# Patient Record
Sex: FEMALE | Race: BLACK OR AFRICAN AMERICAN | Employment: FULL TIME | URBAN - METROPOLITAN AREA
[De-identification: names, ages, dates, MRNs, and addresses within clinical notes are randomized per-mention and may not be internally consistent; named-entity substitution may affect disease eponyms.]

---

## 2017-01-06 ENCOUNTER — CLINICAL SUPPORT (OUTPATIENT)
Dept: FAMILY MEDICINE CLINIC | Age: 47
End: 2017-01-06

## 2017-01-06 VITALS
HEART RATE: 65 BPM | BODY MASS INDEX: 34.46 KG/M2 | HEIGHT: 66 IN | RESPIRATION RATE: 18 BRPM | SYSTOLIC BLOOD PRESSURE: 142 MMHG | DIASTOLIC BLOOD PRESSURE: 105 MMHG | WEIGHT: 214.4 LBS

## 2017-01-06 DIAGNOSIS — E66.9 OBESITY (BMI 30-39.9): Primary | ICD-10-CM

## 2017-01-06 NOTE — PROGRESS NOTES
Reviewed Joe herman. Will plan to have her complete labs at our lab or at [facility] or [facility].

## 2017-01-06 NOTE — PROGRESS NOTES
Progress Note: Weekly Education Class in the [facility] Weight Loss Program   Is there anything that you or the patient needs to let the [facility] Physician know about? Yes, due to the elevated blood pressure today during Triage the patient had informed me that she had a bad experience with the Phlebotomist during her most recent blood draw at the Weight Loss Lab [facility]. The patient showed me pictures of her Hematoma's on both of her arms and explained that the  stuck her arms 3 times in order to draw the 4 vials of blood. During the Butterfly Needle used to draw the blood, the Phlebotomist repeatedly stuck her and once the needle was inside the arm she proceeded to dig around to find a vein. The patient had to tell the Phlebotomist to stop on two of the sticks, due to it hurting too much to wait for the vein to be found. The patient would like to have someone else draw her blood in the future and is seeking out help in this matter. This is the same  that has been taking Blood Draws from this patient since she has been on our program and the episodes of Blood Draw are becoming too painful for her to go back, she states that she does not look forward month to month when it is time to get her blood drawn. The patient then showed me today her arms and both arms still show Hematoma's where the Blood Draw was taken and the sites are still painful for her today to touch.    Over the past week, have you experienced any side-effects? no    Mandi Alvarado is a 52 y.o. female who is enrolled in [facility]    Visit Vitals    BP (!) 142/105 (BP 1 Location: Left arm, BP Patient Position: Sitting)  Comment: very upset/stressed at the moment of triage    Pulse 65    Resp 18    Ht 5' 6\" (1.676 m)    Wt 214 lb 6.4 oz (97.3 kg)    LMP 12/01/2016    BMI 34.61 kg/m2     Weight Metrics 1/6/2017 12/30/2016 12/20/2016 12/16/2016 12/9/2016 12/9/2016 11/18/2016   Weight 214 lb 6.4 oz 214 lb 210 lb 8 oz 231 lb 6.4 oz - 213 lb 206 lb 6.4 oz   Waist Measure Inches 33 34 - 33.8 34 - 32   BMI 34.61 kg/m2 34.54 kg/m2 33.98 kg/m2 37.35 kg/m2 - 34.38 kg/m2 33.31 kg/m2         Have you received any other medical care this week? no  If yes, where and for what? Have you had any change in your medications since your last visit? no  If yes what? Did you have any problems adhering to the program last week? no  If yes, please explain:       Eating Habits Over Last Week:  Did you take in 64 oz of non-caloric fluids? yes     Did you consume your 4 meal replacements each day?  yes       Physical Activity Over the Past Week:    Aerobic exercise: 0 min  Resistance exercise: 0 workouts / week

## 2017-01-11 NOTE — PROGRESS NOTES
Nurse note from patient's weekly VLCD / LCD / Maintenance class was reviewed. Pertinent medical concerns were:  None noted. Vitals 1/6/2017 12/30/2016 12/20/2016 12/16/2016 12/9/2016   Weight 214 lb 6.4 oz 214 lb 210 lb 8 oz 231 lb 6.4 oz 213 lb     Vitals 11/18/2016 11/7/2016 10/28/2016   Weight 206 lb 6.4 oz 202 lb 201 lb 6.4 oz     Pt complaints noted and will discuss at next office meeting  Continue current regimen.

## 2017-01-13 ENCOUNTER — CLINICAL SUPPORT (OUTPATIENT)
Dept: FAMILY MEDICINE CLINIC | Age: 47
End: 2017-01-13

## 2017-01-13 VITALS
DIASTOLIC BLOOD PRESSURE: 88 MMHG | SYSTOLIC BLOOD PRESSURE: 135 MMHG | HEART RATE: 63 BPM | WEIGHT: 215.2 LBS | BODY MASS INDEX: 34.73 KG/M2

## 2017-01-13 DIAGNOSIS — E66.9 OBESITY (BMI 30-39.9): Primary | ICD-10-CM

## 2017-01-18 ENCOUNTER — OFFICE VISIT (OUTPATIENT)
Dept: INTERNAL MEDICINE CLINIC | Age: 47
End: 2017-01-18

## 2017-01-18 VITALS
HEIGHT: 66 IN | TEMPERATURE: 98.7 F | HEART RATE: 61 BPM | OXYGEN SATURATION: 97 % | WEIGHT: 214 LBS | SYSTOLIC BLOOD PRESSURE: 135 MMHG | BODY MASS INDEX: 34.39 KG/M2 | RESPIRATION RATE: 14 BRPM | DIASTOLIC BLOOD PRESSURE: 85 MMHG

## 2017-01-18 DIAGNOSIS — E66.9 OBESITY (BMI 30-39.9): Primary | ICD-10-CM

## 2017-01-18 DIAGNOSIS — I10 ESSENTIAL HYPERTENSION WITH GOAL BLOOD PRESSURE LESS THAN 140/90: ICD-10-CM

## 2017-01-18 DIAGNOSIS — E55.9 VITAMIN D DEFICIENCY: ICD-10-CM

## 2017-01-18 RX ORDER — LOSARTAN POTASSIUM 25 MG/1
25 TABLET ORAL DAILY
COMMUNITY
End: 2018-07-03 | Stop reason: SDUPTHER

## 2017-01-18 RX ORDER — ERGOCALCIFEROL 1.25 MG/1
50000 CAPSULE ORAL
COMMUNITY
End: 2017-03-22 | Stop reason: DRUGHIGH

## 2017-01-18 RX ORDER — METRONIDAZOLE 500 MG/1
TABLET ORAL
Refills: 2 | COMMUNITY
Start: 2016-12-14 | End: 2017-01-18 | Stop reason: ALTCHOICE

## 2017-01-18 NOTE — PROGRESS NOTES
[facility] Progress Note:   F/up Physician Visit    CC Obesity      Radha Lynn is a 52 y.o. female who is here for her  f/up physician visit for the VLCD Program. Abby Jason has completed 20 weeks of the program to date. She has gained 4 lbs in the last month. She admits that she has not really started the VLCD yet. She was eating off the program for the last few weeks due to her birthday, and the holidays, and her best friends birthday. She states she did start back again with 4 meal replacements a day 2 days ago. Hypertension: BP has been elevated recently. She has met with her PCP Dr Maria Luisa Liriano about 2 weeks ago. Lisinopril was changed to losartan due to cough. BP improved today. Will be having labs completed at our office from now on.      Starting weight: 206 lbs  Current weight: 214 lbs  Lowest weight: 191 lbs  Goal weight: 150 lbs     Most recent EK2016 at 206 lbs    Weight Metrics 2016   Weight - 214 lb 215 lb 3.2 oz 214 lb 6.4 oz 214 lb 210 lb 8 oz 231 lb 6.4 oz   Waist Measure Inches 34 - - 33 34 - 33.8   Exercise Mins/week 30 - - - - - -   Body Fat % 39.3 - - - - - -   BMI - 34.54 kg/m2 34.73 kg/m2 34.61 kg/m2 34.54 kg/m2 33.98 kg/m2 37.35 kg/m2         Current Outpatient Prescriptions   Medication Sig Dispense Refill    losartan (COZAAR) 25 mg tablet Take 25 mg by mouth daily.  doxycycline hyclate 50 mg TbEC Take 50 mg by mouth two (2) times a day.  hydrochlorothiazide (HYDRODIURIL) 25 mg tablet Take 25 mg by mouth daily. Indications: HYPERTENSION  2    iron-vitamin C 65 mg iron- 125 mg TbEC Take  mg by mouth daily.  biotin 2,500 mcg Tab Take 2,500 mcg by mouth daily.  MULTIVITAMIN WITH MINERALS (ONE DAILY COMPLETE PO) Take  by mouth daily.  dapsone 7.5 % glwp 7.5 % by Apply Externally route daily.  Apply to the Face           Participation   Did you attend clinic and class last week? yes    Review of Systems  Since your last visit, have you experienced any complications? no  If yes, please list:     No CP, SOB, palpitations, lightheadedness, dizziness, constipation. Positives highlight in BOLD. Are you taking an appetite suppressant? no  If so, is there any Chest Pain, Palpitations or Dizziness? BP Readings from Last 10 Encounters:   01/18/17 135/85   01/13/17 135/88   01/06/17 (!) 142/105   12/30/16 (!) 137/91   12/20/16 (!) 140/98   12/16/16 (!) 144/93   12/09/16 (!) 141/101   11/18/16 (!) 148/95   11/07/16 134/90   10/28/16 (!) 132/92         Have you received any other medical care this week? no  If yes, where and for what? Have you discontinued or changed any medicine or dose of your medicine since your last visit with Dr Sinai Sotomayor? yes  If yes, where and for what? PCP Dr Lian Juárez changed the patients medication to Losartan Potassium 25 mg once a day to replace Lisinopril medication, as well as Vitamin D 37586CD once a week for the next 8 weeks        Diet  How many ounces of calorie-free liquids did you consume each day? 64 oz    How many meal replacements did you take each day? 4    Did you have any problems adhering to the program?  yes If yes, please explain:       Exercise  Aerobic exercise: 30 min  Resistance exercise: 0 workouts / week  Any discomfort?  no     If yes, where? Review of Systems  Complete ROS negative except where noted above    Objective  Visit Vitals    /85 (BP 1 Location: Left arm, BP Patient Position: Sitting)    Pulse 61    Temp 98.7 °F (37.1 °C) (Oral)    Resp 14    Ht 5' 6\" (1.676 m)    Wt 214 lb (97.1 kg)    LMP 01/01/2016    SpO2 97%    BMI 34.54 kg/m2     Patient's last menstrual period was 01/01/2016.     Waist Circumference: I personally reviewed patient's Weight Management Doc Flowsheet  Neck Circumference: I personally reviewed patient's Weight Management Doc Flowsheet  Percent Body Fat: I personally reviewed patient's Weight Management Doc Flowsheet    Physical Exam  Appearance: Well appearing, obese, A&O, NAD  HEENT:  NC/AT, PERRL, No scleral icterus  Heart:  RRR without M/R/G  Lungs:  CTAB, no rhonchi, rales, or wheezes with good air exchange   Ext:  No LE Edema    Assessment / Plan    Encounter Diagnoses   Name Primary?  Obesity (BMI 30-39. 9) Yes    Essential hypertension with goal blood pressure less than 140/90     Vitamin D deficiency        1. Weight management poorly controlled, needs improvement, patient poorly compliant   Progress was reviewed with patient    2. Labs    Latest results reviewed with patient   Lab slip given to pt for f/up HDL labs    3. HTN: improved today, continues to follow with PCP for this. 4. Vit D: now on weekly ergocalciferol for this. Patient Instructions       Follow up with your PCP for elevated BP. Goals      Weight loss goal for 4 weeks: 10 Lbs. You can do it!!! Body Mass Index: Care Instructions  Your Care Instructions    Body mass index (BMI) can help you see if your weight is raising your risk for health problems. It uses a formula to compare how much you weigh with how tall you are. A BMI between 18.5 and 24.9 is considered healthy. A BMI between 25 and 29.9 is considered overweight. A BMI of 30 or higher is considered obese. If your BMI is in the normal range, it means that you have a lower risk for weight-related health problems. If your BMI is in the overweight or obese range, you may be at increased risk for weight-related health problems, such as high blood pressure, heart disease, stroke, arthritis or joint pain, and diabetes. BMI is just one measure of your risk for weight-related health problems. You may be at higher risk for health problems if you are not active, you eat an unhealthy diet, or you drink too much alcohol or use tobacco products. Follow-up care is a key part of your treatment and safety.  Be sure to make and go to all appointments, and call your doctor if you are having problems. It's also a good idea to know your test results and keep a list of the medicines you take. How can you care for yourself at home? · Practice healthy eating habits. This includes eating plenty of fruits, vegetables, whole grains, lean protein, and low-fat dairy. · Get at least 30 minutes of exercise 5 days a week or more. Brisk walking is a good choice. You also may want to do other activities, such as running, swimming, cycling, or playing tennis or team sports. · Do not smoke. Smoking can increase your risk for health problems. If you need help quitting, talk to your doctor about stop-smoking programs and medicines. These can increase your chances of quitting for good. · Limit alcohol to 2 drinks a day for men and 1 drink a day for women. Too much alcohol can cause health problems. If you have a BMI higher than 25  · Your doctor may do other tests to check your risk for weight-related health problems. This may include measuring the distance around your waist. A waist measurement of more than 40 inches in men or 35 inches in women can increase the risk of weight-related health problems. · Talk with your doctor about steps you can take to stay healthy or improve your health. You may need to make lifestyle changes to lose weight and stay healthy, such as changing your diet and getting regular exercise. Where can you learn more? Go to http://kathy-shamar.info/. Enter S176 in the search box to learn more about \"Body Mass Index: Care Instructions. \"  Current as of: February 16, 2016  Content Version: 11.1  © 2221-8750 Healthwise, Incorporated. Care instructions adapted under license by [employer] (which disclaims liability or warranty for this information).  If you have questions about a medical condition or this instruction, always ask your healthcare professional. [address] any warranty or liability for your use of this information. Follow-up Disposition:  Return in about 4 weeks (around 2/15/2017), or if symptoms worsen or fail to improve. 10 minutes of the 15 minutes face to face time with Antonette Niño consisted of counseling & coordinating and/or discussing treatment plans in reference to her obesity. The primary encounter diagnosis was Obesity (BMI 30-39.9). Diagnoses of Essential hypertension with goal blood pressure less than 140/90 and Vitamin D deficiency were also pertinent to this visit. The patient is to follow up as scheduled and will report to the ED or the office if symptoms change or increase. The patient has voiced understanding and will comply.

## 2017-01-18 NOTE — PATIENT INSTRUCTIONS
Start ergocalciferol once weekly for low vit d level. Follow up with your PCP for elevated BP. Goals      Weight loss goal for 4 weeks:     Lbs. You can do it!!! Body Mass Index: Care Instructions  Your Care Instructions    Body mass index (BMI) can help you see if your weight is raising your risk for health problems. It uses a formula to compare how much you weigh with how tall you are. A BMI between 18.5 and 24.9 is considered healthy. A BMI between 25 and 29.9 is considered overweight. A BMI of 30 or higher is considered obese. If your BMI is in the normal range, it means that you have a lower risk for weight-related health problems. If your BMI is in the overweight or obese range, you may be at increased risk for weight-related health problems, such as high blood pressure, heart disease, stroke, arthritis or joint pain, and diabetes. BMI is just one measure of your risk for weight-related health problems. You may be at higher risk for health problems if you are not active, you eat an unhealthy diet, or you drink too much alcohol or use tobacco products. Follow-up care is a key part of your treatment and safety. Be sure to make and go to all appointments, and call your doctor if you are having problems. It's also a good idea to know your test results and keep a list of the medicines you take. How can you care for yourself at home? · Practice healthy eating habits. This includes eating plenty of fruits, vegetables, whole grains, lean protein, and low-fat dairy. · Get at least 30 minutes of exercise 5 days a week or more. Brisk walking is a good choice. You also may want to do other activities, such as running, swimming, cycling, or playing tennis or team sports. · Do not smoke. Smoking can increase your risk for health problems. If you need help quitting, talk to your doctor about stop-smoking programs and medicines. These can increase your chances of quitting for good.   · Limit alcohol to 2 drinks a day for men and 1 drink a day for women. Too much alcohol can cause health problems. If you have a BMI higher than 25  · Your doctor may do other tests to check your risk for weight-related health problems. This may include measuring the distance around your waist. A waist measurement of more than 40 inches in men or 35 inches in women can increase the risk of weight-related health problems. · Talk with your doctor about steps you can take to stay healthy or improve your health. You may need to make lifestyle changes to lose weight and stay healthy, such as changing your diet and getting regular exercise. Where can you learn more? Go to http://kathy-shamar.info/. Enter S176 in the search box to learn more about \"Body Mass Index: Care Instructions. \"  Current as of: February 16, 2016  Content Version: 11.1  © 7405-9889 [employer]. Care instructions adapted under license by [employer] (which disclaims liability or warranty for this information). If you have questions about a medical condition or this instruction, always ask your healthcare professional. [address] any warranty or liability for your use of this information.

## 2017-01-18 NOTE — MR AVS SNAPSHOT
Visit Information Date & Time Provider Department Dept. Phone Encounter #  
 1/18/2017 10:00 AM Jah Peters NP Internist of [address] [place] [address] Place [id number] Follow-up Instructions Return in about 4 weeks (around 2/15/2017), or if symptoms worsen or fail to improve. Your Appointments 1/20/2017  5:40 AM  
METABOLIC PROGRAM 5 with HRMP WEEKLY CLASS TRICITIES  
HR Metabolic Program ([address] [place] [address]) Appt Note: [facility] Weekly Weigh-in & BP check [place] [facility] [address] [facility]  
  
    
 1/27/2017  7:53 AM  
METABOLIC PROGRAM 5 with HRMP WEEKLY CLASS TRICITIES  
HR Metabolic Program ([address] [place] [address]) Appt Note: [facility] Weekly Weigh-in & BP check [place] [address] [facility]  
[phone]  
  
    
 2/3/2017  6:12 AM  
METABOLIC PROGRAM 5 with HRMP WEEKLY CLASS TRICITIES  
HR Metabolic Program ([address] [place] [address]) Appt Note: weekly class [place] [address] [facility]  
[phone]  
  
    
 2/10/2017  6:49 AM  
METABOLIC PROGRAM 5 with HRMP WEEKLY CLASS TRICITIES  
HR Metabolic Program ([facility]) Appt Note: weekly class [place] [address] [facility]  
[phone] 2/17/2017  2:25 AM  
METABOLIC PROGRAM 5 with HRMP WEEKLY CLASS TRICITIES  
HR Metabolic Program ([address] [place] [address]) Appt Note: weekly class [place] [address] [facility] [address]  
[phone]  
  
    
 2/22/2017 28:01 AM  
METABOLIC PROGRAM 15 with Jah Peters NP Internist of [facility] ([address] [place] [address]) Appt Note: 1 mth f/u  
 [address] [facility], [address] [place]  
  
   
 [address] [facility], [address]  
  
    
 2/24/2017  0:81 AM  
METABOLIC PROGRAM 5 with HRMP WEEKLY CLASS TRICITIES  
HR Metabolic Program ([address] [place] [address]) Appt Note: weekly class [place] [address] [facility]  
[phone] Upcoming Health Maintenance Date Due DTaP/Tdap/Td series (1 - Tdap) 1/5/1991 INFLUENZA AGE 9 TO ADULT 8/1/2016 PAP AKA CERVICAL CYTOLOGY 12/6/2019 Allergies as of 1/18/2017  Review Complete On: 1/18/2017 By: Bia Hernandez NP Severity Noted Reaction Type Reactions Percocet [Oxycodone-acetaminophen]  04/07/2015    Itching Current Immunizations  Never Reviewed No immunizations on file. Not reviewed this visit You Were Diagnosed With   
  
 Codes Comments Obesity (BMI 30-39.9)    -  Primary ICD-10-CM: Q02.7 ICD-9-CM: 278.00 Essential hypertension with goal blood pressure less than 140/90     ICD-10-CM: I10 
ICD-9-CM: 401.9 Vitamin D deficiency     ICD-10-CM: E55.9 ICD-9-CM: 268.9 Vitals BP Pulse Temp Resp Height(growth percentile) Weight(growth percentile) 135/85 (BP 1 Location: Left arm, BP Patient Position: Sitting) 61 98.7 °F (37.1 °C) (Oral) 14 5' 6\" (1.676 m) 214 lb (97.1 kg) LMP SpO2 BMI OB Status Smoking Status 01/01/2016 97% 34.54 kg/m2 Having regular periods Former Smoker Vitals History BMI and BSA Data Body Mass Index Body Surface Area 34.54 kg/m 2 2.13 m 2 Your Updated Medication List  
  
   
This list is accurate as of: 1/18/17 10:45 AM.  Always use your most recent med list.  
  
  
  
  
 biotin 2,500 mcg Tab Take 2,500 mcg by mouth daily. dapsone 7.5 % Glwp  
7.5 % by Apply Externally route daily. Apply to the Face  
  
 doxycycline hyclate 50 mg Tbec Take 50 mg by mouth two (2) times a day. hydroCHLOROthiazide 25 mg tablet Commonly known as:  HYDRODIURIL Take 25 mg by mouth daily. Indications: HYPERTENSION  
  
 iron-vitamin C 65 mg iron- 125 mg Tbec Take  mg by mouth daily. losartan 25 mg tablet Commonly known as:  COZAAR Take 25 mg by mouth daily.   
  
 ONE DAILY COMPLETE PO  
 Take  by mouth daily. VITAMIN D2 50,000 unit capsule Generic drug:  ergocalciferol Take 50,000 Units by mouth every seven (7) days. Follow-up Instructions Return in about 4 weeks (around 2/15/2017), or if symptoms worsen or fail to improve. To-Do List   
 02/18/2017 Lab:  HEMOGLOBIN A1C WITH EAG   
  
 02/18/2017 Lab:  INSULIN   
  
 02/18/2017 Lab:  METABOLIC PANEL, COMPREHENSIVE   
  
 02/18/2017 Lab:  TSH 3RD GENERATION   
  
 02/18/2017 Lab:  URIC ACID   
  
 02/18/2017 Lab:  URINALYSIS W/ RFLX MICROSCOPIC   
  
 02/18/2017 Lab:  VITAMIN D, 25 HYDROXY Patient Instructions Start ergocalciferol once weekly for low vit d level. Follow up with your PCP for elevated BP. Goals Weight loss goal for 4 weeks:     Lbs. You can do it!!! Body Mass Index: Care Instructions Your Care Instructions Body mass index (BMI) can help you see if your weight is raising your risk for health problems. It uses a formula to compare how much you weigh with how tall you are. A BMI between 18.5 and 24.9 is considered healthy. A BMI between 25 and 29.9 is considered overweight. A BMI of 30 or higher is considered obese. If your BMI is in the normal range, it means that you have a lower risk for weight-related health problems. If your BMI is in the overweight or obese range, you may be at increased risk for weight-related health problems, such as high blood pressure, heart disease, stroke, arthritis or joint pain, and diabetes. BMI is just one measure of your risk for weight-related health problems. You may be at higher risk for health problems if you are not active, you eat an unhealthy diet, or you drink too much alcohol or use tobacco products. Follow-up care is a key part of your treatment and safety.  Be sure to make and go to all appointments, and call your doctor if you are having problems. It's also a good idea to know your test results and keep a list of the medicines you take. How can you care for yourself at home? · Practice healthy eating habits. This includes eating plenty of fruits, vegetables, whole grains, lean protein, and low-fat dairy. · Get at least 30 minutes of exercise 5 days a week or more. Brisk walking is a good choice. You also may want to do other activities, such as running, swimming, cycling, or playing tennis or team sports. · Do not smoke. Smoking can increase your risk for health problems. If you need help quitting, talk to your doctor about stop-smoking programs and medicines. These can increase your chances of quitting for good. · Limit alcohol to 2 drinks a day for men and 1 drink a day for women. Too much alcohol can cause health problems. If you have a BMI higher than 25 · Your doctor may do other tests to check your risk for weight-related health problems. This may include measuring the distance around your waist. A waist measurement of more than 40 inches in men or 35 inches in women can increase the risk of weight-related health problems. · Talk with your doctor about steps you can take to stay healthy or improve your health. You may need to make lifestyle changes to lose weight and stay healthy, such as changing your diet and getting regular exercise. Where can you learn more? Go to http://kathy-shamar.info/. Enter S176 in the search box to learn more about \"Body Mass Index: Care Instructions. \" Current as of: February 16, 2016 Content Version: 11.1 © 4472-7336 [employer], Incorporated. Care instructions adapted under license by [employer] (which disclaims liability or warranty for this information). If you have questions about a medical condition or this instruction, always ask your healthcare professional. [address] any warranty or liability for your use of this information. Please provide this summary of care documentation to your next provider. Your primary care clinician is listed as Jessy Payne. If you have any questions after today's visit, please call [phone].

## 2017-01-18 NOTE — PROGRESS NOTES
Chief Complaint   Patient presents with    Weight Management     1. Have you been to the ER, urgent care clinic since your last visit? Hospitalized since your last visit? No    2. Have you seen or consulted any other health care providers outside of the [facility] since your last visit? Include any pap smears or colon screening.  No

## 2017-01-27 ENCOUNTER — CLINICAL SUPPORT (OUTPATIENT)
Dept: FAMILY MEDICINE CLINIC | Age: 47
End: 2017-01-27

## 2017-01-27 VITALS
WEIGHT: 204.2 LBS | DIASTOLIC BLOOD PRESSURE: 85 MMHG | SYSTOLIC BLOOD PRESSURE: 127 MMHG | HEIGHT: 66 IN | BODY MASS INDEX: 32.82 KG/M2 | HEART RATE: 76 BPM

## 2017-01-27 DIAGNOSIS — E66.9 OBESITY (BMI 30-39.9): Primary | ICD-10-CM

## 2017-01-27 NOTE — PROGRESS NOTES
Progress Note: Weekly Education Class in the [facility] Weight Loss Program   Is there anything that you or the patient needs to let the [address] [facility] Physician know about? no  Over the past week, have you experienced any side-effects? no    Stormy Koroma is a 52 y.o. female who is enrolled in [facility] Loss Program    Visit Vitals    /85 (BP 1 Location: Left arm, BP Patient Position: Sitting)    Pulse 76    Ht 5' 6\" (1.676 m)    Wt 204 lb 3.2 oz (92.6 kg)    LMP 01/01/2016    BMI 32.96 kg/m2     Weight Metrics 1/27/2017 1/18/2017 1/18/2017 1/13/2017 1/6/2017 12/30/2016 12/20/2016   Weight 204 lb 3.2 oz - 214 lb 215 lb 3.2 oz 214 lb 6.4 oz 214 lb 210 lb 8 oz   Waist Measure Inches 33 34 - - 33 34 -   Exercise Mins/week - 30 - - - - -   Body Fat % - 39.3 - - - - -   BMI 32.96 kg/m2 - 34.54 kg/m2 34.73 kg/m2 34.61 kg/m2 34.54 kg/m2 33.98 kg/m2         Have you received any other medical care this week? no  If yes, where and for what? Have you had any change in your medications since your last visit? no  If yes what? Did you have any problems adhering to the program last week? no  If yes, please explain:       Eating Habits Over Last Week:  Did you take in 64 oz of non-caloric fluids?  yes     Did you consume your 4 meal replacements each day? no on two days only got in 3 meal replacements      Physical Activity Over the Past Week:    Aerobic exercise: 120 min  Resistance exercise: 7 hours and 30 minute workouts / week

## 2017-01-30 NOTE — PROGRESS NOTES
Nurse note from patient's weekly VLCD / LCD / Maintenance class was reviewed. Pertinent medical concerns were:  None noted. Vitals 1/27/2017 1/18/2017 1/18/2017 1/13/2017 1/6/2017   Weight 204 lb 3.2 oz  214 lb 215 lb 3.2 oz 214 lb 6.4 oz     Vitals 12/30/2016 12/20/2016 12/16/2016   Weight 214 lb 210 lb 8 oz 231 lb 6.4 oz     Continue current regimen.

## 2017-02-03 ENCOUNTER — CLINICAL SUPPORT (OUTPATIENT)
Dept: FAMILY MEDICINE CLINIC | Age: 47
End: 2017-02-03

## 2017-02-03 VITALS
SYSTOLIC BLOOD PRESSURE: 119 MMHG | BODY MASS INDEX: 32.3 KG/M2 | HEIGHT: 66 IN | DIASTOLIC BLOOD PRESSURE: 82 MMHG | HEART RATE: 72 BPM | WEIGHT: 201 LBS

## 2017-02-03 DIAGNOSIS — E66.9 OBESITY (BMI 30-39.9): Primary | ICD-10-CM

## 2017-02-03 NOTE — PROGRESS NOTES
Progress Note: Weekly Education Class in the [facility] Loss Program   Is there anything that you or the patient needs to let the [address] [facility] Physician know about? no  Over the past week, have you experienced any side-effects? no    Chrissy Perez is a 52 y.o. female who is enrolled in [facility] Loss Program    Visit Vitals    /82 (BP 1 Location: Left arm, BP Patient Position: Sitting)    Pulse 72    Ht 5' 6\" (1.676 m)    Wt 201 lb (91.2 kg)    LMP 01/01/2016    BMI 32.44 kg/m2     Weight Metrics 2/3/2017 1/27/2017 1/18/2017 1/18/2017 1/13/2017 1/6/2017 12/30/2016   Weight 201 lb 204 lb 3.2 oz - 214 lb 215 lb 3.2 oz 214 lb 6.4 oz 214 lb   Waist Measure Inches 33 33 34 - - 33 34   Exercise Mins/week - - 30 - - - -   Body Fat % - - 39.3 - - - -   BMI 32.44 kg/m2 32.96 kg/m2 - 34.54 kg/m2 34.73 kg/m2 34.61 kg/m2 34.54 kg/m2         Have you received any other medical care this week? no  If yes, where and for what? Have you had any change in your medications since your last visit? no  If yes what? Did you have any problems adhering to the program last week? no  If yes, please explain:       Eating Habits Over Last Week:  Did you take in 64 oz of non-caloric fluids? yes     Did you consume your 4 meal replacements each day?  yes       Physical Activity Over the Past Week:    Aerobic exercise: 120 min  Resistance exercise: 150 min workouts / week

## 2017-02-05 NOTE — PROGRESS NOTES
Nurse note from patient's weekly VLCD / LCD / Maintenance class was reviewed. Pertinent medical concerns were:  None noted. Vitals 2/3/2017 1/27/2017 1/18/2017 1/18/2017 1/13/2017   Weight 201 lb 204 lb 3.2 oz  214 lb 215 lb 3.2 oz     Vitals 1/6/2017 12/30/2016 12/20/2016 12/16/2016   Weight 214 lb 6.4 oz 214 lb 210 lb 8 oz 231 lb 6.4 oz     Continue current regimen.

## 2017-02-10 ENCOUNTER — CLINICAL SUPPORT (OUTPATIENT)
Dept: FAMILY MEDICINE CLINIC | Age: 47
End: 2017-02-10

## 2017-02-10 VITALS
DIASTOLIC BLOOD PRESSURE: 85 MMHG | SYSTOLIC BLOOD PRESSURE: 139 MMHG | BODY MASS INDEX: 32.08 KG/M2 | HEIGHT: 66 IN | WEIGHT: 199.6 LBS | HEART RATE: 67 BPM

## 2017-02-10 DIAGNOSIS — E66.9 OBESITY (BMI 30-39.9): Primary | ICD-10-CM

## 2017-02-10 NOTE — PROGRESS NOTES
Progress Note: Weekly Education Class in the [facility] Weight Loss Program   Is there anything that you or the patient needs to let the [address] [facility] Physician know about? no  Over the past week, have you experienced any side-effects? no    Tess Woodson is a 52 y.o. female who is enrolled in [facility] Loss Program    Visit Vitals    /85 (BP 1 Location: Right arm, BP Patient Position: Sitting)    Pulse 67    Ht 5' 6\" (1.676 m)    Wt 199 lb 9.6 oz (90.5 kg)    LMP 01/01/2016    BMI 32.22 kg/m2     Weight Metrics 2/10/2017 2/3/2017 1/27/2017 1/18/2017 1/18/2017 1/13/2017 1/6/2017   Weight 199 lb 9.6 oz 201 lb 204 lb 3.2 oz - 214 lb 215 lb 3.2 oz 214 lb 6.4 oz   Waist Measure Inches 32 33 33 34 - - 33   Exercise Mins/week - - - 30 - - -   Body Fat % - - - 39.3 - - -   BMI 32.22 kg/m2 32.44 kg/m2 32.96 kg/m2 - 34.54 kg/m2 34.73 kg/m2 34.61 kg/m2         Have you received any other medical care this week? no  If yes, where and for what? Have you had any change in your medications since your last visit? no  If yes what? Did you have any problems adhering to the program last week? no  If yes, please explain:       Eating Habits Over Last Week:  Did you take in 64 oz of non-caloric fluids? No patient is getting between 32-81 oz per day    Did you consume your 4 meal replacements each day?  No just missed 1 meal replacement this week       Physical Activity Over the Past Week:    Aerobic exercise: 90 min  Resistance exercise: 4 hours and 30 min workouts / week

## 2017-02-14 NOTE — PROGRESS NOTES
Nurse note from patient's weekly VLCD / LCD / Maintenance class was reviewed. Pertinent medical concerns were:  None noted. Vitals 2/10/2017 2/3/2017 1/27/2017 1/18/2017 1/18/2017   Weight 199 lb 9.6 oz 201 lb 204 lb 3.2 oz  214 lb     Vitals 1/13/2017 1/6/2017 12/30/2016 12/20/2016   Weight 215 lb 3.2 oz 214 lb 6.4 oz 214 lb 210 lb 8 oz     Continue current regimen.

## 2017-02-16 ENCOUNTER — HOSPITAL ENCOUNTER (OUTPATIENT)
Dept: LAB | Age: 47
Discharge: HOME | End: 2017-02-16
Payer: COMMERCIAL

## 2017-02-16 LAB
ALBUMIN SERPL BCP-MCNC: 4.1 G/DL (ref 3.4–5)
ALBUMIN/GLOB SERPL: 1 {RATIO} (ref 0.8–1.7)
ALP SERPL-CCNC: 67 U/L (ref 45–117)
ALT SERPL-CCNC: 43 U/L (ref 13–56)
ANION GAP BLD CALC-SCNC: 10 MMOL/L (ref 3–18)
APPEARANCE UR: CLEAR
AST SERPL W P-5'-P-CCNC: 25 U/L (ref 15–37)
BACTERIA URNS QL MICRO: NEGATIVE /HPF
BILIRUB SERPL-MCNC: 0.5 MG/DL (ref 0.2–1)
BILIRUB UR QL: NEGATIVE
BUN SERPL-MCNC: 18 MG/DL (ref 7–18)
BUN/CREAT SERPL: 17 (ref 12–20)
CALCIUM SERPL-MCNC: 9.5 MG/DL (ref 8.5–10.1)
CHLORIDE SERPL-SCNC: 99 MMOL/L (ref 100–108)
CO2 SERPL-SCNC: 31 MMOL/L (ref 21–32)
COLOR UR: YELLOW
CREAT SERPL-MCNC: 1.09 MG/DL (ref 0.6–1.3)
EPITH CASTS URNS QL MICRO: NORMAL /LPF (ref 0–5)
EST. AVERAGE GLUCOSE BLD GHB EST-MCNC: 103 MG/DL
GLOBULIN SER CALC-MCNC: 4 G/DL (ref 2–4)
GLUCOSE SERPL-MCNC: 77 MG/DL (ref 74–99)
GLUCOSE UR STRIP.AUTO-MCNC: NEGATIVE MG/DL
HBA1C MFR BLD: 5.2 % (ref 4.2–5.6)
HGB UR QL STRIP: NEGATIVE
KETONES UR QL STRIP.AUTO: ABNORMAL MG/DL
LEUKOCYTE ESTERASE UR QL STRIP.AUTO: ABNORMAL
NITRITE UR QL STRIP.AUTO: NEGATIVE
PH UR STRIP: 8 [PH] (ref 5–8)
POTASSIUM SERPL-SCNC: 3.7 MMOL/L (ref 3.5–5.5)
PROT SERPL-MCNC: 8.1 G/DL (ref 6.4–8.2)
PROT UR STRIP-MCNC: NEGATIVE MG/DL
RBC #/AREA URNS HPF: NORMAL /HPF (ref 0–5)
SODIUM SERPL-SCNC: 140 MMOL/L (ref 136–145)
SP GR UR REFRACTOMETRY: 1.02 (ref 1–1.03)
TSH SERPL DL<=0.05 MIU/L-ACNC: 0.74 UIU/ML (ref 0.36–3.74)
URATE SERPL-MCNC: 2.8 MG/DL (ref 2.6–7.2)
UROBILINOGEN UR QL STRIP.AUTO: 0.2 EU/DL (ref 0.2–1)
WBC URNS QL MICRO: NORMAL /HPF (ref 0–4)

## 2017-02-16 PROCEDURE — 80053 COMPREHEN METABOLIC PANEL: CPT | Performed by: NURSE PRACTITIONER

## 2017-02-16 PROCEDURE — 84443 ASSAY THYROID STIM HORMONE: CPT | Performed by: NURSE PRACTITIONER

## 2017-02-16 PROCEDURE — 83036 HEMOGLOBIN GLYCOSYLATED A1C: CPT | Performed by: NURSE PRACTITIONER

## 2017-02-16 PROCEDURE — 84550 ASSAY OF BLOOD/URIC ACID: CPT | Performed by: NURSE PRACTITIONER

## 2017-02-16 PROCEDURE — 36415 COLL VENOUS BLD VENIPUNCTURE: CPT | Performed by: NURSE PRACTITIONER

## 2017-02-16 PROCEDURE — 83525 ASSAY OF INSULIN: CPT | Performed by: NURSE PRACTITIONER

## 2017-02-16 PROCEDURE — 82306 VITAMIN D 25 HYDROXY: CPT | Performed by: NURSE PRACTITIONER

## 2017-02-16 PROCEDURE — 81001 URINALYSIS AUTO W/SCOPE: CPT | Performed by: NURSE PRACTITIONER

## 2017-02-17 ENCOUNTER — CLINICAL SUPPORT (OUTPATIENT)
Dept: FAMILY MEDICINE CLINIC | Age: 47
End: 2017-02-17

## 2017-02-17 VITALS
SYSTOLIC BLOOD PRESSURE: 119 MMHG | HEART RATE: 73 BPM | WEIGHT: 194.2 LBS | BODY MASS INDEX: 31.21 KG/M2 | HEIGHT: 66 IN | DIASTOLIC BLOOD PRESSURE: 83 MMHG

## 2017-02-17 DIAGNOSIS — E66.9 OBESITY (BMI 30-39.9): Primary | ICD-10-CM

## 2017-02-17 LAB
25(OH)D3 SERPL-MCNC: 59.9 NG/ML (ref 30–100)
INSULIN SERPL-ACNC: 6.3 UIU/ML (ref 2.6–24.9)

## 2017-02-17 NOTE — PROGRESS NOTES
Progress Note: Weekly Education Class in the [facility] Loss Program   Is there anything that you or the patient needs to let the [address] [facility] Phys[facility] know about? no  Over the past week, have you experienced any side-effects? no    Erum Riddle is a 52 y.o. female who is enrolled in [facility] Loss Program    Visit Vitals    /83 (BP 1 Location: Right arm, BP Patient Position: Sitting)    Pulse 73    Ht 5' 6\" (1.676 m)    Wt 194 lb 3.2 oz (88.1 kg)    BMI 31.34 kg/m2     Weight Metrics 2/17/2017 2/10/2017 2/3/2017 1/27/2017 1/18/2017 1/18/2017 1/13/2017   Weight 194 lb 3.2 oz 199 lb 9.6 oz 201 lb 204 lb 3.2 oz - 214 lb 215 lb 3.2 oz   Waist Measure Inches 30 32 33 33 34 - -   Exercise Mins/week - - - - 30 - -   Body Fat % - - - - 39.3 - -   BMI 31.34 kg/m2 32.22 kg/m2 32.44 kg/m2 32.96 kg/m2 - 34.54 kg/m2 34.73 kg/m2         Have you received any other medical care this week? no  If yes, where and for what? Have you had any change in your medications since your last visit? no  If yes what? Did you have any problems adhering to the program last week? no  If yes, please explain:       Eating Habits Over Last Week:  Did you take in 64 oz of non-caloric fluids?  yes     Did you consume your 4 meal replacements each day? no missed 2 meal replacements on Saturday, and missed 1 meal replacement on Thursday       Physical Activity Over the Past Week:    Aerobic exercise: 0 min  Resistance exercise: 0 workouts / week

## 2017-02-18 DIAGNOSIS — E66.9 OBESITY (BMI 30-39.9): ICD-10-CM

## 2017-02-18 DIAGNOSIS — E55.9 VITAMIN D DEFICIENCY: ICD-10-CM

## 2017-02-18 DIAGNOSIS — I10 ESSENTIAL HYPERTENSION WITH GOAL BLOOD PRESSURE LESS THAN 140/90: ICD-10-CM

## 2017-02-21 NOTE — PROGRESS NOTES
Nurse note from patient's weekly VLCD / LCD / Maintenance class was reviewed. Pertinent medical concerns were:  None noted. Vitals 2/17/2017 2/10/2017 2/3/2017 1/27/2017 1/18/2017   Weight 194 lb 3.2 oz 199 lb 9.6 oz 201 lb 204 lb 3.2 oz      Vitals 1/18/2017 1/13/2017   Weight 214 lb 215 lb 3.2 oz     Continue current regimen.

## 2017-02-22 ENCOUNTER — OFFICE VISIT (OUTPATIENT)
Dept: INTERNAL MEDICINE CLINIC | Age: 47
End: 2017-02-22

## 2017-02-22 VITALS
HEART RATE: 79 BPM | OXYGEN SATURATION: 96 % | DIASTOLIC BLOOD PRESSURE: 84 MMHG | BODY MASS INDEX: 30.81 KG/M2 | SYSTOLIC BLOOD PRESSURE: 109 MMHG | HEIGHT: 66 IN | TEMPERATURE: 98.3 F | RESPIRATION RATE: 16 BRPM | WEIGHT: 191.7 LBS

## 2017-02-22 DIAGNOSIS — I10 ESSENTIAL HYPERTENSION WITH GOAL BLOOD PRESSURE LESS THAN 140/90: ICD-10-CM

## 2017-02-22 DIAGNOSIS — E66.9 OBESITY (BMI 30-39.9): Primary | ICD-10-CM

## 2017-02-22 NOTE — MR AVS SNAPSHOT
Visit Information Date & Time Provider Department Dept. Phone Encounter #  
 2/22/2017 10:15 AM Dorothy Rizzo NP Internist of [address] [place] [address] [phone] Follow-up Instructions Return in about 4 weeks (around 3/22/2017). Your Appointments 3/3/2017  8:27 AM  
METABOLIC PROGRAM 5 with HRMP WEEKLY CLASS TRICITIES  
HR Metabolic Program ([address] [place] [address]) Appt Note: weekly class HR Metabolic Program ([address] [place] [address]) [phone]  
  
    
 3/10/2017  1:80 AM  
METABOLIC PROGRAM 5 with HRMP WEEKLY CLASS TRICITIES  
HR Metabolic Program ([address] [place] [address]) Appt Note: weekly class HR Metabolic Program ([address] [place] [address]) [phone] 3/17/2017  5:73 AM  
METABOLIC PROGRAM 5 with HRMP WEEKLY CLASS TRICITIES  
HR Metabolic Program ([address] [place] [address]) Appt Note: weekly class HR Metabolic Program ([address] [place] [address]) [phone] 3/17/2017  9:50 AM  
LAB with [facility] NURSE VISIT Internist of [facility] ([address] [place] [address]) Appt Note: labs [employer] [address], [address] [facility] [address] [place]  
  
   
 [address], [address]  
  
    
 3/22/2017 83:12 AM  
METABOLIC PROGRAM 15 with Dorothy Rizzo NP Internist of [facility] ([address] [place] [address]) Appt Note: metabolic program [employer] [address], [address] [place] [address] [place]  
  
   
 [address] [facility], [address]  
  
    
 3/24/2017  1:52 AM  
METABOLIC PROGRAM 5 with HRMP WEEKLY CLASS TRICITIES  
HR Metabolic Program ([address] [place] [address]) Appt Note: weekly class HR Metabolic Program ([address] [place] [address]) [phone] Upcoming Health Maintenance Date Due DTaP/Tdap/Td series (1 - Tdap) 1/5/1991 INFLUENZA AGE 9 TO ADULT 8/1/2016 PAP AKA CERVICAL CYTOLOGY 12/6/2019 Allergies as of 2/22/2017  Review Complete On: 2/22/2017 By: Dorothy Rizzo NP  
  
 Severity Noted Reaction Type Reactions Percocet [Oxycodone-acetaminophen]  04/07/2015    Itching Current Immunizations  Never Reviewed No immunizations on file. Not reviewed this visit You Were Diagnosed With   
  
 Codes Comments Obesity (BMI 30-39.9)    -  Primary ICD-10-CM: I91.5 ICD-9-CM: 278.00 Essential hypertension with goal blood pressure less than 140/90     ICD-10-CM: I10 
ICD-9-CM: 401.9 Vitals BP  
  
  
  
  
  
 109/84 (BP 1 Location: Right arm, BP Patient Position: Sitting) BMI and BSA Data Body Mass Index Body Surface Area 30.94 kg/m 2 2.01 m 2 Your Updated Medication List  
  
   
This list is accurate as of: 2/22/17 10:54 AM.  Always use your most recent med list.  
  
  
  
  
 biotin 2,500 mcg Tab Take 2,500 mcg by mouth daily. dapsone 7.5 % Glwp  
7.5 % by Apply Externally route daily. Apply to the Face  
  
 doxycycline hyclate 50 mg Tbec Take 50 mg by mouth two (2) times a day. hydroCHLOROthiazide 25 mg tablet Commonly known as:  HYDRODIURIL Take 25 mg by mouth daily. Indications: HYPERTENSION  
  
 iron,carbonyl-vitamin C 65 mg iron- 125 mg Tbec Take  mg by mouth daily. losartan 25 mg tablet Commonly known as:  COZAAR Take 25 mg by mouth daily. ONE DAILY COMPLETE PO Take  by mouth daily. VITAMIN D2 50,000 unit capsule Generic drug:  ergocalciferol Take 50,000 Units by mouth every seven (7) days. Follow-up Instructions Return in about 4 weeks (around 3/22/2017). To-Do List   
 02/22/2017 Lab:  CBC W/O DIFF   
  
 02/22/2017 Lab:  MAGNESIUM   
  
 02/22/2017 Lab:  METABOLIC PANEL, COMPREHENSIVE   
  
 02/22/2017 Lab:  TSH 3RD GENERATION   
  
 02/22/2017 Lab:  URINALYSIS W/ RFLX MICROSCOPIC   
  
 02/22/2017 Lab:  VITAMIN D, 25 HYDROXY Patient Instructions Health Maintenance Due Topic Date Due  
  DTaP/Tdap/Td series (1 - Tdap) 01/05/1991  
 INFLUENZA AGE 9 TO ADULT  08/01/2016 Goals - DECREASE HYDROCHLOROTHIAZIDE 25MG TO 12MG DAILY. WILL LIKELY STOP THIS IN THE NEAR FUTURE. CONTINUE LOSARTAN 25 MG DAILY. - limit work outs to 30-45 minutes Weight loss goal for 4 weeks:  10   Lbs. You can do it!!! Body Mass Index: Care Instructions Your Care Instructions Body mass index (BMI) can help you see if your weight is raising your risk for health problems. It uses a formula to compare how much you weigh with how tall you are. A BMI between 18.5 and 24.9 is considered healthy. A BMI between 25 and 29.9 is considered overweight. A BMI of 30 or higher is considered obese. If your BMI is in the normal range, it means that you have a lower risk for weight-related health problems. If your BMI is in the overweight or obese range, you may be at increased risk for weight-related health problems, such as high blood pressure, heart disease, stroke, arthritis or joint pain, and diabetes. BMI is just one measure of your risk for weight-related health problems. You may be at higher risk for health problems if you are not active, you eat an unhealthy diet, or you drink too much alcohol or use tobacco products. Follow-up care is a key part of your treatment and safety. Be sure to make and go to all appointments, and call your doctor if you are having problems. It's also a good idea to know your test results and keep a list of the medicines you take. How can you care for yourself at home? · Practice healthy eating habits. This includes eating plenty of fruits, vegetables, whole grains, lean protein, and low-fat dairy. · Get at least 30 minutes of exercise 5 days a week or more. Brisk walking is a good choice. You also may want to do other activities, such as running, swimming, cycling, or playing tennis or team sports. · Do not smoke. Smoking can increase your risk for health problems.  If you need help quitting, talk to your doctor about stop-smoking programs and medicines. These can increase your chances of quitting for good. · Limit alcohol to 2 drinks a day for men and 1 drink a day for women. Too much alcohol can cause health problems. If you have a BMI higher than 25 · Your doctor may do other tests to check your risk for weight-related health problems. This may include measuring the distance around your waist. A waist measurement of more than 40 inches in men or 35 inches in women can increase the risk of weight-related health problems. · Talk with your doctor about steps you can take to stay healthy or improve your health. You may need to make lifestyle changes to lose weight and stay healthy, such as changing your diet and getting regular exercise. Where can you learn more? Go to http://kathy-shamar.info/. Enter S176 in the search box to learn more about \"Body Mass Index: Care Instructions. \" Current as of: February 16, 2016 Content Version: 11.1 © 4582-1013 [employer]. Care instructions adapted under license by [employer] (which disclaims liability or warranty for this information). If you have questions about a medical condition or this instruction, always ask your healthcare professional. [address] any warranty or liability for your use of this information. Please provide this summary of care documentation to your next provider. Your primary care clinician is listed as Gerhardt Johann. If you have any questions after today's visit, please call [phone].

## 2017-02-22 NOTE — PROGRESS NOTES
[facility] Progress Note:   F/up Physician Visit    CC: Obesity      Amrit Dill is a 52 y.o. female who is here for her  f/up physician visit for the VLCD Program. Lula Jo has completed 37 weeks of the program to date. She has lost 14 lbs since our last visit! She has gotten back on track, now following VLCD. She is exercising 3 times a week, treadmill walking/jog for 30 minutes. HTN: improved. continues on losartan and HCTZ for BP. She did have an episode of lightheaded dizziness yesterday. BP on the lower end of normal today 109/84. Starting weight: 206 lbs  Current weight: 191 lbs  Lowest weight: 191 lbs  Goal weight: 150 lbs    Most recent EK2016 at 206 lbs    Weight Metrics 2017 2017 2017 2/10/2017 2/3/2017 2017 2017   Weight - 191 lb 11.2 oz 194 lb 3.2 oz 199 lb 9.6 oz 201 lb 204 lb 3.2 oz -   Waist Measure Inches 31 - 30 32 33 33 34   Exercise Mins/week 90 - - - - - 30   Body Fat % 37.7 - - - - - 39.3   BMI - 30.94 kg/m2 31.34 kg/m2 32.22 kg/m2 32.44 kg/m2 32.96 kg/m2 -         Current Outpatient Prescriptions   Medication Sig Dispense Refill    losartan (COZAAR) 25 mg tablet Take 25 mg by mouth daily.  ergocalciferol (VITAMIN D2) 50,000 unit capsule Take 50,000 Units by mouth every seven (7) days.  doxycycline hyclate 50 mg TbEC Take 50 mg by mouth two (2) times a day.  dapsone 7.5 % glwp 7.5 % by Apply Externally route daily. Apply to the Face      hydrochlorothiazide (HYDRODIURIL) 25 mg tablet Take 25 mg by mouth daily. Indications: HYPERTENSION  2    iron-vitamin C 65 mg iron- 125 mg TbEC Take  mg by mouth daily.  biotin 2,500 mcg Tab Take 2,500 mcg by mouth daily.  MULTIVITAMIN WITH MINERALS (ONE DAILY COMPLETE PO) Take  by mouth daily. Participation   Did you attend clinic and class last week?  yes    Review of Systems  Since your last visit, have you experienced any complications? no  If yes, please list:     No CP, SOB, palpitations, lightheadedness, dizziness- with standing. One episode when driving yesterday, constipation. Positives highlight in BOLD. Are you taking an appetite suppressant? no  If so, is there any Chest Pain, Palpitations or Dizziness? BP Readings from Last 10 Encounters:   02/22/17 109/84   02/17/17 119/83   02/10/17 139/85   02/03/17 119/82   01/27/17 127/85   01/18/17 135/85   01/13/17 135/88   01/06/17 (!) 142/105   12/30/16 (!) 137/91   12/20/16 (!) 140/98         Have you received any other medical care this week? no  If yes, where and for what? Have you discontinued or changed any medicine or dose of your medicine since your last visit with Dr Sharyle Cheng? no  If yes, where and for what? Diet  How many ounces of calorie-free liquids did you consume each day? 64 oz plus    How many meal replacements did you take each day? 4    Did you have any problems adhering to the program?  no If yes, please explain:       Exercise  Aerobic exercise: 90 min  Resistance exercise: 90 workouts / week  Any discomfort?  no     If yes, where? Review of Systems  Complete ROS negative except where noted above    Objective  Visit Vitals    /84 (BP 1 Location: Right arm, BP Patient Position: Sitting)    Pulse 79    Temp 98.3 °F (36.8 °C) (Oral)    Resp 16    Ht 5' 6\" (1.676 m)    Wt 191 lb 11.2 oz (87 kg)    LMP 01/31/2017    SpO2 96%    BMI 30.94 kg/m2     Patient's last menstrual period was 01/31/2017.     Waist Circumference: I personally reviewed patient's Weight Management Doc Flowsheet  Neck Circumference: I personally reviewed patient's Weight Management Doc Flowsheet  Percent Body Fat: I personally reviewed patient's Weight Management Doc Flowsheet    Physical Exam  Appearance: well appearing, A&O, NAD  HEENT:  NC/AT, PERRL, No scleral icterus  Heart:  RRR without M/R/G  Lungs:  CTAB, no rhonchi, rales, or wheezes with good air exchange   Ext:  No LE Edema    Assessment / Plan    Encounter Diagnoses   Name Primary?  Obesity (BMI 30-39. 9) Yes    Essential hypertension with goal blood pressure less than 140/90        1. Weight management improved   Progress was reviewed with patient    2. Labs    Latest results reviewed with patient   Lab slip given to pt for f/up HDL labs    3. HTN: due to some ortho static dizziness, lower end of normal BP today, will cut HCTZ in half 12.5mg daily. Continue losartan 25mg daily. Will continue to monitor BP at weekly check in. Patient Instructions     Health Maintenance Due   Topic Date Due    DTaP/Tdap/Td series (1 - Tdap) 01/05/1991    INFLUENZA AGE 9 TO ADULT  08/01/2016     Goals  - Decrease HYDROCHLOROTHIAZIDE to 12. 5MG DAILY. CONTINUE LOSARTAN 25 MG DAILY. Weight loss goal for 4 weeks:  10   Lbs. You can do it!!! Body Mass Index: Care Instructions  Your Care Instructions    Body mass index (BMI) can help you see if your weight is raising your risk for health problems. It uses a formula to compare how much you weigh with how tall you are. A BMI between 18.5 and 24.9 is considered healthy. A BMI between 25 and 29.9 is considered overweight. A BMI of 30 or higher is considered obese. If your BMI is in the normal range, it means that you have a lower risk for weight-related health problems. If your BMI is in the overweight or obese range, you may be at increased risk for weight-related health problems, such as high blood pressure, heart disease, stroke, arthritis or joint pain, and diabetes. BMI is just one measure of your risk for weight-related health problems. You may be at higher risk for health problems if you are not active, you eat an unhealthy diet, or you drink too much alcohol or use tobacco products. Follow-up care is a key part of your treatment and safety. Be sure to make and go to all appointments, and call your doctor if you are having problems.  It's also a good idea to know your test results and keep a list of the medicines you take. How can you care for yourself at home? · Practice healthy eating habits. This includes eating plenty of fruits, vegetables, whole grains, lean protein, and low-fat dairy. · Get at least 30 minutes of exercise 5 days a week or more. Brisk walking is a good choice. You also may want to do other activities, such as running, swimming, cycling, or playing tennis or team sports. · Do not smoke. Smoking can increase your risk for health problems. If you need help quitting, talk to your doctor about stop-smoking programs and medicines. These can increase your chances of quitting for good. · Limit alcohol to 2 drinks a day for men and 1 drink a day for women. Too much alcohol can cause health problems. If you have a BMI higher than 25  · Your doctor may do other tests to check your risk for weight-related health problems. This may include measuring the distance around your waist. A waist measurement of more than 40 inches in men or 35 inches in women can increase the risk of weight-related health problems. · Talk with your doctor about steps you can take to stay healthy or improve your health. You may need to make lifestyle changes to lose weight and stay healthy, such as changing your diet and getting regular exercise. Where can you learn more? Go to http://kathy-shamar.info/. Enter S176 in the search box to learn more about \"Body Mass Index: Care Instructions. \"  Current as of: February 16, 2016  Content Version: 11.1  © 2120-7453 [employer]. Care instructions adapted under license by [employer] (which disclaims liability or warranty for this information). If you have questions about a medical condition or this instruction, always ask your healthcare professional. [place] [address] any warranty or liability for your use of this information.           Follow-up Disposition:  Return in about 4 weeks (around 3/22/2017). 10 minutes of the 15 minutes face to face time with Cristhian Ledezma consisted of counseling & coordinating and/or discussing treatment plans in reference to her obesity. The primary encounter diagnosis was Obesity (BMI 30-39.9). A diagnosis of Essential hypertension with goal blood pressure less than 140/90 was also pertinent to this visit. The patient is to follow up as scheduled and will report to the ED or the office if symptoms change or increase. The patient has voiced understanding and will comply.

## 2017-02-22 NOTE — PROGRESS NOTES
Chief Complaint   Patient presents with    Weight Management     1. Have you been to the ER, urgent care clinic since your last visit? Hospitalized since your last visit? No    2. Have you seen or consulted any other health care providers outside of the [address] [place] [address] since your last visit? Include any pap smears or colon screening.  No

## 2017-02-22 NOTE — PATIENT INSTRUCTIONS
Health Maintenance Due   Topic Date Due    DTaP/Tdap/Td series (1 - Tdap) 01/05/1991    INFLUENZA AGE 9 TO ADULT  08/01/2016     Goals  - DECREASE HYDROCHLOROTHIAZIDE 25MG TO 12MG DAILY. WILL LIKELY STOP THIS IN THE NEAR FUTURE. CONTINUE LOSARTAN 25 MG DAILY. - limit work outs to 30-45 minutes     Weight loss goal for 4 weeks:  10   Lbs. You can do it!!! Body Mass Index: Care Instructions  Your Care Instructions    Body mass index (BMI) can help you see if your weight is raising your risk for health problems. It uses a formula to compare how much you weigh with how tall you are. A BMI between 18.5 and 24.9 is considered healthy. A BMI between 25 and 29.9 is considered overweight. A BMI of 30 or higher is considered obese. If your BMI is in the normal range, it means that you have a lower risk for weight-related health problems. If your BMI is in the overweight or obese range, you may be at increased risk for weight-related health problems, such as high blood pressure, heart disease, stroke, arthritis or joint pain, and diabetes. BMI is just one measure of your risk for weight-related health problems. You may be at higher risk for health problems if you are not active, you eat an unhealthy diet, or you drink too much alcohol or use tobacco products. Follow-up care is a key part of your treatment and safety. Be sure to make and go to all appointments, and call your doctor if you are having problems. It's also a good idea to know your test results and keep a list of the medicines you take. How can you care for yourself at home? · Practice healthy eating habits. This includes eating plenty of fruits, vegetables, whole grains, lean protein, and low-fat dairy. · Get at least 30 minutes of exercise 5 days a week or more. Brisk walking is a good choice. You also may want to do other activities, such as running, swimming, cycling, or playing tennis or team sports. · Do not smoke.  Smoking can increase your risk for health problems. If you need help quitting, talk to your doctor about stop-smoking programs and medicines. These can increase your chances of quitting for good. · Limit alcohol to 2 drinks a day for men and 1 drink a day for women. Too much alcohol can cause health problems. If you have a BMI higher than 25  · Your doctor may do other tests to check your risk for weight-related health problems. This may include measuring the distance around your waist. A waist measurement of more than 40 inches in men or 35 inches in women can increase the risk of weight-related health problems. · Talk with your doctor about steps you can take to stay healthy or improve your health. You may need to make lifestyle changes to lose weight and stay healthy, such as changing your diet and getting regular exercise. Where can you learn more? Go to http://kathy-shamar.info/. Enter S176 in the search box to learn more about \"Body Mass Index: Care Instructions. \"  Current as of: February 16, 2016  Content Version: 11.1  © 5946-9489 [employer]. Care instructions adapted under license by [employer] (which disclaims liability or warranty for this information). If you have questions about a medical condition or this instruction, always ask your healthcare professional. [address] any warranty or liability for your use of this information.

## 2017-03-03 ENCOUNTER — CLINICAL SUPPORT (OUTPATIENT)
Dept: FAMILY MEDICINE CLINIC | Age: 47
End: 2017-03-03

## 2017-03-03 VITALS
SYSTOLIC BLOOD PRESSURE: 127 MMHG | WEIGHT: 188.8 LBS | DIASTOLIC BLOOD PRESSURE: 88 MMHG | HEIGHT: 66 IN | BODY MASS INDEX: 30.34 KG/M2 | HEART RATE: 82 BPM

## 2017-03-03 DIAGNOSIS — E66.9 OBESITY (BMI 30-39.9): Primary | ICD-10-CM

## 2017-03-03 NOTE — PROGRESS NOTES
Progress Note: Weekly Education Class in the [facility]   Is there anything that you or the patient needs to let the [address] [facility] Physician know about? no  Over the past week, have you experienced any side-effects? no    Leslye Quintana is a 52 y.o. female who is enrolled in [facility]    Visit Vitals    /88 (BP 1 Location: Left arm, BP Patient Position: Sitting)    Pulse 82    Ht 5' 6\" (1.676 m)    Wt 188 lb 12.8 oz (85.6 kg)    LMP 01/31/2017    BMI 30.47 kg/m2     Weight Metrics 3/3/2017 2/22/2017 2/22/2017 2/17/2017 2/10/2017 2/3/2017 1/27/2017   Weight 188 lb 12.8 oz - 191 lb 11.2 oz 194 lb 3.2 oz 199 lb 9.6 oz 201 lb 204 lb 3.2 oz   Waist Measure Inches 30 31 - 30 32 33 33   Exercise Mins/week - 90 - - - - -   Body Fat % - 37.7 - - - - -   BMI 30.47 kg/m2 - 30.94 kg/m2 31.34 kg/m2 32.22 kg/m2 32.44 kg/m2 32.96 kg/m2         Have you received any other medical care this week? no  If yes, where and for what? Have you had any change in your medications since your last visit? no  If yes what? Did you have any problems adhering to the program last week? no  If yes, please explain:       Eating Habits Over Last Week:  Did you take in 64 oz of non-caloric fluids? yes     Did you consume your 4 meal replacements each day?  yes       Physical Activity Over the Past Week:    Aerobic exercise: 60 min  Resistance exercise: 60 workouts / week

## 2017-03-10 ENCOUNTER — CLINICAL SUPPORT (OUTPATIENT)
Dept: FAMILY MEDICINE CLINIC | Age: 47
End: 2017-03-10

## 2017-03-10 VITALS
SYSTOLIC BLOOD PRESSURE: 121 MMHG | HEIGHT: 66 IN | HEART RATE: 67 BPM | WEIGHT: 188.2 LBS | DIASTOLIC BLOOD PRESSURE: 82 MMHG | BODY MASS INDEX: 30.25 KG/M2

## 2017-03-10 DIAGNOSIS — E66.9 OBESITY (BMI 30-39.9): Primary | ICD-10-CM

## 2017-03-10 NOTE — PROGRESS NOTES
Progress Note: Weekly Education Class in the [facility]   Is there anything that you or the patient needs to let the [address] [facility] Physician know about? no  Over the past week, have you experienced any side-effects? no    Elsa Jovel is a 52 y.o. female who is enrolled in [facility]    Visit Vitals    /82 (BP 1 Location: Right arm, BP Patient Position: Sitting)    Pulse 67    Ht 5' 6\" (1.676 m)    Wt 188 lb 3.2 oz (85.4 kg)    LMP 01/31/2017    BMI 30.38 kg/m2     Weight Metrics 3/10/2017 3/3/2017 2/22/2017 2/22/2017 2/17/2017 2/10/2017 2/3/2017   Weight 188 lb 3.2 oz 188 lb 12.8 oz - 191 lb 11.2 oz 194 lb 3.2 oz 199 lb 9.6 oz 201 lb   Waist Measure Inches 30 30 31 - 30 32 33   Exercise Mins/week - - 90 - - - -   Body Fat % - - 37.7 - - - -   BMI 30.38 kg/m2 30.47 kg/m2 - 30.94 kg/m2 31.34 kg/m2 32.22 kg/m2 32.44 kg/m2         Have you received any other medical care this week? no  If yes, where and for what? Have you had any change in your medications since your last visit? yes  If yes what? No longer taking Vitamin D 09028 internation units and now taking over the counter medication Vitamin D3 2000 Units once a day    Did you have any problems adhering to the program last week? no  If yes, please explain:       Eating Habits Over Last Week:  Did you take in 64 oz of non-caloric fluids? yes     Did you consume your 4 meal replacements each day?  yes       Physical Activity Over the Past Week:    Aerobic exercise: 190 min  Resistance exercise: 420 min workouts / week

## 2017-03-17 ENCOUNTER — CLINICAL SUPPORT (OUTPATIENT)
Dept: FAMILY MEDICINE CLINIC | Age: 47
End: 2017-03-17

## 2017-03-17 ENCOUNTER — HOSPITAL ENCOUNTER (OUTPATIENT)
Dept: LAB | Age: 47
Discharge: HOME | End: 2017-03-17
Payer: COMMERCIAL

## 2017-03-17 VITALS
HEART RATE: 82 BPM | DIASTOLIC BLOOD PRESSURE: 84 MMHG | HEIGHT: 66 IN | BODY MASS INDEX: 29.92 KG/M2 | WEIGHT: 186.2 LBS | SYSTOLIC BLOOD PRESSURE: 118 MMHG

## 2017-03-17 DIAGNOSIS — E66.9 OBESITY (BMI 30-39.9): Primary | ICD-10-CM

## 2017-03-17 DIAGNOSIS — I10 ESSENTIAL HYPERTENSION WITH GOAL BLOOD PRESSURE LESS THAN 140/90: ICD-10-CM

## 2017-03-17 DIAGNOSIS — E66.9 OBESITY (BMI 30-39.9): ICD-10-CM

## 2017-03-17 LAB
25(OH)D3 SERPL-MCNC: 55.8 NG/ML (ref 30–100)
ALBUMIN SERPL BCP-MCNC: 3.9 G/DL (ref 3.4–5)
ALBUMIN/GLOB SERPL: 1 {RATIO} (ref 0.8–1.7)
ALP SERPL-CCNC: 64 U/L (ref 45–117)
ALT SERPL-CCNC: 36 U/L (ref 13–56)
ANION GAP BLD CALC-SCNC: 8 MMOL/L (ref 3–18)
APPEARANCE UR: CLEAR
AST SERPL W P-5'-P-CCNC: 25 U/L (ref 15–37)
BILIRUB SERPL-MCNC: 0.5 MG/DL (ref 0.2–1)
BILIRUB UR QL: NEGATIVE
BUN SERPL-MCNC: 14 MG/DL (ref 7–18)
BUN/CREAT SERPL: 15 (ref 12–20)
CALCIUM SERPL-MCNC: 9.3 MG/DL (ref 8.5–10.1)
CHLORIDE SERPL-SCNC: 102 MMOL/L (ref 100–108)
CO2 SERPL-SCNC: 29 MMOL/L (ref 21–32)
COLOR UR: YELLOW
CREAT SERPL-MCNC: 0.92 MG/DL (ref 0.6–1.3)
ERYTHROCYTE [DISTWIDTH] IN BLOOD BY AUTOMATED COUNT: 15.1 % (ref 11.6–14.5)
GLOBULIN SER CALC-MCNC: 4 G/DL (ref 2–4)
GLUCOSE SERPL-MCNC: 73 MG/DL (ref 74–99)
GLUCOSE UR STRIP.AUTO-MCNC: NEGATIVE MG/DL
HCT VFR BLD AUTO: 40.2 % (ref 35–45)
HGB BLD-MCNC: 12.9 G/DL (ref 12–16)
HGB UR QL STRIP: NEGATIVE
KETONES UR QL STRIP.AUTO: NEGATIVE MG/DL
LEUKOCYTE ESTERASE UR QL STRIP.AUTO: NEGATIVE
MAGNESIUM SERPL-MCNC: 2.2 MG/DL (ref 1.8–2.4)
MCH RBC QN AUTO: 25.9 PG (ref 24–34)
MCHC RBC AUTO-ENTMCNC: 32.1 G/DL (ref 31–37)
MCV RBC AUTO: 80.6 FL (ref 74–97)
NITRITE UR QL STRIP.AUTO: NEGATIVE
PH UR STRIP: 6.5 [PH] (ref 5–8)
PLATELET # BLD AUTO: 317 K/UL (ref 135–420)
PMV BLD AUTO: 10.4 FL (ref 9.2–11.8)
POTASSIUM SERPL-SCNC: 3.8 MMOL/L (ref 3.5–5.5)
PROT SERPL-MCNC: 7.9 G/DL (ref 6.4–8.2)
PROT UR STRIP-MCNC: NEGATIVE MG/DL
RBC # BLD AUTO: 4.99 M/UL (ref 4.2–5.3)
SODIUM SERPL-SCNC: 139 MMOL/L (ref 136–145)
SP GR UR REFRACTOMETRY: 1.02 (ref 1–1.03)
TSH SERPL DL<=0.05 MIU/L-ACNC: 0.5 UIU/ML (ref 0.36–3.74)
UROBILINOGEN UR QL STRIP.AUTO: 0.2 EU/DL (ref 0.2–1)
WBC # BLD AUTO: 4.2 K/UL (ref 4.6–13.2)

## 2017-03-17 PROCEDURE — 36415 COLL VENOUS BLD VENIPUNCTURE: CPT | Performed by: NURSE PRACTITIONER

## 2017-03-17 PROCEDURE — 84443 ASSAY THYROID STIM HORMONE: CPT | Performed by: NURSE PRACTITIONER

## 2017-03-17 PROCEDURE — 81003 URINALYSIS AUTO W/O SCOPE: CPT | Performed by: NURSE PRACTITIONER

## 2017-03-17 PROCEDURE — 85027 COMPLETE CBC AUTOMATED: CPT | Performed by: NURSE PRACTITIONER

## 2017-03-17 PROCEDURE — 80053 COMPREHEN METABOLIC PANEL: CPT | Performed by: NURSE PRACTITIONER

## 2017-03-17 PROCEDURE — 83735 ASSAY OF MAGNESIUM: CPT | Performed by: NURSE PRACTITIONER

## 2017-03-17 PROCEDURE — 82306 VITAMIN D 25 HYDROXY: CPT | Performed by: NURSE PRACTITIONER

## 2017-03-17 RX ORDER — GARLIC 1000 MG
1000 CAPSULE ORAL DAILY
COMMUNITY
End: 2018-07-03 | Stop reason: ALTCHOICE

## 2017-03-17 NOTE — PROGRESS NOTES
Progress Note: Weekly Education Class in the [facility]   Is there anything that you or the patient needs to let the [facility] Physician know about? no  Over the past week, have you experienced any side-effects? no    Natalee Weinberg is a 52 y.o. female who is enrolled in [facility]    Visit Vitals    /84 (BP 1 Location: Right arm, BP Patient Position: Sitting)    Pulse 82    Ht 5' 6\" (1.676 m)    Wt 186 lb 3.2 oz (84.5 kg)    LMP 01/31/2017    BMI 30.05 kg/m2     Weight Metrics 3/17/2017 3/17/2017 3/10/2017 3/3/2017 2/22/2017 2/22/2017 2/17/2017   Weight - 186 lb 3.2 oz 188 lb 3.2 oz 188 lb 12.8 oz - 191 lb 11.2 oz 194 lb 3.2 oz   Waist Measure Inches 29 - 30 30 31 - 30   Exercise Mins/week - - - - 90 - -   Body Fat % - - - - 37.7 - -   BMI - 30.05 kg/m2 30.38 kg/m2 30.47 kg/m2 - 30.94 kg/m2 31.34 kg/m2         Have you received any other medical care this week? no  If yes, where and for what? Have you had any change in your medications since your last visit? yes  If yes what? Started Over The Counter Medication Garlic 6614 mg once a day  Did you have any problems adhering to the program last week? no  If yes, please explain:       Eating Habits Over Last Week:  Did you take in 64 oz of non-caloric fluids? yes     Did you consume your 4 meal replacements each day?  yes       Physical Activity Over the Past Week:    Aerobic exercise: 80 min  Resistance exercise: 60 min workouts / week

## 2017-03-22 ENCOUNTER — OFFICE VISIT (OUTPATIENT)
Dept: INTERNAL MEDICINE CLINIC | Age: 47
End: 2017-03-22

## 2017-03-22 VITALS
TEMPERATURE: 97.3 F | WEIGHT: 186.5 LBS | HEART RATE: 65 BPM | OXYGEN SATURATION: 97 % | DIASTOLIC BLOOD PRESSURE: 92 MMHG | BODY MASS INDEX: 29.97 KG/M2 | RESPIRATION RATE: 12 BRPM | SYSTOLIC BLOOD PRESSURE: 120 MMHG | HEIGHT: 66 IN

## 2017-03-22 DIAGNOSIS — I10 ESSENTIAL HYPERTENSION WITH GOAL BLOOD PRESSURE LESS THAN 140/90: ICD-10-CM

## 2017-03-22 DIAGNOSIS — E66.9 OBESITY (BMI 30-39.9): Primary | ICD-10-CM

## 2017-03-22 RX ORDER — CHOLECALCIFEROL (VITAMIN D3) 125 MCG
2000 CAPSULE ORAL DAILY
COMMUNITY
End: 2022-10-17 | Stop reason: CLARIF

## 2017-03-22 RX ORDER — METRONIDAZOLE 500 MG/1
TABLET ORAL
Refills: 2 | COMMUNITY
Start: 2017-03-16 | End: 2017-03-22 | Stop reason: ALTCHOICE

## 2017-03-22 RX ORDER — PHENOL 1.4 %
300 AEROSOL, SPRAY (ML) MUCOUS MEMBRANE DAILY
COMMUNITY
End: 2017-10-19 | Stop reason: ALTCHOICE

## 2017-03-22 RX ORDER — NALTREXONE HYDROCHLORIDE AND BUPROPION HYDROCHLORIDE 8; 90 MG/1; MG/1
TABLET, EXTENDED RELEASE ORAL
Refills: 3 | COMMUNITY
Start: 2017-02-24 | End: 2017-03-22 | Stop reason: ALTCHOICE

## 2017-03-22 NOTE — PROGRESS NOTES
[facility] Progress Note:   F/up Physician Visit    CC: Obesity      Nataliia Friedman is a 52 y.o. female who is here for her  f/up physician visit for the VLCD Program. Peggy Zeng has completed 41 weeks of the program to date. She has lost 5 lbs since our last visit.      She is exercising 3 times a week, treadmill walking/jog for 30 minutes. She has also started resistance training.      HTN: improved. continues on losartan and HCTZ for BP, HCTZ dose was not cut in half as discussed, she forgot to do this. BP has been stable at weekly checks, slightly elevated today. She would prefer to discuss med changes with Dr Cat Model her PCP at this month apt.      Starting weight: 206 lbs  Current weight: 186 lbs  Lowest weight: 191 lbs  Goal weight: 150 lbs     Most recent [id number] at 206 lbs    Weight Metrics 3/22/2017 3/22/2017 3/17/2017 3/17/2017 3/10/2017 3/3/2017 2017   Weight - 186 lb 8 oz - 186 lb 3.2 oz 188 lb 3.2 oz 188 lb 12.8 oz -   Waist Measure Inches 29 - 29 - 30 30 31   Exercise Mins/week 420 - - - - - 90   Body Fat % 36.8 - - - - - 37.7   BMI - 30.1 kg/m2 - 30.05 kg/m2 30.38 kg/m2 30.47 kg/m2 -         Current Outpatient Prescriptions   Medication Sig Dispense Refill    cholecalciferol, vitamin D3, (VITAMIN D3) 2,000 unit tab Take 2,000 Units by mouth daily.  dietary supplement cap Take 1,000 mg by mouth daily. CLA Over The Counter Medication taken two tablets in AM and one tablet PM      krill-om-3-dha-epa-phospho-ast (MEGARED OMEGA-3 KRILL OIL) 224-07-83-50 mg cap Take 300 mg by mouth daily.  garlic 9,847 mg cap Take 1,000 mg by mouth daily.  losartan (COZAAR) 25 mg tablet Take 25 mg by mouth daily.  doxycycline hyclate 50 mg TbEC Take 50 mg by mouth two (2) times a day.  dapsone 7.5 % glwp 7.5 % by Apply Externally route daily. Apply to the Face      hydrochlorothiazide (HYDRODIURIL) 25 mg tablet Take 25 mg by mouth daily.  Indications: HYPERTENSION  2  iron-vitamin C 65 mg iron- 125 mg TbEC Take  mg by mouth daily.  biotin 2,500 mcg Tab Take 2,500 mcg by mouth daily.  MULTIVITAMIN WITH MINERALS (ONE DAILY COMPLETE PO) Take  by mouth daily. Participation   Did you attend clinic and class last week? yes    Review of Systems  Since your last visit, have you experienced any complications? no  If yes, please list:     No CP, SOB, palpitations, lightheadedness, dizziness, constipation. Positives highlight in BOLD. Are you taking an appetite suppressant? no  If so, is there any Chest Pain, Palpitations or Dizziness? BP Readings from Last 10 Encounters:   03/22/17 (!) 120/92   03/17/17 118/84   03/10/17 121/82   03/03/17 127/88   02/22/17 109/84   02/17/17 119/83   02/10/17 139/85   02/03/17 119/82   01/27/17 127/85   01/18/17 135/85         Have you received any other medical care this week? no  If yes, where and for what? Have you discontinued or changed any medicine or dose of your medicine since your last visit? no  If yes, where and for what? Diet  How many ounces of calorie-free liquids did you consume each day? 64 oz plus  How many meal replacements did you take each day? 4    Did you have any problems adhering to the program?  no If yes, please explain:       Exercise  Aerobic exercise: 420 min  Resistance exercise: 120 min workouts / week  Any discomfort?  no     If yes, where? Review of Systems  Complete ROS negative except where noted above    Objective  Visit Vitals    BP (!) 120/92 (BP 1 Location: Left arm, BP Patient Position: Sitting)    Pulse 65    Temp 97.3 °F (36.3 °C) (Oral)    Resp 12    Ht 5' 6\" (1.676 m)    Wt 186 lb 8 oz (84.6 kg)    LMP 01/31/2017    SpO2 97%    BMI 30.1 kg/m2     Patient's last menstrual period was 01/31/2017.     Waist Circumference: I personally reviewed patient's Weight Management Doc Flowsheet  Neck Circumference: I personally reviewed patient's Weight Management Doc Flowsheet  Percent Body Fat: I personally reviewed patient's Weight Management Doc Flowsheet    Physical Exam  Appearance: well appearing, obese, A&O, NAD  HEENT:  NC/AT, PERRL, No scleral icterus  Heart:  RRR without M/R/G  Lungs:  CTAB, no rhonchi, rales, or wheezes with good air exchange   Ext:  No LE Edema    Assessment / Plan    Encounter Diagnoses   Name Primary?  Obesity (BMI 30-39. 9) Yes    Essential hypertension with goal blood pressure less than 140/90        1. Weight management improved   Progress was reviewed with patient  Weight loss goal for 4 weeks: 10    Lbs.   2.  Labs    Latest results reviewed with patient   Lab slip given to pt for f/up HDL labs    3. HTN: continues on losartan 25mg and HCTZ 25mg daily, she did not decrease dose of this. BP noted to be slightly elevated today, diastolic. She has an apt with her PCP this month and would prefer to discuss medication changes with her. Did discuss need to decrease HCTZ with any low BP, will continue to monitor weekly BPs. Patient Instructions   Goals       Weight loss goal for 4 weeks: 10    Lbs. You can do it!!! Body Mass Index: Care Instructions  Your Care Instructions    Body mass index (BMI) can help you see if your weight is raising your risk for health problems. It uses a formula to compare how much you weigh with how tall you are. A BMI between 18.5 and 24.9 is considered healthy. A BMI between 25 and 29.9 is considered overweight. A BMI of 30 or higher is considered obese. If your BMI is in the normal range, it means that you have a lower risk for weight-related health problems. If your BMI is in the overweight or obese range, you may be at increased risk for weight-related health problems, such as high blood pressure, heart disease, stroke, arthritis or joint pain, and diabetes. BMI is just one measure of your risk for weight-related health problems.  You may be at higher risk for health problems if you are not active, you eat an unhealthy diet, or you drink too much alcohol or use tobacco products. Follow-up care is a key part of your treatment and safety. Be sure to make and go to all appointments, and call your doctor if you are having problems. It's also a good idea to know your test results and keep a list of the medicines you take. How can you care for yourself at home? · Practice healthy eating habits. This includes eating plenty of fruits, vegetables, whole grains, lean protein, and low-fat dairy. · Get at least 30 minutes of exercise 5 days a week or more. Brisk walking is a good choice. You also may want to do other activities, such as running, swimming, cycling, or playing tennis or team sports. · Do not smoke. Smoking can increase your risk for health problems. If you need help quitting, talk to your doctor about stop-smoking programs and medicines. These can increase your chances of quitting for good. · Limit alcohol to 2 drinks a day for men and 1 drink a day for women. Too much alcohol can cause health problems. If you have a BMI higher than 25  · Your doctor may do other tests to check your risk for weight-related health problems. This may include measuring the distance around your waist. A waist measurement of more than 40 inches in men or 35 inches in women can increase the risk of weight-related health problems. · Talk with your doctor about steps you can take to stay healthy or improve your health. You may need to make lifestyle changes to lose weight and stay healthy, such as changing your diet and getting regular exercise. Where can you learn more? Go to http://kathy-shamar.info/. Enter S176 in the search box to learn more about \"Body Mass Index: Care Instructions. \"  Current as of: February 16, 2016  Content Version: 11.1  © 9628-5810 [employer].  Care instructions adapted under license by [employer] (which disclaims liability or warranty for this information). If you have questions about a medical condition or this instruction, always ask your healthcare professional. [address] any warranty or liability for your use of this information. Follow-up Disposition:  Return in about 4 weeks (around 4/19/2017). 10 minutes of the 15 minutes face to face time with Liz Goodwin consisted of counseling & coordinating and/or discussing treatment plans in reference to her Obesity. The primary encounter diagnosis was Obesity (BMI 30-39.9). A diagnosis of Essential hypertension with goal blood pressure less than 140/90 was also pertinent to this visit. The patient is to follow up as scheduled and will report to the ED or the office if symptoms change or increase. The patient has voiced understanding and will comply.

## 2017-03-22 NOTE — PATIENT INSTRUCTIONS
Goals       Weight loss goal for 4 weeks: 10    Lbs. You can do it!!! Body Mass Index: Care Instructions  Your Care Instructions    Body mass index (BMI) can help you see if your weight is raising your risk for health problems. It uses a formula to compare how much you weigh with how tall you are. A BMI between 18.5 and 24.9 is considered healthy. A BMI between 25 and 29.9 is considered overweight. A BMI of 30 or higher is considered obese. If your BMI is in the normal range, it means that you have a lower risk for weight-related health problems. If your BMI is in the overweight or obese range, you may be at increased risk for weight-related health problems, such as high blood pressure, heart disease, stroke, arthritis or joint pain, and diabetes. BMI is just one measure of your risk for weight-related health problems. You may be at higher risk for health problems if you are not active, you eat an unhealthy diet, or you drink too much alcohol or use tobacco products. Follow-up care is a key part of your treatment and safety. Be sure to make and go to all appointments, and call your doctor if you are having problems. It's also a good idea to know your test results and keep a list of the medicines you take. How can you care for yourself at home? · Practice healthy eating habits. This includes eating plenty of fruits, vegetables, whole grains, lean protein, and low-fat dairy. · Get at least 30 minutes of exercise 5 days a week or more. Brisk walking is a good choice. You also may want to do other activities, such as running, swimming, cycling, or playing tennis or team sports. · Do not smoke. Smoking can increase your risk for health problems. If you need help quitting, talk to your doctor about stop-smoking programs and medicines. These can increase your chances of quitting for good. · Limit alcohol to 2 drinks a day for men and 1 drink a day for women. Too much alcohol can cause health problems.   If you have a BMI higher than 25  · Your doctor may do other tests to check your risk for weight-related health problems. This may include measuring the distance around your waist. A waist measurement of more than 40 inches in men or 35 inches in women can increase the risk of weight-related health problems. · Talk with your doctor about steps you can take to stay healthy or improve your health. You may need to make lifestyle changes to lose weight and stay healthy, such as changing your diet and getting regular exercise. Where can you learn more? Go to http://kathy-shamar.info/. Enter S176 in the search box to learn more about \"Body Mass Index: Care Instructions. \"  Current as of: February 16, 2016  Content Version: 11.1  © 5773-1924 [employer]. Care instructions adapted under license by [employer] (which disclaims liability or warranty for this information). If you have questions about a medical condition or this instruction, always ask your healthcare professional. [address] any warranty or liability for your use of this information.

## 2017-03-22 NOTE — PROGRESS NOTES
Chief Complaint   Patient presents with    Weight Management     1. Have you been to the ER, urgent care clinic since your last visit? Hospitalized since your last visit? No    2. Have you seen or consulted any other health care providers outside of the [address] [place] [address] since your last visit? Include any pap smears or colon screening.  No

## 2017-03-22 NOTE — PROGRESS NOTES
Nurse note from patient's weekly VLCD / LCD / Maintenance class was reviewed. Pertinent medical concerns were:  None noted. Continue current regimen.

## 2017-03-31 ENCOUNTER — CLINICAL SUPPORT (OUTPATIENT)
Dept: FAMILY MEDICINE CLINIC | Age: 47
End: 2017-03-31

## 2017-03-31 VITALS
HEIGHT: 66 IN | HEART RATE: 69 BPM | BODY MASS INDEX: 29.22 KG/M2 | DIASTOLIC BLOOD PRESSURE: 87 MMHG | WEIGHT: 181.8 LBS | SYSTOLIC BLOOD PRESSURE: 125 MMHG

## 2017-03-31 DIAGNOSIS — E66.3 OVERWEIGHT (BMI 25.0-29.9): Primary | ICD-10-CM

## 2017-03-31 NOTE — PROGRESS NOTES
Progress Note: Weekly Education Class in the [facility] Weight Loss Program   Is there anything that you or the patient needs to let the [address] [facility] Physician know about? no  Over the past week, have you experienced any side-effects? no    Cary Trotter is a 52 y.o. female who is enrolled in [facility]    Visit Vitals    /87 (BP 1 Location: Left arm, BP Patient Position: Sitting)    Pulse 69    Ht 5' 6\" (1.676 m)    Wt 181 lb 12.8 oz (82.5 kg)    BMI 29.34 kg/m2     Weight Metrics 3/31/2017 3/22/2017 3/22/2017 3/17/2017 3/17/2017 3/10/2017 3/3/2017   Weight 181 lb 12.8 oz - 186 lb 8 oz - 186 lb 3.2 oz 188 lb 3.2 oz 188 lb 12.8 oz   Waist Measure Inches 30 29 - 29 - 30 30   Exercise Mins/week - 420 - - - - -   Body Fat % - 36.8 - - - - -   BMI 29.34 kg/m2 - 30.1 kg/m2 - 30.05 kg/m2 30.38 kg/m2 30.47 kg/m2         Have you received any other medical care this week? no  If yes, where and for what? Have you had any change in your medications since your last visit? no  If yes what? Did you have any problems adhering to the program last week? no  If yes, please explain:       Eating Habits Over Last Week:  Did you take in 64 oz of non-caloric fluids? yes     Did you consume your 4 meal replacements each day?  yes       Physical Activity Over the Past Week:    Aerobic exercise: 120 min  Resistance exercise: 60 min workouts / week

## 2017-04-03 PROBLEM — E66.3 OVERWEIGHT (BMI 25.0-29.9): Status: ACTIVE | Noted: 2017-04-03

## 2017-04-14 ENCOUNTER — CLINICAL SUPPORT (OUTPATIENT)
Dept: FAMILY MEDICINE CLINIC | Age: 47
End: 2017-04-14

## 2017-04-14 VITALS
HEIGHT: 66 IN | SYSTOLIC BLOOD PRESSURE: 124 MMHG | HEART RATE: 89 BPM | BODY MASS INDEX: 29.8 KG/M2 | DIASTOLIC BLOOD PRESSURE: 88 MMHG | WEIGHT: 185.4 LBS

## 2017-04-14 DIAGNOSIS — E66.3 OVERWEIGHT (BMI 25.0-29.9): Primary | ICD-10-CM

## 2017-04-14 NOTE — PROGRESS NOTES
Progress Note: Weekly Education Class in the [facility] Weight Loss Program   Is there anything that you or the patient needs to let Dr Lolly Durant know about? no  Over the past week, have you experienced any side-effects? no    Jesús Coelho is a 52 y.o. female who is enrolled in [facility]    Visit Vitals    /88    Pulse 89    Ht 5' 6\" (1.676 m)    Wt 185 lb 6.4 oz (84.1 kg)    BMI 29.92 kg/m2     Weight Metrics 4/14/2017 3/31/2017 3/22/2017 3/22/2017 3/17/2017 3/17/2017 3/10/2017   Weight 185 lb 6.4 oz 181 lb 12.8 oz - 186 lb 8 oz - 186 lb 3.2 oz 188 lb 3.2 oz   Waist Measure Inches 29 30 29 - 29 - 30   Exercise Mins/week - - 420 - - - -   Body Fat % - - 36.8 - - - -   BMI 29.92 kg/m2 29.34 kg/m2 - 30.1 kg/m2 - 30.05 kg/m2 30.38 kg/m2         Have you received any other medical care this week? no  If yes, where and for what? Have you had any change in your medications since your last visit? no  If yes what? Did you have any problems adhering to the program last week? no  If yes, please explain:       Eating Habits Over Last Week:  Did you take in 64 oz of non-caloric fluids? yes     Did you consume your 4 meal replacements each day?  yes       Physical Activity Over the Past Week:    Aerobic exercise: 0 min  Resistance exercise: 0 workouts / week

## 2017-04-22 DIAGNOSIS — I10 ESSENTIAL HYPERTENSION WITH GOAL BLOOD PRESSURE LESS THAN 140/90: ICD-10-CM

## 2017-04-22 DIAGNOSIS — E66.9 OBESITY (BMI 30-39.9): ICD-10-CM

## 2017-04-28 ENCOUNTER — CLINICAL SUPPORT (OUTPATIENT)
Dept: FAMILY MEDICINE CLINIC | Age: 47
End: 2017-04-28

## 2017-04-28 VITALS
HEIGHT: 66 IN | SYSTOLIC BLOOD PRESSURE: 128 MMHG | DIASTOLIC BLOOD PRESSURE: 90 MMHG | WEIGHT: 183.2 LBS | BODY MASS INDEX: 29.44 KG/M2 | HEART RATE: 79 BPM

## 2017-04-28 DIAGNOSIS — E66.9 OBESITY (BMI 30-39.9): Primary | ICD-10-CM

## 2017-04-28 NOTE — PROGRESS NOTES
Progress Note: Weekly Education Class in the [facility] Weight Loss Program   Is there anything that you or the patient needs to let Dr Khadijah Vargas know about? no  Over the past week, have you experienced any side-effects? no    Macrina Armando is a 52 y.o. female who is enrolled in [facility] the [facility]    Visit Vitals    /90    Pulse 79    Ht 5' 6\" (1.676 m)    Wt 183 lb 3.2 oz (83.1 kg)    BMI 29.57 kg/m2     Weight Metrics 4/28/2017 4/14/2017 3/31/2017 3/22/2017 3/22/2017 3/17/2017 3/17/2017   Weight 183 lb 3.2 oz 185 lb 6.4 oz 181 lb 12.8 oz - 186 lb 8 oz - 186 lb 3.2 oz   Waist Measure Inches - 29 30 29 - 29 -   Exercise Mins/week - - - 420 - - -   Body Fat % - - - 36.8 - - -   BMI 29.57 kg/m2 29.92 kg/m2 29.34 kg/m2 - 30.1 kg/m2 - 30.05 kg/m2         Have you received any other medical care this week? no  If yes, where and for what? Have you had any change in your medications since your last visit? no  If yes what? Did you have any problems adhering to the program last week? no  If yes, please explain:       Eating Habits Over Last Week:  Did you take in 64 oz of non-caloric fluids? yes     Did you consume your 4 meal replacements each day?  yes       Physical Activity Over the Past Week:    Aerobic exercise: 0 min  Resistance exercise: 0 workouts / week

## 2017-05-02 NOTE — PROGRESS NOTES
Nurse note from patient's weekly VLCD / LCD / Maintenance class was reviewed. Pertinent medical concerns were:  None noted.      Continue current  Increase exercise

## 2017-05-05 ENCOUNTER — CLINICAL SUPPORT (OUTPATIENT)
Dept: FAMILY MEDICINE CLINIC | Age: 47
End: 2017-05-05

## 2017-05-05 VITALS
BODY MASS INDEX: 29.7 KG/M2 | WEIGHT: 184.8 LBS | HEIGHT: 66 IN | HEART RATE: 68 BPM | SYSTOLIC BLOOD PRESSURE: 130 MMHG | DIASTOLIC BLOOD PRESSURE: 88 MMHG

## 2017-05-05 DIAGNOSIS — E66.3 OVERWEIGHT (BMI 25.0-29.9): Primary | ICD-10-CM

## 2017-05-05 NOTE — PROGRESS NOTES
Progress Note: Weekly Education Class in the [facility] Weight Loss Program   Is there anything that you or the patient needs to let the [address] [facility] Physician know about? no  Over the past week, have you experienced any side-effects? no    Jesús Coelho is a 52 y.o. female who is enrolled in [facility] Loss Program    Visit Vitals    /88 (BP 1 Location: Right arm, BP Patient Position: Sitting)    Pulse 68    Ht 5' 6\" (1.676 m)    Wt 184 lb 12.8 oz (83.8 kg)    BMI 29.83 kg/m2     Weight Metrics 5/5/2017 4/28/2017 4/28/2017 4/14/2017 3/31/2017 3/22/2017 3/22/2017   Weight 184 lb 12.8 oz - 183 lb 3.2 oz 185 lb 6.4 oz 181 lb 12.8 oz - 186 lb 8 oz   Waist Measure Inches 29 32 - 29 30 29 -   Exercise Mins/week - - - - - 420 -   Body Fat % - - - - - 36.8 -   BMI 29.83 kg/m2 - 29.57 kg/m2 29.92 kg/m2 29.34 kg/m2 - 30.1 kg/m2         Have you received any other medical care this week? no  If yes, where and for what? Have you had any change in your medications since your last visit? no  If yes what? Did you have any problems adhering to the program last week? no  If yes, please explain:       Eating Habits Over Last Week:  Did you take in 64 oz of non-caloric fluids?  no     Did you consume your 4 meal replacements each day? no       Physical Activity Over the Past Week:    Aerobic exercise: 0 min  Resistance exercise: 0 workouts / week

## 2017-05-12 ENCOUNTER — CLINICAL SUPPORT (OUTPATIENT)
Dept: FAMILY MEDICINE CLINIC | Age: 47
End: 2017-05-12

## 2017-05-12 VITALS
SYSTOLIC BLOOD PRESSURE: 127 MMHG | WEIGHT: 185.2 LBS | DIASTOLIC BLOOD PRESSURE: 87 MMHG | BODY MASS INDEX: 29.77 KG/M2 | HEIGHT: 66 IN | HEART RATE: 73 BPM

## 2017-05-12 DIAGNOSIS — E66.9 OBESITY (BMI 30-39.9): Primary | ICD-10-CM

## 2017-05-12 NOTE — PROGRESS NOTES
Progress Note: Weekly Education Class in the [facility] Weight Loss Program   Is there anything that you or the patient needs to let the [facility] Physician know about? no  Over the past week, have you experienced any side-effects? no    Sandhya Garza is a 52 y.o. female who is enrolled in [facility] Weight Loss Program    Visit Vitals    /87 (BP 1 Location: Right arm, BP Patient Position: Sitting)    Pulse 73    Ht 5' 6\" (1.676 m)    Wt 185 lb 3.2 oz (84 kg)    BMI 29.89 kg/m2     Weight Metrics 5/12/2017 5/5/2017 4/28/2017 4/28/2017 4/14/2017 3/31/2017 3/22/2017   Weight 185 lb 3.2 oz 184 lb 12.8 oz - 183 lb 3.2 oz 185 lb 6.4 oz 181 lb 12.8 oz -   Waist Measure Inches 30 29 32 - 29 30 29   Exercise Mins/week - - - - - - 420   Body Fat % - - - - - - 36.8   BMI 29.89 kg/m2 29.83 kg/m2 - 29.57 kg/m2 29.92 kg/m2 29.34 kg/m2 -         Have you received any other medical care this week? no  If yes, where and for what? Have you had any change in your medications since your last visit? no  If yes what? Did you have any problems adhering to the program last week? no  If yes, please explain:       Eating Habits Over Last Week:  Did you take in 64 oz of non-caloric fluids? yes     Did you consume your 4 meal replacements each day?  yes       Physical Activity Over the Past Week:    Aerobic exercise: 0 min  Resistance exercise: 0 workouts / week

## 2017-05-19 ENCOUNTER — CLINICAL SUPPORT (OUTPATIENT)
Dept: FAMILY MEDICINE CLINIC | Age: 47
End: 2017-05-19

## 2017-05-19 VITALS
BODY MASS INDEX: 30.05 KG/M2 | RESPIRATION RATE: 20 BRPM | SYSTOLIC BLOOD PRESSURE: 135 MMHG | DIASTOLIC BLOOD PRESSURE: 97 MMHG | HEART RATE: 65 BPM | WEIGHT: 187 LBS | HEIGHT: 66 IN

## 2017-05-19 DIAGNOSIS — E66.9 OBESITY (BMI 30-39.9): Primary | ICD-10-CM

## 2017-05-19 NOTE — PROGRESS NOTES
Progress Note: Weekly Education Class in the [facility] Weight Loss Program   Is there anything that you or the patient needs to let the [facility] Physician know about? Yes, the patient states she ate off the program this past week, she went to [employer] and ate a lot of Crab [employer]s with Butter and a few other food items. The patient gained a few pounds and a few more inches on the waist measurement. The patient has a tendency to gain weight and her blood pressure will go up. The patient will be getting back on track this next week and we will see if she loses weight and if her blood pressure comes down. Over the past week, have you experienced any side-effects? no    Mckayla Aburto is a 52 y.o. female who is enrolled in [facility] Weight Loss Program    Visit Vitals    BP (!) 135/97 (BP 1 Location: Right arm, BP Patient Position: Sitting)  Comment: eating at [employer] Crab [employer]s    Pulse 65    Resp 20    Ht 5' 6\" (1.676 m)    Wt 187 lb (84.8 kg)    BMI 30.18 kg/m2     Weight Metrics 5/19/2017 5/12/2017 5/5/2017 4/28/2017 4/28/2017 4/14/2017 3/31/2017   Weight 187 lb 185 lb 3.2 oz 184 lb 12.8 oz - 183 lb 3.2 oz 185 lb 6.4 oz 181 lb 12.8 oz   Waist Measure Inches 31 30 29 32 - 29 30   Exercise Mins/week - - - - - - -   Body Fat % - - - - - - -   BMI 30.18 kg/m2 29.89 kg/m2 29.83 kg/m2 - 29.57 kg/m2 29.92 kg/m2 29.34 kg/m2         Have you received any other medical care this week? no  If yes, where and for what? Have you had any change in your medications since your last visit? no  If yes what? Did you have any problems adhering to the program last week? yes  If yes, please explain: Mother's Day and ate off the program diet      Eating Habits Over Last Week:  Did you take in 64 oz of non-caloric fluids?  no     Did you consume your 4 meal replacements each day? no       Physical Activity Over the Past Week:    Aerobic exercise: 0 min  Resistance exercise: 0 workouts / week

## 2017-05-24 ENCOUNTER — HOSPITAL ENCOUNTER (OUTPATIENT)
Dept: LAB | Age: 47
Discharge: HOME | End: 2017-05-24
Payer: COMMERCIAL

## 2017-05-24 LAB
ALBUMIN SERPL BCP-MCNC: 3.4 G/DL (ref 3.4–5)
ALBUMIN/GLOB SERPL: 0.9 {RATIO} (ref 0.8–1.7)
ALP SERPL-CCNC: 77 U/L (ref 45–117)
ALT SERPL-CCNC: 29 U/L (ref 13–56)
ANION GAP BLD CALC-SCNC: 7 MMOL/L (ref 3–18)
APPEARANCE UR: CLEAR
AST SERPL W P-5'-P-CCNC: 15 U/L (ref 15–37)
BASOPHILS # BLD AUTO: 0 K/UL (ref 0–0.06)
BASOPHILS # BLD: 0 % (ref 0–2)
BILIRUB SERPL-MCNC: 0.4 MG/DL (ref 0.2–1)
BILIRUB UR QL: NEGATIVE
BUN SERPL-MCNC: 13 MG/DL (ref 7–18)
BUN/CREAT SERPL: 14 (ref 12–20)
CALCIUM SERPL-MCNC: 9.1 MG/DL (ref 8.5–10.1)
CHLORIDE SERPL-SCNC: 105 MMOL/L (ref 100–108)
CHOLEST SERPL-MCNC: 172 MG/DL
CO2 SERPL-SCNC: 30 MMOL/L (ref 21–32)
COLOR UR: YELLOW
CREAT SERPL-MCNC: 0.95 MG/DL (ref 0.6–1.3)
DIFFERENTIAL METHOD BLD: ABNORMAL
EOSINOPHIL # BLD: 0.1 K/UL (ref 0–0.4)
EOSINOPHIL NFR BLD: 2 % (ref 0–5)
ERYTHROCYTE [DISTWIDTH] IN BLOOD BY AUTOMATED COUNT: 17.3 % (ref 11.6–14.5)
GLOBULIN SER CALC-MCNC: 3.6 G/DL (ref 2–4)
GLUCOSE SERPL-MCNC: 104 MG/DL (ref 74–99)
GLUCOSE UR STRIP.AUTO-MCNC: NEGATIVE MG/DL
HCT VFR BLD AUTO: 37.2 % (ref 35–45)
HDLC SERPL-MCNC: 84 MG/DL (ref 40–60)
HDLC SERPL: 2 {RATIO} (ref 0–5)
HGB BLD-MCNC: 11.6 G/DL (ref 12–16)
HGB UR QL STRIP: NEGATIVE
KETONES UR QL STRIP.AUTO: NEGATIVE MG/DL
LDLC SERPL CALC-MCNC: 77.2 MG/DL (ref 0–100)
LEUKOCYTE ESTERASE UR QL STRIP.AUTO: NEGATIVE
LIPID PROFILE,FLP: ABNORMAL
LYMPHOCYTES # BLD AUTO: 23 % (ref 21–52)
LYMPHOCYTES # BLD: 1.7 K/UL (ref 0.9–3.6)
MAGNESIUM SERPL-MCNC: 2 MG/DL (ref 1.6–2.6)
MCH RBC QN AUTO: 26 PG (ref 24–34)
MCHC RBC AUTO-ENTMCNC: 31.2 G/DL (ref 31–37)
MCV RBC AUTO: 83.2 FL (ref 74–97)
MONOCYTES # BLD: 0.4 K/UL (ref 0.05–1.2)
MONOCYTES NFR BLD AUTO: 5 % (ref 3–10)
NEUTS SEG # BLD: 5.1 K/UL (ref 1.8–8)
NEUTS SEG NFR BLD AUTO: 70 % (ref 40–73)
NITRITE UR QL STRIP.AUTO: NEGATIVE
PH UR STRIP: 5.5 [PH] (ref 5–8)
PLATELET # BLD AUTO: 370 K/UL (ref 135–420)
PMV BLD AUTO: 9.7 FL (ref 9.2–11.8)
POTASSIUM SERPL-SCNC: 3.8 MMOL/L (ref 3.5–5.5)
PROT SERPL-MCNC: 7 G/DL (ref 6.4–8.2)
PROT UR STRIP-MCNC: NEGATIVE MG/DL
RBC # BLD AUTO: 4.47 M/UL (ref 4.2–5.3)
SODIUM SERPL-SCNC: 142 MMOL/L (ref 136–145)
SP GR UR REFRACTOMETRY: 1.02 (ref 1–1.03)
TRIGL SERPL-MCNC: 54 MG/DL (ref ?–150)
TSH SERPL DL<=0.05 MIU/L-ACNC: 0.6 UIU/ML (ref 0.36–3.74)
URATE SERPL-MCNC: 3 MG/DL (ref 2.6–7.2)
UROBILINOGEN UR QL STRIP.AUTO: 0.2 EU/DL (ref 0.2–1)
VLDLC SERPL CALC-MCNC: 10.8 MG/DL
WBC # BLD AUTO: 7.2 K/UL (ref 4.6–13.2)

## 2017-05-24 PROCEDURE — 83735 ASSAY OF MAGNESIUM: CPT | Performed by: NURSE PRACTITIONER

## 2017-05-24 PROCEDURE — 81003 URINALYSIS AUTO W/O SCOPE: CPT | Performed by: NURSE PRACTITIONER

## 2017-05-24 PROCEDURE — 80053 COMPREHEN METABOLIC PANEL: CPT | Performed by: NURSE PRACTITIONER

## 2017-05-24 PROCEDURE — 84550 ASSAY OF BLOOD/URIC ACID: CPT | Performed by: NURSE PRACTITIONER

## 2017-05-24 PROCEDURE — 85025 COMPLETE CBC W/AUTO DIFF WBC: CPT | Performed by: NURSE PRACTITIONER

## 2017-05-24 PROCEDURE — 84443 ASSAY THYROID STIM HORMONE: CPT | Performed by: NURSE PRACTITIONER

## 2017-05-24 PROCEDURE — 80061 LIPID PANEL: CPT | Performed by: NURSE PRACTITIONER

## 2017-05-24 PROCEDURE — 36415 COLL VENOUS BLD VENIPUNCTURE: CPT | Performed by: NURSE PRACTITIONER

## 2017-05-26 ENCOUNTER — CLINICAL SUPPORT (OUTPATIENT)
Dept: FAMILY MEDICINE CLINIC | Age: 47
End: 2017-05-26

## 2017-05-26 VITALS
SYSTOLIC BLOOD PRESSURE: 143 MMHG | BODY MASS INDEX: 31.02 KG/M2 | HEIGHT: 66 IN | HEART RATE: 68 BPM | WEIGHT: 193 LBS | DIASTOLIC BLOOD PRESSURE: 91 MMHG

## 2017-05-26 DIAGNOSIS — E66.9 OBESITY (BMI 30-39.9): Primary | ICD-10-CM

## 2017-05-26 NOTE — PROGRESS NOTES
Progress Note: Weekly Education Class in the [facility] Weight Loss Program   Is there anything that you or the patient needs to let the [address] [facility] Physician know about? no  Over the past week, have you experienced any side-effects? no    Mckayla Aburto is a 52 y.o. female who is enrolled in [facility] Weight Loss Program    Visit Vitals    BP (!) 143/91 (BP 1 Location: Right arm, BP Patient Position: Sitting)    Pulse 68    Ht 5' 6\" (1.676 m)    Wt 193 lb (87.5 kg)    BMI 31.15 kg/m2     Weight Metrics 5/26/2017 5/19/2017 5/12/2017 5/5/2017 4/28/2017 4/28/2017 4/14/2017   Weight 193 lb 187 lb 185 lb 3.2 oz 184 lb 12.8 oz - 183 lb 3.2 oz 185 lb 6.4 oz   Waist Measure Inches 32 31 30 29 32 - 29   Exercise Mins/week - - - - - - -   Body Fat % - - - - - - -   BMI 31.15 kg/m2 30.18 kg/m2 29.89 kg/m2 29.83 kg/m2 - 29.57 kg/m2 29.92 kg/m2         Have you received any other medical care this week? no  If yes, where and for what? Have you had any change in your medications since your last visit? no  If yes what? Did you have any problems adhering to the program last week? no  If yes, please explain:       Eating Habits Over Last Week:  Did you take in 64 oz of non-caloric fluids? yes     Did you consume your 4 meal replacements each day?  yes       Physical Activity Over the Past Week:    Aerobic exercise: 0 min  Resistance exercise: 0 workouts / week

## 2017-06-01 ENCOUNTER — OFFICE VISIT (OUTPATIENT)
Dept: INTERNAL MEDICINE CLINIC | Age: 47
End: 2017-06-01

## 2017-06-01 VITALS
WEIGHT: 189.4 LBS | TEMPERATURE: 98.1 F | DIASTOLIC BLOOD PRESSURE: 85 MMHG | HEART RATE: 70 BPM | SYSTOLIC BLOOD PRESSURE: 119 MMHG | BODY MASS INDEX: 30.44 KG/M2 | RESPIRATION RATE: 14 BRPM | HEIGHT: 66 IN | OXYGEN SATURATION: 97 %

## 2017-06-01 DIAGNOSIS — E66.9 OBESITY (BMI 30-39.9): Primary | ICD-10-CM

## 2017-06-01 NOTE — PATIENT INSTRUCTIONS
Health Maintenance Due   Topic Date Due    DTaP/Tdap/Td series (1 - Tdap) 01/05/1991     Monthly goal:    Continue work outs  8-10 lbs weight loss  That will make you overcome obesity! Body Mass Index: Care Instructions  Your Care Instructions    Body mass index (BMI) can help you see if your weight is raising your risk for health problems. It uses a formula to compare how much you weigh with how tall you are. · A BMI lower than 18.5 is considered underweight. · A BMI between 18.5 and 24.9 is considered healthy. · A BMI between 25 and 29.9 is considered overweight. A BMI of 30 or higher is considered obese. If your BMI is in the normal range, it means that you have a lower risk for weight-related health problems. If your BMI is in the overweight or obese range, you may be at increased risk for weight-related health problems, such as high blood pressure, heart disease, stroke, arthritis or joint pain, and diabetes. If your BMI is in the underweight range, you may be at increased risk for health problems such as fatigue, lower protection (immunity) against illness, muscle loss, bone loss, hair loss, and hormone problems. BMI is just one measure of your risk for weight-related health problems. You may be at higher risk for health problems if you are not active, you eat an unhealthy diet, or you drink too much alcohol or use tobacco products. Follow-up care is a key part of your treatment and safety. Be sure to make and go to all appointments, and call your doctor if you are having problems. It's also a good idea to know your test results and keep a list of the medicines you take. How can you care for yourself at home? · Practice healthy eating habits. This includes eating plenty of fruits, vegetables, whole grains, lean protein, and low-fat dairy. · If your doctor recommends it, get more exercise. Walking is a good choice. Bit by bit, increase the amount you walk every day.  Try for at least 30 minutes on most days of the week. · Do not smoke. Smoking can increase your risk for health problems. If you need help quitting, talk to your doctor about stop-smoking programs and medicines. These can increase your chances of quitting for good. · Limit alcohol to 2 drinks a day for men and 1 drink a day for women. Too much alcohol can cause health problems. If you have a BMI higher than 25  · Your doctor may do other tests to check your risk for weight-related health problems. This may include measuring the distance around your waist. A waist measurement of more than 40 inches in men or 35 inches in women can increase the risk of weight-related health problems. · Talk with your doctor about steps you can take to stay healthy or improve your health. You may need to make lifestyle changes to lose weight and stay healthy, such as changing your diet and getting regular exercise. If you have a BMI lower than 18.5  · Your doctor may do other tests to check your risk for health problems. · Talk with your doctor about steps you can take to stay healthy or improve your health. You may need to make lifestyle changes to gain or maintain weight and stay healthy, such as getting more healthy foods in your diet and doing exercises to build muscle. Where can you learn more? Go to http://kathy-shamar.info/. Enter S176 in the search box to learn more about \"Body Mass Index: Care Instructions. \"  Current as of: January 23, 2017  Content Version: 11.2  © 2801-0228 [employer]. Care instructions adapted under license by [employer] (which disclaims liability or warranty for this information). If you have questions about a medical condition or this instruction, always ask your healthcare professional. [address] any warranty or liability for your use of this information.

## 2017-06-01 NOTE — MR AVS SNAPSHOT
Visit Information Date & Time Provider Department Dept. Phone Encounter #  
 6/1/2017  9:30 AM Jeremy Boyer NP Internist of [facility] [id number] Follow-up Instructions Return in about 4 weeks (around 6/29/2017). Your Appointments 7/13/2017  8:30 AM  
METABOLIC PROGRAM 30 with Jeremy Boyer NP Internist of [facility] ([facility]) Appt Note: metabolic program janet [address], [address] [place] [address] [place]  
  
   
 [address], [address] Upcoming Health Maintenance Date Due DTaP/Tdap/Td series (1 - Tdap) 1/5/1991 INFLUENZA AGE 9 TO ADULT 8/1/2017 PAP AKA CERVICAL CYTOLOGY 12/6/2019 Allergies as of 6/1/2017  Review Complete On: 6/1/2017 By: Jeremy Boyer NP Severity Noted Reaction Type Reactions Percocet [Oxycodone-acetaminophen]  04/07/2015    Itching Current Immunizations  Never Reviewed No immunizations on file. Not reviewed this visit You Were Diagnosed With   
  
 Codes Comments Obesity (BMI 30-39.9)    -  Primary ICD-10-CM: G73.9 ICD-9-CM: 278.00 Vitals BP Pulse Temp Resp Height(growth percentile) Weight(growth percentile) 119/85 (BP 1 Location: Right arm, BP Patient Position: Sitting) 70 98.1 °F (36.7 °C) (Oral) 14 5' 6\" (1.676 m) 189 lb 6.4 oz (85.9 kg) SpO2 BMI OB Status Smoking Status 97% 30.57 kg/m2 Having regular periods Former Smoker BMI and BSA Data Body Mass Index Body Surface Area 30.57 kg/m 2 2 m 2 Your Updated Medication List  
  
   
This list is accurate as of: 6/1/17  9:54 AM.  Always use your most recent med list.  
  
  
  
  
 biotin 2,500 mcg Tab Take 2,500 mcg by mouth daily. CALCIUM COMPLETE PO Take  by mouth daily. dapsone 7.5 % Glwp  
7.5 % by Apply Externally route daily. Apply to the Face  
  
 dietary supplement Cap Take 1,000 mg by mouth daily. CLA Over The Counter Medication taken two tablets in AM and one tablet PM  
  
 garlic 5,087 mg Cap Take 1,000 mg by mouth daily. hydroCHLOROthiazide 25 mg tablet Commonly known as:  HYDRODIURIL Take 25 mg by mouth daily. Indications: HYPERTENSION  
  
 iron,carbonyl-vitamin C 65 mg iron- 125 mg Tbec Take  mg by mouth daily. losartan 25 mg tablet Commonly known as:  COZAAR Take 25 mg by mouth daily. MEGARED OMEGA-3 KRILL -78-02-50 mg Cap Generic drug:  krill-om-3-dha-epa-phospho-ast  
Take 300 mg by mouth daily. ONE DAILY COMPLETE PO Take  by mouth daily. VITAMIN D3 2,000 unit Tab Generic drug:  cholecalciferol (vitamin D3) Take 2,000 Units by mouth daily. Follow-up Instructions Return in about 4 weeks (around 6/29/2017). To-Do List   
 06/01/2017 Lab:  METABOLIC PANEL, COMPREHENSIVE   
  
 06/01/2017 Lab:  URIC ACID Patient Instructions Health Maintenance Due Topic Date Due  
 DTaP/Tdap/Td series (1 - Tdap) 01/05/1991 Monthly goal: 
 
Continue work outs 8-10 lbs weight loss That will make you overcome obesity! Body Mass Index: Care Instructions Your Care Instructions Body mass index (BMI) can help you see if your weight is raising your risk for health problems. It uses a formula to compare how much you weigh with how tall you are. · A BMI lower than 18.5 is considered underweight. · A BMI between 18.5 and 24.9 is considered healthy. · A BMI between 25 and 29.9 is considered overweight. A BMI of 30 or higher is considered obese. If your BMI is in the normal range, it means that you have a lower risk for weight-related health problems.  If your BMI is in the overweight or obese range, you may be at increased risk for weight-related health problems, such as high blood pressure, heart disease, stroke, arthritis or joint pain, and diabetes. If your BMI is in the underweight range, you may be at increased risk for health problems such as fatigue, lower protection (immunity) against illness, muscle loss, bone loss, hair loss, and hormone problems. BMI is just one measure of your risk for weight-related health problems. You may be at higher risk for health problems if you are not active, you eat an unhealthy diet, or you drink too much alcohol or use tobacco products. Follow-up care is a key part of your treatment and safety. Be sure to make and go to all appointments, and call your doctor if you are having problems. It's also a good idea to know your test results and keep a list of the medicines you take. How can you care for yourself at home? · Practice healthy eating habits. This includes eating plenty of fruits, vegetables, whole grains, lean protein, and low-fat dairy. · If your doctor recommends it, get more exercise. Walking is a good choice. Bit by bit, increase the amount you walk every day. Try for at least 30 minutes on most days of the week. · Do not smoke. Smoking can increase your risk for health problems. If you need help quitting, talk to your doctor about stop-smoking programs and medicines. These can increase your chances of quitting for good. · Limit alcohol to 2 drinks a day for men and 1 drink a day for women. Too much alcohol can cause health problems. If you have a BMI higher than 25 · Your doctor may do other tests to check your risk for weight-related health problems. This may include measuring the distance around your waist. A waist measurement of more than 40 inches in men or 35 inches in women can increase the risk of weight-related health problems. · Talk with your doctor about steps you can take to stay healthy or improve your health. You may need to make lifestyle changes to lose weight and stay healthy, such as changing your diet and getting regular exercise. If you have a BMI lower than 18.5 · Your doctor may do other tests to check your risk for health problems. · Talk with your doctor about steps you can take to stay healthy or improve your health. You may need to make lifestyle changes to gain or maintain weight and stay healthy, such as getting more healthy foods in your diet and doing exercises to build muscle. Where can you learn more? Go to http://kathy-shamar.info/. Enter S176 in the search box to learn more about \"Body Mass Index: Care Instructions. \" Current as of: January 23, 2017 Content Version: 11.2 © 5339-1705 [employer]. Care instructions adapted under license by [employer] (which disclaims liability or warranty for this information). If you have questions about a medical condition or this instruction, always ask your healthcare professional. [address] any warranty or liability for your use of this information. Please provide this summary of care documentation to your next provider. Your primary care clinician is listed as Avery Akins. If you have any questions after today's visit, please call [phone].

## 2017-06-01 NOTE — PROGRESS NOTES
[facility] Progress Note:   F/up Physician Visit    CC: Obesity      Eric Bowman is a 52 y.o. female who is here for her  f/up physician visit for the VLCD Program. Sol Pearson has completed 51 weeks of the program to date.       She is exercising 3 times a week, treadmill walking/jog for 30 minutes. She has also started resistance training.       HTN: improved.       Starting weight: 206 lbs  Current weight: 189 lbs    Goal weight: 150 lbs      Most recent EK2016 at 206 lbs    Weight Metrics 2017   Weight - 189 lb 6.4 oz 193 lb 187 lb 185 lb 3.2 oz 184 lb 12.8 oz -   Waist Measure Inches 29 - 32 31 30 29 32   Exercise Mins/week 0 - - - - - -   Body Fat % 36.3 - - - - - -   BMI - 30.57 kg/m2 31.15 kg/m2 30.18 kg/m2 29.89 kg/m2 29.83 kg/m2 -         Current Outpatient Prescriptions   Medication Sig Dispense Refill    CALCIUM CARB/MAG OXIDE/C/BETA (CALCIUM COMPLETE PO) Take  by mouth daily.  cholecalciferol, vitamin D3, (VITAMIN D3) 2,000 unit tab Take 2,000 Units by mouth daily.  dietary supplement cap Take 1,000 mg by mouth daily. CLA Over The Counter Medication taken two tablets in AM and one tablet PM      krill-om-3-dha-epa-phospho-ast (MEGARED OMEGA-3 KRILL OIL) 037-84-78-50 mg cap Take 300 mg by mouth daily.  garlic 9,897 mg cap Take 1,000 mg by mouth daily.  losartan (COZAAR) 25 mg tablet Take 25 mg by mouth daily.  dapsone 7.5 % glwp 7.5 % by Apply Externally route daily. Apply to the Face      hydrochlorothiazide (HYDRODIURIL) 25 mg tablet Take 25 mg by mouth daily. Indications: HYPERTENSION  2    iron-vitamin C 65 mg iron- 125 mg TbEC Take  mg by mouth daily.  biotin 2,500 mcg Tab Take 2,500 mcg by mouth daily.  MULTIVITAMIN WITH MINERALS (ONE DAILY COMPLETE PO) Take  by mouth daily. Participation   Did you attend clinic and class last week?  yes    Review of Systems  Since your last visit, have you experienced any complications? no  If yes, please list:     No CP, SOB, palpitations, lightheadedness, dizziness, constipation. Positives highlight in BOLD. Are you taking an appetite suppressant? no  If so, is there any Chest Pain, Palpitations or Dizziness? BP Readings from Last 10 Encounters:   06/01/17 119/85   05/26/17 (!) 143/91   05/19/17 (!) 135/97   05/12/17 127/87   05/05/17 130/88   04/28/17 128/90   04/14/17 124/88   03/31/17 125/87   03/22/17 (!) 120/92   03/17/17 118/84         Have you received any other medical care this week? no  If yes, where and for what? Have you discontinued or changed any medicine or dose of your medicine since your last visit? no  If yes, where and for what? Diet  How many ounces of calorie-free liquids did you consume each day? 64 oz    How many meal replacements did you take each day? 4    Did you have any problems adhering to the program?  no If yes, please explain:       Exercise  Aerobic exercise: 0 min  Resistance exercise: 0 workouts / week  Any discomfort?  no     If yes, where? Review of Systems  Complete ROS negative except where noted above    Objective  Visit Vitals    /85 (BP 1 Location: Right arm, BP Patient Position: Sitting)    Pulse 70    Temp 98.1 °F (36.7 °C) (Oral)    Resp 14    Ht 5' 6\" (1.676 m)    Wt 189 lb 6.4 oz (85.9 kg)    SpO2 97%    BMI 30.57 kg/m2     No LMP recorded.     Waist Circumference: I personally reviewed patient's Weight Management Doc Flowsheet  Neck Circumference: I personally reviewed patient's Weight Management Doc Flowsheet  Percent Body Fat: I personally reviewed patient's Weight Management Doc Flowsheet    Physical Exam  Appearance: well appearing, A&O, NAD  HEENT:  NC/AT, PERRL, No scleral icterus  Heart:  RRR without M/R/G  Lungs:  CTAB, no rhonchi, rales, or wheezes with good air exchange   Ext:  No LE Edema    Assessment / Plan    Encounter Diagnosis   Name Primary?  Obesity (BMI 30-39. 9) Yes       1. Weight management improved   Progress was reviewed with patient    2. Labs    Latest results reviewed with patient   Lab slip given to pt for f/up HDL labs    3. Diet regimen   # of meal replacements prescribed: 4   If modified LCD-nutritional guidelines:    Monthly Goal   As below    Medical monitoring schedule:   Weekly BP/Weight checks   Monthly provider appointments          Patient Instructions     Health Maintenance Due   Topic Date Due    DTaP/Tdap/Td series (1 - Tdap) 01/05/1991     Monthly goal:    Continue work outs  8-10 lbs weight loss  That will make you overcome obesity! Body Mass Index: Care Instructions  Your Care Instructions    Body mass index (BMI) can help you see if your weight is raising your risk for health problems. It uses a formula to compare how much you weigh with how tall you are. · A BMI lower than 18.5 is considered underweight. · A BMI between 18.5 and 24.9 is considered healthy. · A BMI between 25 and 29.9 is considered overweight. A BMI of 30 or higher is considered obese. If your BMI is in the normal range, it means that you have a lower risk for weight-related health problems. If your BMI is in the overweight or obese range, you may be at increased risk for weight-related health problems, such as high blood pressure, heart disease, stroke, arthritis or joint pain, and diabetes. If your BMI is in the underweight range, you may be at increased risk for health problems such as fatigue, lower protection (immunity) against illness, muscle loss, bone loss, hair loss, and hormone problems. BMI is just one measure of your risk for weight-related health problems. You may be at higher risk for health problems if you are not active, you eat an unhealthy diet, or you drink too much alcohol or use tobacco products. Follow-up care is a key part of your treatment and safety.  Be sure to make and go to all appointments, and call your doctor if you are having problems. It's also a good idea to know your test results and keep a list of the medicines you take. How can you care for yourself at home? · Practice healthy eating habits. This includes eating plenty of fruits, vegetables, whole grains, lean protein, and low-fat dairy. · If your doctor recommends it, get more exercise. Walking is a good choice. Bit by bit, increase the amount you walk every day. Try for at least 30 minutes on most days of the week. · Do not smoke. Smoking can increase your risk for health problems. If you need help quitting, talk to your doctor about stop-smoking programs and medicines. These can increase your chances of quitting for good. · Limit alcohol to 2 drinks a day for men and 1 drink a day for women. Too much alcohol can cause health problems. If you have a BMI higher than 25  · Your doctor may do other tests to check your risk for weight-related health problems. This may include measuring the distance around your waist. A waist measurement of more than 40 inches in men or 35 inches in women can increase the risk of weight-related health problems. · Talk with your doctor about steps you can take to stay healthy or improve your health. You may need to make lifestyle changes to lose weight and stay healthy, such as changing your diet and getting regular exercise. If you have a BMI lower than 18.5  · Your doctor may do other tests to check your risk for health problems. · Talk with your doctor about steps you can take to stay healthy or improve your health. You may need to make lifestyle changes to gain or maintain weight and stay healthy, such as getting more healthy foods in your diet and doing exercises to build muscle. Where can you learn more? Go to http://kathy-shamar.info/. Enter S176 in the search box to learn more about \"Body Mass Index: Care Instructions. \"  Current as of: January 23, 2017  Content Version: 11.2  © 4201-7737 [employer]. Care instructions adapted under license by [employer] (which disclaims liability or warranty for this information). If you have questions about a medical condition or this instruction, always ask your healthcare professional. [address] any warranty or liability for your use of this information. Follow-up Disposition:  Return in about 4 weeks (around 6/29/2017). 10 minutes of the 15 minutes face to face time with Nilda Thomas consisted of counseling & coordinating and/or discussing treatment plans in reference to her The encounter diagnosis was Obesity (BMI 30-39.9). The patient is to follow up as scheduled and will report to the ED or the office if symptoms change or increase. The patient has voiced understanding and will comply.

## 2017-06-01 NOTE — PROGRESS NOTES
Chief Complaint   Patient presents with    Weight Management     1. Have you been to the ER, urgent care clinic since your last visit? Hospitalized since your last visit? No    2. Have you seen or consulted any other health care providers outside of the [address] [place] [address] since your last visit? Include any pap smears or colon screening.  No

## 2017-06-23 ENCOUNTER — CLINICAL SUPPORT (OUTPATIENT)
Dept: FAMILY MEDICINE CLINIC | Age: 47
End: 2017-06-23

## 2017-06-23 VITALS
SYSTOLIC BLOOD PRESSURE: 144 MMHG | BODY MASS INDEX: 31.27 KG/M2 | DIASTOLIC BLOOD PRESSURE: 89 MMHG | WEIGHT: 194.6 LBS | HEIGHT: 66 IN | HEART RATE: 60 BPM

## 2017-06-23 DIAGNOSIS — E66.3 OVERWEIGHT (BMI 25.0-29.9): Primary | ICD-10-CM

## 2017-06-23 NOTE — PROGRESS NOTES
Progress Note: Weekly Education Class in the [facility]   Is there anything that you or the patient needs to let the [address] [facility] Physician know about? no  Over the past week, have you experienced any side-effects? no    Kaitlin Brandon is a 52 y.o. female who is enrolled in [facility]    Visit Vitals    /89 (BP 1 Location: Right arm, BP Patient Position: Sitting)    Pulse 60    Ht 5' 6\" (1.676 m)    Wt 194 lb 9.6 oz (88.3 kg)    BMI 31.41 kg/m2     Weight Metrics 6/23/2017 6/1/2017 6/1/2017 5/26/2017 5/19/2017 5/12/2017 5/5/2017   Weight 194 lb 9.6 oz - 189 lb 6.4 oz 193 lb 187 lb 185 lb 3.2 oz 184 lb 12.8 oz   Waist Measure Inches 30 29 - 32 31 30 29   Exercise Mins/week - 0 - - - - -   Body Fat % - 36.3 - - - - -   BMI 31.41 kg/m2 - 30.57 kg/m2 31.15 kg/m2 30.18 kg/m2 29.89 kg/m2 29.83 kg/m2         Have you received any other medical care this week? no  If yes, where and for what? Have you had any change in your medications since your last visit? no  If yes what? Did you have any problems adhering to the program last week? no  If yes, please explain:       Eating Habits Over Last Week:  Did you take in 64 oz of non-caloric fluids?  yes     Did you consume your 4 meal replacements each day? no       Physical Activity Over the Past Week:    Aerobic exercise: 0 min  Resistance exercise: 0 workouts / week

## 2017-07-14 ENCOUNTER — HOSPITAL ENCOUNTER (OUTPATIENT)
Dept: LAB | Age: 47
Discharge: HOME | End: 2017-07-14
Payer: COMMERCIAL

## 2017-07-14 ENCOUNTER — CLINICAL SUPPORT (OUTPATIENT)
Dept: FAMILY MEDICINE CLINIC | Age: 47
End: 2017-07-14

## 2017-07-14 VITALS
WEIGHT: 206.4 LBS | HEART RATE: 57 BPM | SYSTOLIC BLOOD PRESSURE: 134 MMHG | BODY MASS INDEX: 33.17 KG/M2 | HEIGHT: 66 IN | DIASTOLIC BLOOD PRESSURE: 90 MMHG

## 2017-07-14 DIAGNOSIS — E66.9 OBESITY (BMI 30-39.9): Primary | ICD-10-CM

## 2017-07-14 DIAGNOSIS — E66.9 OBESITY (BMI 30-39.9): ICD-10-CM

## 2017-07-14 LAB
ALBUMIN SERPL BCP-MCNC: 3.6 G/DL (ref 3.4–5)
ALBUMIN/GLOB SERPL: 0.9 {RATIO} (ref 0.8–1.7)
ALP SERPL-CCNC: 80 U/L (ref 45–117)
ALT SERPL-CCNC: 35 U/L (ref 13–56)
ANION GAP BLD CALC-SCNC: 7 MMOL/L (ref 3–18)
AST SERPL W P-5'-P-CCNC: 34 U/L (ref 15–37)
BILIRUB SERPL-MCNC: 0.6 MG/DL (ref 0.2–1)
BUN SERPL-MCNC: 9 MG/DL (ref 7–18)
BUN/CREAT SERPL: 12 (ref 12–20)
CALCIUM SERPL-MCNC: 9.1 MG/DL (ref 8.5–10.1)
CHLORIDE SERPL-SCNC: 104 MMOL/L (ref 100–108)
CO2 SERPL-SCNC: 31 MMOL/L (ref 21–32)
CREAT SERPL-MCNC: 0.78 MG/DL (ref 0.6–1.3)
GLOBULIN SER CALC-MCNC: 4 G/DL (ref 2–4)
GLUCOSE SERPL-MCNC: 76 MG/DL (ref 74–99)
POTASSIUM SERPL-SCNC: 3.8 MMOL/L (ref 3.5–5.5)
PROT SERPL-MCNC: 7.6 G/DL (ref 6.4–8.2)
SODIUM SERPL-SCNC: 142 MMOL/L (ref 136–145)
URATE SERPL-MCNC: 3.6 MG/DL (ref 2.6–7.2)

## 2017-07-14 PROCEDURE — 36415 COLL VENOUS BLD VENIPUNCTURE: CPT | Performed by: NURSE PRACTITIONER

## 2017-07-14 PROCEDURE — 80053 COMPREHEN METABOLIC PANEL: CPT | Performed by: NURSE PRACTITIONER

## 2017-07-14 PROCEDURE — 84550 ASSAY OF BLOOD/URIC ACID: CPT | Performed by: NURSE PRACTITIONER

## 2017-07-14 NOTE — PROGRESS NOTES
Progress Note: Weekly Education Class in the [facility] Program   Is there anything that you or the patient needs to let the [address] [facility] Physician know about? no  Over the past week, have you experienced any side-effects? no    Sharion Callas is a 52 y.o. female who is enrolled in [facility]    Visit Vitals    /90 (BP 1 Location: Right arm, BP Patient Position: Sitting)    Pulse (!) 57    Ht 5' 6\" (1.676 m)    Wt 206 lb 6.4 oz (93.6 kg)    BMI 33.31 kg/m2     Weight Metrics 7/14/2017 6/23/2017 6/1/2017 6/1/2017 5/26/2017 5/19/2017 5/12/2017   Weight 206 lb 6.4 oz 194 lb 9.6 oz - 189 lb 6.4 oz 193 lb 187 lb 185 lb 3.2 oz   Waist Measure Inches 33 30 29 - 32 31 30   Exercise Mins/week - - 0 - - - -   Body Fat % - - 36.3 - - - -   BMI 33.31 kg/m2 31.41 kg/m2 - 30.57 kg/m2 31.15 kg/m2 30.18 kg/m2 29.89 kg/m2         Have you received any other medical care this week? no  If yes, where and for what? Have you had any change in your medications since your last visit? no  If yes what? Did you have any problems adhering to the program last week? no  If yes, please explain:       Eating Habits Over Last Week:  Did you take in 64 oz of non-caloric fluids?  no     Did you consume your 4 meal replacements each day? no       Physical Activity Over the Past Week:    Aerobic exercise: 0 min  Resistance exercise: 0 workouts / week

## 2017-07-21 ENCOUNTER — CLINICAL SUPPORT (OUTPATIENT)
Dept: FAMILY MEDICINE CLINIC | Age: 47
End: 2017-07-21

## 2017-07-21 VITALS
DIASTOLIC BLOOD PRESSURE: 91 MMHG | RESPIRATION RATE: 14 BRPM | HEART RATE: 67 BPM | BODY MASS INDEX: 32.85 KG/M2 | WEIGHT: 204.4 LBS | SYSTOLIC BLOOD PRESSURE: 142 MMHG | HEIGHT: 66 IN

## 2017-07-21 DIAGNOSIS — E66.9 OBESITY (BMI 30-39.9): Primary | ICD-10-CM

## 2017-07-21 PROBLEM — E66.3 OVERWEIGHT (BMI 25.0-29.9): Status: RESOLVED | Noted: 2017-04-03 | Resolved: 2017-07-21

## 2017-07-21 NOTE — PROGRESS NOTES
Progress Note: Weekly Education Class in the [facility]   Is there anything that you or the patient needs to let the [address] [facility] Physician know about? no  Over the past week, have you experienced any side-effects? no    Liset Mazariegos is a 52 y.o. female who is enrolled in [facility]    Visit Vitals    BP (!) 142/91 (BP 1 Location: Right arm, BP Patient Position: Sitting)  Comment: states she feels fine    Pulse 67    Resp 14    Ht 5' 6\" (1.676 m)    Wt 204 lb 6.4 oz (92.7 kg)    BMI 32.99 kg/m2     Weight Metrics 7/21/2017 7/14/2017 7/14/2017 6/23/2017 6/1/2017 6/1/2017 5/26/2017   Weight 204 lb 6.4 oz - 206 lb 6.4 oz 194 lb 9.6 oz - 189 lb 6.4 oz 193 lb   Waist Measure Inches 33.5 33 - 30 29 - 32   Exercise Mins/week - - - - 0 - -   Body Fat % - - - - 36.3 - -   BMI 32.99 kg/m2 - 33.31 kg/m2 31.41 kg/m2 - 30.57 kg/m2 31.15 kg/m2         Have you received any other medical care this week? no  If yes, where and for what? Have you had any change in your medications since your last visit? no  If yes what? Did you have any problems adhering to the program last week? no  If yes, please explain:       Eating Habits Over Last Week:  Did you take in 64 oz of non-caloric fluids? no     Did you consume your 4 meal replacements each day? no patient is still eating off the program, but did not list outside foods. Patient is aware to list all outside foods and drinks but has yet to do so on Homework Sheet.       Physical Activity Over the Past Week:    Aerobic exercise: 0 min  Resistance exercise: 0 workouts / week

## 2017-07-27 ENCOUNTER — OFFICE VISIT (OUTPATIENT)
Dept: INTERNAL MEDICINE CLINIC | Age: 47
End: 2017-07-27

## 2017-07-27 VITALS
DIASTOLIC BLOOD PRESSURE: 91 MMHG | BODY MASS INDEX: 32.8 KG/M2 | SYSTOLIC BLOOD PRESSURE: 131 MMHG | HEART RATE: 74 BPM | RESPIRATION RATE: 18 BRPM | OXYGEN SATURATION: 99 % | TEMPERATURE: 96.7 F | HEIGHT: 66 IN | WEIGHT: 204.1 LBS

## 2017-07-27 DIAGNOSIS — E66.9 OBESITY (BMI 30-39.9): Primary | ICD-10-CM

## 2017-07-27 DIAGNOSIS — I10 ESSENTIAL HYPERTENSION WITH GOAL BLOOD PRESSURE LESS THAN 140/90: ICD-10-CM

## 2017-07-27 NOTE — Clinical Note
Pt wants to restart her snapshot numbers for today as starting weight. I told her I'd ask but not sure if you could with the data collection you do.

## 2017-07-27 NOTE — MR AVS SNAPSHOT
Visit Information Date & Time Provider Department Dept. Phone Encounter #  
 7/27/2017 10:00 AM Darcie Gomez NP Internist of [facility] [id number] Follow-up Instructions Return in about 4 weeks (around 8/24/2017). Your Appointments 8/31/2017  7:38 AM  
METABOLIC PROGRAM 30 with Darcie Gomez NP Internist of [facility] ([address] [facility]) Appt Note: mwl  
 [address] [facility], [address] [place]  
  
   
 [address], [address] Upcoming Health Maintenance Date Due DTaP/Tdap/Td series (1 - Tdap) 1/5/1991 INFLUENZA AGE 9 TO ADULT 8/1/2017 PAP AKA CERVICAL CYTOLOGY 12/6/2019 Allergies as of 7/27/2017  Review Complete On: 7/27/2017 By: Darcie Gomez NP Severity Noted Reaction Type Reactions Percocet [Oxycodone-acetaminophen]  04/07/2015    Itching Current Immunizations  Never Reviewed No immunizations on file. Not reviewed this visit You Were Diagnosed With   
  
 Codes Comments Obesity (BMI 30-39.9)    -  Primary ICD-10-CM: R95.5 ICD-9-CM: 278.00 Essential hypertension with goal blood pressure less than 140/90     ICD-10-CM: I10 
ICD-9-CM: 401.9 Vitals BP Pulse Temp Resp Height(growth percentile) Weight(growth percentile) (!) 131/91 (BP 1 Location: Left arm, BP Patient Position: Sitting) 74 96.7 °F (35.9 °C) (Oral) 18 5' 6\" (1.676 m) 204 lb 1.6 oz (92.6 kg) SpO2 BMI OB Status Smoking Status 99% 32.94 kg/m2 Having regular periods Former Smoker Vitals History BMI and BSA Data Body Mass Index Body Surface Area 32.94 kg/m 2 2.08 m 2 Your Updated Medication List  
  
   
This list is accurate as of: 7/27/17 10:43 AM.  Always use your most recent med list.  
  
  
  
  
 biotin 2,500 mcg Tab Take 2,500 mcg by mouth daily. CALCIUM COMPLETE PO Take  by mouth daily. dietary supplement Cap Take 1,000 mg by mouth daily. CLA Over The Counter Medication taken two tablets in AM and one tablet PM  
  
 garlic 7,855 mg Cap Take 1,000 mg by mouth daily. hydroCHLOROthiazide 25 mg tablet Commonly known as:  HYDRODIURIL Take 25 mg by mouth daily. Indications: HYPERTENSION  
  
 iron,carbonyl-vitamin C 65 mg iron- 125 mg Tbec Take  mg by mouth daily. losartan 25 mg tablet Commonly known as:  COZAAR Take 25 mg by mouth daily. MEGARED OMEGA-3 KRILL -62-56-50 mg Cap Generic drug:  krill-om-3-dha-epa-phospho-ast  
Take 300 mg by mouth daily. ONE DAILY COMPLETE PO Take  by mouth daily. VITAMIN D3 2,000 unit Tab Generic drug:  cholecalciferol (vitamin D3) Take 2,000 Units by mouth daily. Follow-up Instructions Return in about 4 weeks (around 8/24/2017). To-Do List   
 07/27/2017 Lab:  METABOLIC PANEL, COMPREHENSIVE   
  
 07/27/2017 Lab:  URIC ACID Patient Instructions Health Maintenance Due Topic Date Due  
 DTaP/Tdap/Td series (1 - Tdap) 01/05/1991 Restart weight: 
204lbs Restart date: 
7/27/2017 If you know you are at risk for coming off the program, set up with dietician. Monthly Goal: 
 
10 lbs weight loss Body Mass Index: Care Instructions Your Care Instructions Body mass index (BMI) can help you see if your weight is raising your risk for health problems. It uses a formula to compare how much you weigh with how tall you are. · A BMI lower than 18.5 is considered underweight. · A BMI between 18.5 and 24.9 is considered healthy. · A BMI between 25 and 29.9 is considered overweight. A BMI of 30 or higher is considered obese. If your BMI is in the normal range, it means that you have a lower risk for weight-related health problems.  If your BMI is in the overweight or obese range, you may be at increased risk for weight-related health problems, such as high blood pressure, heart disease, stroke, arthritis or joint pain, and diabetes. If your BMI is in the underweight range, you may be at increased risk for health problems such as fatigue, lower protection (immunity) against illness, muscle loss, bone loss, hair loss, and hormone problems. BMI is just one measure of your risk for weight-related health problems. You may be at higher risk for health problems if you are not active, you eat an unhealthy diet, or you drink too much alcohol or use tobacco products. Follow-up care is a key part of your treatment and safety. Be sure to make and go to all appointments, and call your doctor if you are having problems. It's also a good idea to know your test results and keep a list of the medicines you take. How can you care for yourself at home? · Practice healthy eating habits. This includes eating plenty of fruits, vegetables, whole grains, lean protein, and low-fat dairy. · If your doctor recommends it, get more exercise. Walking is a good choice. Bit by bit, increase the amount you walk every day. Try for at least 30 minutes on most days of the week. · Do not smoke. Smoking can increase your risk for health problems. If you need help quitting, talk to your doctor about stop-smoking programs and medicines. These can increase your chances of quitting for good. · Limit alcohol to 2 drinks a day for men and 1 drink a day for women. Too much alcohol can cause health problems. If you have a BMI higher than 25 · Your doctor may do other tests to check your risk for weight-related health problems. This may include measuring the distance around your waist. A waist measurement of more than 40 inches in men or 35 inches in women can increase the risk of weight-related health problems. · Talk with your doctor about steps you can take to stay healthy or improve your health.  You may need to make lifestyle changes to lose weight and stay healthy, such as changing your diet and getting regular exercise. If you have a BMI lower than 18.5 · Your doctor may do other tests to check your risk for health problems. · Talk with your doctor about steps you can take to stay healthy or improve your health. You may need to make lifestyle changes to gain or maintain weight and stay healthy, such as getting more healthy foods in your diet and doing exercises to build muscle. Where can you learn more? Go to http://kathy-shamar.info/. Enter S176 in the search box to learn more about \"Body Mass Index: Care Instructions. \" Current as of: January 23, 2017 Content Version: 11.3 © 6494-9224 [employer]. Care instructions adapted under license by [employer] (which disclaims liability or warranty for this information). If you have questions about a medical condition or this instruction, always ask your healthcare professional. [address] any warranty or liability for your use of this information. Introducing [facility] HE[facility]! [facility] introduces K[employer] patient portal. Now you can access parts of your medical record, email your doctor's office, and request medication refills online. 1. In your internet browser, go to [email or web address] 2. Click on the First Time User? Click Here link in the Sign In box. You will see the New Member Sign Up page. 3. Enter your [employer] Access Code exactly as it appears below. You will not need to use this code after youve completed the sign-up process. If you do not sign up before the expiration date, you must request a new code. · K[employer] Access Code: [email or web address] Expires: 10/12/2017 10:29 AM 
 
4. Enter the last four digits of your Social Security Number (xxxx) and Date of Birth (mm/dd/yyyy) as indicated and click Submit. You will be taken to the next sign-up page. 5. Create a "[employer] ID. This will be your "[employer] login ID and cannot be changed, so think of one that is secure and easy to remember. 6. Create a "[employer] password. You can change your password at any time. 7. Enter your Password Reset Question and Answer. This can be used at a later time if you forget your password. 8. Enter your e-mail address. You will receive e-mail notification when new information is available in [address] 9. Click Sign Up. You can now view and download portions of your medical record. 10. Click the Download Summary menu link to download a portable copy of your medical information. If you have questions, please visit the Frequently Asked Questions section of the "[employer] website. Remember, "[employer] is NOT to be used for urgent needs. For medical emergencies, dial 911. Now available from your iPhone and Android! Please provide this summary of care documentation to your next provider. Your primary care clinician is listed as Triston Vinson. If you have any questions after today's visit, please call [phone].

## 2017-07-27 NOTE — PATIENT INSTRUCTIONS
Health Maintenance Due   Topic Date Due    DTaP/Tdap/Td series (1 - Tdap) 01/05/1991     Restart weight:  204lbs    Restart date:  7/27/2017    If you know you are at risk for coming off the program, set up with dietician. Monthly Goal:    10 lbs weight loss         Body Mass Index: Care Instructions  Your Care Instructions    Body mass index (BMI) can help you see if your weight is raising your risk for health problems. It uses a formula to compare how much you weigh with how tall you are. · A BMI lower than 18.5 is considered underweight. · A BMI between 18.5 and 24.9 is considered healthy. · A BMI between 25 and 29.9 is considered overweight. A BMI of 30 or higher is considered obese. If your BMI is in the normal range, it means that you have a lower risk for weight-related health problems. If your BMI is in the overweight or obese range, you may be at increased risk for weight-related health problems, such as high blood pressure, heart disease, stroke, arthritis or joint pain, and diabetes. If your BMI is in the underweight range, you may be at increased risk for health problems such as fatigue, lower protection (immunity) against illness, muscle loss, bone loss, hair loss, and hormone problems. BMI is just one measure of your risk for weight-related health problems. You may be at higher risk for health problems if you are not active, you eat an unhealthy diet, or you drink too much alcohol or use tobacco products. Follow-up care is a key part of your treatment and safety. Be sure to make and go to all appointments, and call your doctor if you are having problems. It's also a good idea to know your test results and keep a list of the medicines you take. How can you care for yourself at home? · Practice healthy eating habits. This includes eating plenty of fruits, vegetables, whole grains, lean protein, and low-fat dairy. · If your doctor recommends it, get more exercise. Walking is a good choice. Bit by bit, increase the amount you walk every day. Try for at least 30 minutes on most days of the week. · Do not smoke. Smoking can increase your risk for health problems. If you need help quitting, talk to your doctor about stop-smoking programs and medicines. These can increase your chances of quitting for good. · Limit alcohol to 2 drinks a day for men and 1 drink a day for women. Too much alcohol can cause health problems. If you have a BMI higher than 25  · Your doctor may do other tests to check your risk for weight-related health problems. This may include measuring the distance around your waist. A waist measurement of more than 40 inches in men or 35 inches in women can increase the risk of weight-related health problems. · Talk with your doctor about steps you can take to stay healthy or improve your health. You may need to make lifestyle changes to lose weight and stay healthy, such as changing your diet and getting regular exercise. If you have a BMI lower than 18.5  · Your doctor may do other tests to check your risk for health problems. · Talk with your doctor about steps you can take to stay healthy or improve your health. You may need to make lifestyle changes to gain or maintain weight and stay healthy, such as getting more healthy foods in your diet and doing exercises to build muscle. Where can you learn more? Go to http://kathy-shamar.info/. Enter S176 in the search box to learn more about \"Body Mass Index: Care Instructions. \"  Current as of: January 23, 2017  Content Version: 11.3  © 6262-0371 [employer]. Care instructions adapted under license by [employer] (which disclaims liability or warranty for this information). If you have questions about a medical condition or this instruction, always ask your healthcare professional. [place] [address] any warranty or liability for your use of this information.

## 2017-07-27 NOTE — PROGRESS NOTES
[facility] Progress Note:   F/up Physician Visit    CC: Obesity      Aurora Cruz is a 52 y.o. female who is here for her  f/up physician visit for the VLCD Program. Brion Hashimoto has completed 59 weeks of the program to date. 15 lb weight gain since last visit. Her  was home (he travels and is home aprox 1 mo every year) states she started well and then just ate everything. Long discussion with patient about being on program 59 weeks. Discussed options of maintenance, programs with [employer] and therapy. Pt would like to restart program with consideration to the options, especially when she knows she will be facing bigger challenges. States she has lost 5 lbs since  left and wants to try to get back on reduction. She is discouraged by the numbers on her snapshot and is requesting a \"restart\" on those numbers. Starting weight: 206 lbs  Current weight: 204 lbs     Goal weight: 150 lbs      Most recent [id number] at 206 lbs    Weight Metrics 2017   Weight - 204 lb 1.6 oz 204 lb 6.4 oz - 206 lb 6.4 oz 194 lb 9.6 oz -   Waist Measure Inches 33 - 33.5 33 - 30 29   Exercise Mins/week 0 - - - - - 0   Body Fat % 38.7 - - - - - 36.3   BMI - 32.94 kg/m2 32.99 kg/m2 - 33.31 kg/m2 31.41 kg/m2 -         Current Outpatient Prescriptions   Medication Sig Dispense Refill    CALCIUM CARB/MAG OXIDE/C/BETA (CALCIUM COMPLETE PO) Take  by mouth daily.  cholecalciferol, vitamin D3, (VITAMIN D3) 2,000 unit tab Take 2,000 Units by mouth daily.  dietary supplement cap Take 1,000 mg by mouth daily. CLA Over The Counter Medication taken two tablets in AM and one tablet PM      krill-om-3-dha-epa-phospho-ast (MEGARED OMEGA-3 KRILL OIL) 929-29-52-50 mg cap Take 300 mg by mouth daily.  garlic 8,738 mg cap Take 1,000 mg by mouth daily.  losartan (COZAAR) 25 mg tablet Take 25 mg by mouth daily.       hydrochlorothiazide (HYDRODIURIL) 25 mg tablet Take 25 mg by mouth daily. Indications: HYPERTENSION  2    iron-vitamin C 65 mg iron- 125 mg TbEC Take  mg by mouth daily.  biotin 2,500 mcg Tab Take 2,500 mcg by mouth daily.  MULTIVITAMIN WITH MINERALS (ONE DAILY COMPLETE PO) Take  by mouth daily. Participation   Did you attend clinic and class last week? yes    Review of Systems  Since your last visit, have you experienced any complications? no  If yes, please list:     No CP, SOB, palpitations, lightheadedness, dizziness, constipation. Positives highlight in BOLD. Are you taking an appetite suppressant? no  If so, is there any Chest Pain, Palpitations or Dizziness? BP Readings from Last 10 Encounters:   07/27/17 (!) 131/91   07/21/17 (!) 142/91   07/14/17 134/90   06/23/17 144/89   06/01/17 119/85   05/26/17 (!) 143/91   05/19/17 (!) 135/97   05/12/17 127/87   05/05/17 130/88   04/28/17 128/90         Have you received any other medical care this week? no  If yes, where and for what? Have you discontinued or changed any medicine or dose of your medicine since your last visit? no  If yes, where and for what? Diet  How many ounces of calorie-free liquids did you consume each day? 16-74 oz    How many meal replacements did you take each day? 3-4    Did you have any problems adhering to the program?  yes If yes, please explain: sometime      Exercise  Aerobic exercise: 0 min  Resistance exercise: 0 workouts / week  Any discomfort?  no     If yes, where? Review of Systems  Complete ROS negative except where noted above    Objective  Visit Vitals    BP (!) 131/91 (BP 1 Location: Left arm, BP Patient Position: Sitting)    Pulse 74    Temp 96.7 °F (35.9 °C) (Oral)    Resp 18    Ht 5' 6\" (1.676 m)    Wt 204 lb 1.6 oz (92.6 kg)    SpO2 99%    BMI 32.94 kg/m2     No LMP recorded.     Waist Circumference: I personally reviewed patient's Weight Management Doc Flowsheet  Neck Circumference: I personally reviewed patient's Weight Management Doc Flowsheet  Percent Body Fat: I personally reviewed patient's Weight Management Doc Flowsheet    Physical Exam  Appearance: well appearing, obese, A&O, NAD  HEENT:  NC/AT, PERRL, No scleral icterus  Heart:  RRR without M/R/G  Lungs:  CTAB, no rhonchi, rales, or wheezes with good air exchange   Ext:  No LE Edema    Assessment / Plan    Encounter Diagnoses   Name Primary?  Obesity (BMI 30-39. 9) Yes    Essential hypertension with goal blood pressure less than 140/90        1. Weight management poorly controlled   Progress was reviewed with patient    2. Labs    Latest results reviewed with patient   Lab slip given to pt for f/up HDL labs    3. Diet regimen   # of meal replacements prescribed: 4   If modified LCD-nutritional guidelines:    Monthly Goal   As below    Medical monitoring schedule:   Weekly BP/Weight checks   Monthly provider appointments          Patient Instructions     Health Maintenance Due   Topic Date Due    DTaP/Tdap/Td series (1 - Tdap) 01/05/1991     Restart weight:  204lbs    Restart date:  7/27/2017    If you know you are at risk for coming off the program, set up with dietician. Monthly Goal:    10 lbs weight loss         Body Mass Index: Care Instructions  Your Care Instructions    Body mass index (BMI) can help you see if your weight is raising your risk for health problems. It uses a formula to compare how much you weigh with how tall you are. · A BMI lower than 18.5 is considered underweight. · A BMI between 18.5 and 24.9 is considered healthy. · A BMI between 25 and 29.9 is considered overweight. A BMI of 30 or higher is considered obese. If your BMI is in the normal range, it means that you have a lower risk for weight-related health problems.  If your BMI is in the overweight or obese range, you may be at increased risk for weight-related health problems, such as high blood pressure, heart disease, stroke, arthritis or joint pain, and diabetes. If your BMI is in the underweight range, you may be at increased risk for health problems such as fatigue, lower protection (immunity) against illness, muscle loss, bone loss, hair loss, and hormone problems. BMI is just one measure of your risk for weight-related health problems. You may be at higher risk for health problems if you are not active, you eat an unhealthy diet, or you drink too much alcohol or use tobacco products. Follow-up care is a key part of your treatment and safety. Be sure to make and go to all appointments, and call your doctor if you are having problems. It's also a good idea to know your test results and keep a list of the medicines you take. How can you care for yourself at home? · Practice healthy eating habits. This includes eating plenty of fruits, vegetables, whole grains, lean protein, and low-fat dairy. · If your doctor recommends it, get more exercise. Walking is a good choice. Bit by bit, increase the amount you walk every day. Try for at least 30 minutes on most days of the week. · Do not smoke. Smoking can increase your risk for health problems. If you need help quitting, talk to your doctor about stop-smoking programs and medicines. These can increase your chances of quitting for good. · Limit alcohol to 2 drinks a day for men and 1 drink a day for women. Too much alcohol can cause health problems. If you have a BMI higher than 25  · Your doctor may do other tests to check your risk for weight-related health problems. This may include measuring the distance around your waist. A waist measurement of more than 40 inches in men or 35 inches in women can increase the risk of weight-related health problems. · Talk with your doctor about steps you can take to stay healthy or improve your health. You may need to make lifestyle changes to lose weight and stay healthy, such as changing your diet and getting regular exercise.   If you have a BMI lower than 18.5  · Your doctor may do other tests to check your risk for health problems. · Talk with your doctor about steps you can take to stay healthy or improve your health. You may need to make lifestyle changes to gain or maintain weight and stay healthy, such as getting more healthy foods in your diet and doing exercises to build muscle. Where can you learn more? Go to http://kathy-shamar.info/. Enter S176 in the search box to learn more about \"Body Mass Index: Care Instructions. \"  Current as of: January 23, 2017  Content Version: 11.3  © 6269-0802 "[employer] Care instructions adapted under license by [employer] (which disclaims liability or warranty for this information). If you have questions about a medical condition or this instruction, always ask your healthcare professional. [facility] any warranty or liability for your use of this information. Follow-up Disposition:  Return in about 4 weeks (around 8/24/2017). 10 minutes of the 15 minutes face to face time with Laura Causey consisted of counseling & coordinating and/or discussing treatment plans in reference to her The primary encounter diagnosis was Obesity (BMI 30-39.9). A diagnosis of Essential hypertension with goal blood pressure less than 140/90 was also pertinent to this visit. The patient is to follow up as scheduled and will report to the ED or the office if symptoms change or increase. The patient has voiced understanding and will comply.

## 2017-07-27 NOTE — PROGRESS NOTES
Chief Complaint   Patient presents with    Weight Management     1. Have you been to the ER, urgent care clinic since your last visit? Hospitalized since your last visit? No    2. Have you seen or consulted any other health care providers outside of the [address] [place] [address] since your last visit? Include any pap smears or colon screening.  No

## 2017-08-11 ENCOUNTER — CLINICAL SUPPORT (OUTPATIENT)
Dept: FAMILY MEDICINE CLINIC | Age: 47
End: 2017-08-11

## 2017-08-11 VITALS
HEIGHT: 66 IN | HEART RATE: 64 BPM | SYSTOLIC BLOOD PRESSURE: 129 MMHG | WEIGHT: 199.8 LBS | BODY MASS INDEX: 32.11 KG/M2 | DIASTOLIC BLOOD PRESSURE: 87 MMHG

## 2017-08-11 DIAGNOSIS — E66.9 OBESITY (BMI 30-39.9): Primary | ICD-10-CM

## 2017-08-11 NOTE — PROGRESS NOTES
Progress Note: Weekly Education Class in the [facility]   Is there anything that you or the patient needs to let the [address] [facility] Physician know about? no  Over the past week, have you experienced any side-effects? no    Zoila Boateng is a 52 y.o. female who is enrolled in [facility]    Visit Vitals    /87 (BP 1 Location: Right arm, BP Patient Position: Sitting)    Pulse 64    Ht 5' 6\" (1.676 m)    Wt 199 lb 12.8 oz (90.6 kg)    BMI 32.25 kg/m2     Weight Metrics 8/11/2017 7/27/2017 7/27/2017 7/21/2017 7/14/2017 7/14/2017 6/23/2017   Weight 199 lb 12.8 oz - 204 lb 1.6 oz 204 lb 6.4 oz - 206 lb 6.4 oz 194 lb 9.6 oz   Waist Measure Inches 33 33 - 33.5 33 - 30   Exercise Mins/week - 0 - - - - -   Body Fat % - 38.7 - - - - -   BMI 32.25 kg/m2 - 32.94 kg/m2 32.99 kg/m2 - 33.31 kg/m2 31.41 kg/m2         Have you received any other medical care this week? no  If yes, where and for what? Have you had any change in your medications since your last visit? no  If yes what? Did you have any problems adhering to the program last week? no  If yes, please explain:       Eating Habits Over Last Week:  Did you take in 64 oz of non-caloric fluids? yes     Did you consume your 4 meal replacements each day?  yes       Physical Activity Over the Past Week:    Aerobic exercise: 30 min  Resistance exercise: 30 min workouts / week

## 2017-08-18 ENCOUNTER — CLINICAL SUPPORT (OUTPATIENT)
Dept: FAMILY MEDICINE CLINIC | Age: 47
End: 2017-08-18

## 2017-08-18 VITALS
SYSTOLIC BLOOD PRESSURE: 113 MMHG | WEIGHT: 195.4 LBS | HEIGHT: 66 IN | DIASTOLIC BLOOD PRESSURE: 75 MMHG | BODY MASS INDEX: 31.4 KG/M2 | HEART RATE: 73 BPM

## 2017-08-18 DIAGNOSIS — E66.9 OBESITY (BMI 30-39.9): Primary | ICD-10-CM

## 2017-08-18 NOTE — PROGRESS NOTES
Progress Note: Weekly Education Class in the [facility]   Is there anything that you or the patient needs to let the [address] [facility] Physician know about? no  Over the past week, have you experienced any side-effects? no    Shea Frey is a 52 y.o. female who is enrolled in [facility]    Visit Vitals    /75 (BP 1 Location: Left arm, BP Patient Position: Sitting)    Pulse 73    Ht 5' 6\" (1.676 m)    Wt 195 lb 6.4 oz (88.6 kg)    BMI 31.54 kg/m2     Weight Metrics 8/18/2017 8/11/2017 7/27/2017 7/27/2017 7/21/2017 7/14/2017 7/14/2017   Weight 195 lb 6.4 oz 199 lb 12.8 oz - 204 lb 1.6 oz 204 lb 6.4 oz - 206 lb 6.4 oz   Waist Measure Inches 31 33 33 - 33.5 33 -   Exercise Mins/week - - 0 - - - -   Body Fat % - - 38.7 - - - -   BMI 31.54 kg/m2 32.25 kg/m2 - 32.94 kg/m2 32.99 kg/m2 - 33.31 kg/m2         Have you received any other medical care this week? no  If yes, where and for what? Have you had any change in your medications since your last visit? no  If yes what? Did you have any problems adhering to the program last week? no  If yes, please explain:       Eating Habits Over Last Week:  Did you take in 64 oz of non-caloric fluids? no     Did you consume your 4 meal replacements each day?  yes       Physical Activity Over the Past Week:    Aerobic exercise: 230 min  Resistance exercise: 105 min workouts / week

## 2017-08-18 NOTE — PROGRESS NOTES
Progress Note: Weekly Education Class in the [facility]   Is there anything that you or the patient needs to let the [address] [facility] Physician know about? no  Over the past week, have you experienced any side-effects? no    Bina Alvarez is a 52 y.o. female who is enrolled in [facility]    There were no vitals taken for this visit. Weight Metrics 8/18/2017 8/11/2017 7/27/2017 7/27/2017 7/21/2017 7/14/2017 7/14/2017   Weight - 199 lb 12.8 oz - 204 lb 1.6 oz 204 lb 6.4 oz - 206 lb 6.4 oz   Waist Measure Inches 31 33 33 - 33.5 33 -   Exercise Mins/week - - 0 - - - -   Body Fat % - - 38.7 - - - -   BMI - 32.25 kg/m2 - 32.94 kg/m2 32.99 kg/m2 - 33.31 kg/m2         Have you received any other medical care this week? no  If yes, where and for what? Have you had any change in your medications since your last visit? no  If yes what? Did you have any problems adhering to the program last week? no  If yes, please explain:       Eating Habits Over Last Week:  Did you take in 64 oz of non-caloric fluids? no     Did you consume your 4 meal replacements each day?  yes       Physical Activity Over the Past Week:    Aerobic exercise: 230 min  Resistance exercise: 105 min workouts / week

## 2017-08-25 ENCOUNTER — CLINICAL SUPPORT (OUTPATIENT)
Dept: FAMILY MEDICINE CLINIC | Age: 47
End: 2017-08-25

## 2017-08-25 VITALS
SYSTOLIC BLOOD PRESSURE: 136 MMHG | RESPIRATION RATE: 18 BRPM | HEART RATE: 61 BPM | WEIGHT: 203.6 LBS | DIASTOLIC BLOOD PRESSURE: 94 MMHG | BODY MASS INDEX: 32.72 KG/M2 | HEIGHT: 66 IN

## 2017-08-25 DIAGNOSIS — E66.9 OBESITY (BMI 30-39.9): Primary | ICD-10-CM

## 2017-09-08 ENCOUNTER — CLINICAL SUPPORT (OUTPATIENT)
Dept: FAMILY MEDICINE CLINIC | Age: 47
End: 2017-09-08

## 2017-09-08 VITALS
DIASTOLIC BLOOD PRESSURE: 103 MMHG | HEART RATE: 55 BPM | HEIGHT: 66 IN | SYSTOLIC BLOOD PRESSURE: 153 MMHG | BODY MASS INDEX: 33.01 KG/M2 | WEIGHT: 205.4 LBS

## 2017-09-08 DIAGNOSIS — E66.9 OBESITY (BMI 30-39.9): Primary | ICD-10-CM

## 2017-09-08 NOTE — PROGRESS NOTES
Progress Note: Weekly Education Class in the [facility]   Is there anything that you or the patient needs to let the [facility] Physician know about? Yes the patient states she has had some headaches these past few days. Her weight is up from 175 lbs, to now 205.4 lbs. With her blood pressure history she tends to have elevated blood pressure readings when her weight is 200 or over. She is going to keep a blood pressure log at home and turn that in next week when I see her again for Triage. She will also inform her PCP about the elevated blood pressure here at the Weight Loss Class. The patient also is under some Family Stress, due to daughter living in [place] and going through [employer] problems. Over the past week, have you experienced any side-effects? no    Sung Myrick is a 52 y.o. female who is enrolled in [facility]    Visit Vitals    BP (!) 153/103 (BP 1 Location: Right arm, BP Patient Position: Sitting)  Comment: weight increase and patient is under stress    Pulse (!) 55    Ht 5' 6\" (1.676 m)    Wt 205 lb 6.4 oz (93.2 kg)    BMI 33.15 kg/m2     Weight Metrics 9/8/2017 8/25/2017 8/18/2017 8/11/2017 7/27/2017 7/27/2017 7/21/2017   Weight 205 lb 6.4 oz 203 lb 9.6 oz 195 lb 6.4 oz 199 lb 12.8 oz - 204 lb 1.6 oz 204 lb 6.4 oz   Waist Measure Inches 33 33 31 33 33 - 33.5   Exercise Mins/week - - - - 0 - -   Body Fat % - - - - 38.7 - -   BMI 33.15 kg/m2 32.86 kg/m2 31.54 kg/m2 32.25 kg/m2 - 32.94 kg/m2 32.99 kg/m2         Have you received any other medical care this week? no  If yes, where and for what? Have you had any change in your medications since your last visit? no  If yes what? Did you have any problems adhering to the program last week? yes  If yes, please explain: had a headache and ate some crackers and drank ginger ale. Eating Habits Over Last Week:  Did you take in 64 oz of non-caloric fluids?  no     Did you consume your 4 meal replacements each day? no       Physical Activity Over the Past Week:    Aerobic exercise: 0 min  Resistance exercise: 0 workouts / week

## 2017-09-22 ENCOUNTER — CLINICAL SUPPORT (OUTPATIENT)
Dept: FAMILY MEDICINE CLINIC | Age: 47
End: 2017-09-22

## 2017-09-22 VITALS
BODY MASS INDEX: 32.4 KG/M2 | WEIGHT: 201.6 LBS | HEART RATE: 69 BPM | SYSTOLIC BLOOD PRESSURE: 130 MMHG | DIASTOLIC BLOOD PRESSURE: 88 MMHG | HEIGHT: 66 IN

## 2017-09-22 DIAGNOSIS — E66.9 OBESITY (BMI 30-39.9): Primary | ICD-10-CM

## 2017-09-22 NOTE — PROGRESS NOTES
Progress Note: Weekly Education Class in the [facility]   Is there anything that you or the patient needs to let the [address] [facility] Physician know about? no  Over the past week, have you experienced any side-effects? no    Sena Hodgkins is a 52 y.o. female who is enrolled in [facility]    Visit Vitals    /88 (BP 1 Location: Right arm, BP Patient Position: Sitting)    Pulse 69    Ht 5' 6\" (1.676 m)    Wt 201 lb 9.6 oz (91.4 kg)    BMI 32.54 kg/m2     Weight Metrics 9/22/2017 9/8/2017 8/25/2017 8/18/2017 8/11/2017 7/27/2017 7/27/2017   Weight 201 lb 9.6 oz 205 lb 6.4 oz 203 lb 9.6 oz 195 lb 6.4 oz 199 lb 12.8 oz - 204 lb 1.6 oz   Waist Measure Inches 32 33 33 31 33 33 -   Exercise Mins/week - - - - - 0 -   Body Fat % - - - - - 38.7 -   BMI 32.54 kg/m2 33.15 kg/m2 32.86 kg/m2 31.54 kg/m2 32.25 kg/m2 - 32.94 kg/m2         Have you received any other medical care this week? no  If yes, where and for what? Have you had any change in your medications since your last visit? no  If yes what? Did you have any problems adhering to the program last week? no  If yes, please explain:       Eating Habits Over Last Week:  Did you take in 64 oz of non-caloric fluids?  no     Did you consume your 4 meal replacements each day? no       Physical Activity Over the Past Week:    Aerobic exercise: 465 min  Resistance exercise: 180 min workouts / week

## 2017-10-06 ENCOUNTER — CLINICAL SUPPORT (OUTPATIENT)
Dept: FAMILY MEDICINE CLINIC | Age: 47
End: 2017-10-06

## 2017-10-06 VITALS
DIASTOLIC BLOOD PRESSURE: 88 MMHG | HEART RATE: 69 BPM | SYSTOLIC BLOOD PRESSURE: 134 MMHG | BODY MASS INDEX: 33.46 KG/M2 | WEIGHT: 208.2 LBS | HEIGHT: 66 IN

## 2017-10-06 DIAGNOSIS — E66.01 MORBID OBESITY (HCC): Primary | ICD-10-CM

## 2017-10-06 NOTE — PROGRESS NOTES
Progress Note: Weekly Education Class in the [facility]   Is there anything that you or the patient needs to let the [address] [facility] Physician know about? no  Over the past week, have you experienced any side-effects? no    Jonny Pettit is a 52 y.o. female who is enrolled in [facility]    Visit Vitals    /88 (BP 1 Location: Right arm, BP Patient Position: Sitting)    Pulse 69    Ht 5' 6\" (1.676 m)    Wt 208 lb 3.2 oz (94.4 kg)    BMI 33.6 kg/m2     Weight Metrics 10/6/2017 9/22/2017 9/8/2017 8/25/2017 8/18/2017 8/11/2017 7/27/2017   Weight 208 lb 3.2 oz 201 lb 9.6 oz 205 lb 6.4 oz 203 lb 9.6 oz 195 lb 6.4 oz 199 lb 12.8 oz -   Waist Measure Inches 34 32 33 33 31 33 33   Exercise Mins/week - - - - - - 0   Body Fat % - - - - - - 38.7   BMI 33.6 kg/m2 32.54 kg/m2 33.15 kg/m2 32.86 kg/m2 31.54 kg/m2 32.25 kg/m2 -         Have you received any other medical care this week? no  If yes, where and for what? Have you had any change in your medications since your last visit? no  If yes what? Did you have any problems adhering to the program last week? no  If yes, please explain:       Eating Habits Over Last Week:  Did you take in 64 oz of non-caloric fluids?  no     Did you consume your 4 meal replacements each day? no       Physical Activity Over the Past Week:    Aerobic exercise: 0 min  Resistance exercise: 0 workouts / week

## 2017-10-13 ENCOUNTER — CLINICAL SUPPORT (OUTPATIENT)
Dept: FAMILY MEDICINE CLINIC | Age: 47
End: 2017-10-13

## 2017-10-13 ENCOUNTER — HOSPITAL ENCOUNTER (OUTPATIENT)
Dept: LAB | Age: 47
Discharge: HOME | End: 2017-10-13
Payer: COMMERCIAL

## 2017-10-13 VITALS
WEIGHT: 203.2 LBS | HEART RATE: 70 BPM | HEIGHT: 66 IN | BODY MASS INDEX: 32.66 KG/M2 | DIASTOLIC BLOOD PRESSURE: 79 MMHG | SYSTOLIC BLOOD PRESSURE: 118 MMHG

## 2017-10-13 DIAGNOSIS — E66.9 OBESITY (BMI 30-39.9): ICD-10-CM

## 2017-10-13 DIAGNOSIS — I10 ESSENTIAL HYPERTENSION WITH GOAL BLOOD PRESSURE LESS THAN 140/90: ICD-10-CM

## 2017-10-13 DIAGNOSIS — E66.9 OBESITY (BMI 30-39.9): Primary | ICD-10-CM

## 2017-10-13 LAB
ALBUMIN SERPL-MCNC: 3.7 G/DL (ref 3.4–5)
ALBUMIN/GLOB SERPL: 1 {RATIO} (ref 0.8–1.7)
ALP SERPL-CCNC: 71 U/L (ref 45–117)
ALT SERPL-CCNC: 25 U/L (ref 13–56)
ANION GAP SERPL CALC-SCNC: 6 MMOL/L (ref 3–18)
AST SERPL-CCNC: 18 U/L (ref 15–37)
BILIRUB SERPL-MCNC: 0.6 MG/DL (ref 0.2–1)
BUN SERPL-MCNC: 8 MG/DL (ref 7–18)
BUN/CREAT SERPL: 10 (ref 12–20)
CALCIUM SERPL-MCNC: 9.6 MG/DL (ref 8.5–10.1)
CHLORIDE SERPL-SCNC: 104 MMOL/L (ref 100–108)
CO2 SERPL-SCNC: 30 MMOL/L (ref 21–32)
CREAT SERPL-MCNC: 0.8 MG/DL (ref 0.6–1.3)
GLOBULIN SER CALC-MCNC: 3.8 G/DL (ref 2–4)
GLUCOSE SERPL-MCNC: 76 MG/DL (ref 74–99)
POTASSIUM SERPL-SCNC: 3.8 MMOL/L (ref 3.5–5.5)
PROT SERPL-MCNC: 7.5 G/DL (ref 6.4–8.2)
SODIUM SERPL-SCNC: 140 MMOL/L (ref 136–145)
URATE SERPL-MCNC: 2.8 MG/DL (ref 2.6–7.2)

## 2017-10-13 PROCEDURE — 84550 ASSAY OF BLOOD/URIC ACID: CPT | Performed by: NURSE PRACTITIONER

## 2017-10-13 PROCEDURE — 80053 COMPREHEN METABOLIC PANEL: CPT | Performed by: NURSE PRACTITIONER

## 2017-10-13 PROCEDURE — 36415 COLL VENOUS BLD VENIPUNCTURE: CPT | Performed by: NURSE PRACTITIONER

## 2017-10-13 NOTE — PROGRESS NOTES
Progress Note: Weekly Education Class in the [facility] Weight Loss Program   Is there anything that you or the patient needs to let the [address] [facility] Physician know about? no  Over the past week, have you experienced any side-effects? no    Greer Coppola is a 52 y.o. female who is enrolled in [facility]    Visit Vitals    /79 (BP 1 Location: Right arm, BP Patient Position: Sitting)    Pulse 70    Ht 5' 6\" (1.676 m)    Wt 203 lb 3.2 oz (92.2 kg)    BMI 32.8 kg/m2     Weight Metrics 10/13/2017 10/6/2017 9/22/2017 9/8/2017 8/25/2017 8/18/2017 8/11/2017   Weight 203 lb 3.2 oz 208 lb 3.2 oz 201 lb 9.6 oz 205 lb 6.4 oz 203 lb 9.6 oz 195 lb 6.4 oz 199 lb 12.8 oz   Waist Measure Inches 32 34 32 33 33 31 33   Exercise Mins/week - - - - - - -   Body Fat % - - - - - - -   BMI 32.8 kg/m2 33.6 kg/m2 32.54 kg/m2 33.15 kg/m2 32.86 kg/m2 31.54 kg/m2 32.25 kg/m2         Have you received any other medical care this week? no  If yes, where and for what? Have you had any change in your medications since your last visit? no  If yes what? Did you have any problems adhering to the program last week? no  If yes, please explain:       Eating Habits Over Last Week:  Did you take in 64 oz of non-caloric fluids?  no     Did you consume your 4 meal replacements each day? no       Physical Activity Over the Past Week:    Aerobic exercise: 0 min  Resistance exercise: 0 workouts / week

## 2017-10-19 ENCOUNTER — OFFICE VISIT (OUTPATIENT)
Dept: INTERNAL MEDICINE CLINIC | Age: 47
End: 2017-10-19

## 2017-10-19 VITALS
OXYGEN SATURATION: 97 % | WEIGHT: 201.7 LBS | RESPIRATION RATE: 18 BRPM | TEMPERATURE: 98.2 F | HEIGHT: 66 IN | DIASTOLIC BLOOD PRESSURE: 92 MMHG | HEART RATE: 73 BPM | SYSTOLIC BLOOD PRESSURE: 130 MMHG | BODY MASS INDEX: 32.42 KG/M2

## 2017-10-19 DIAGNOSIS — E66.9 OBESITY (BMI 30-39.9): Primary | ICD-10-CM

## 2017-10-19 DIAGNOSIS — I10 ESSENTIAL HYPERTENSION WITH GOAL BLOOD PRESSURE LESS THAN 140/90: ICD-10-CM

## 2017-10-19 RX ORDER — NALTREXONE HYDROCHLORIDE AND BUPROPION HYDROCHLORIDE 8; 90 MG/1; MG/1
TABLET, EXTENDED RELEASE ORAL
Refills: 3 | COMMUNITY
Start: 2017-09-21 | End: 2017-10-19 | Stop reason: ALTCHOICE

## 2017-10-19 NOTE — PROGRESS NOTES
Chief Complaint   Patient presents with    Weight Management     1. Have you been to the ER, urgent care clinic since your last visit? Hospitalized since your last visit? No    2. Have you seen or consulted any other health care providers outside of the [address] [place] [address] since your last visit? Include any pap smears or colon screening.  No

## 2017-10-19 NOTE — MR AVS SNAPSHOT
Visit Information Date & Time Provider Department Dept. Phone Encounter #  
 10/19/2017  9:30 AM Isaac Briones NP [facility] [phone] [id number] Follow-up Instructions Return in about 4 weeks (around 11/16/2017). Follow-up and Disposition History Upcoming Health Maintenance Date Due DTaP/Tdap/Td series (1 - Tdap) 1/5/1991 INFLUENZA AGE 9 TO ADULT 8/1/2017 PAP AKA CERVICAL CYTOLOGY 12/6/2019 Allergies as of 10/19/2017  Review Complete On: 10/19/2017 By: Isaac Briones NP Severity Noted Reaction Type Reactions Percocet [Oxycodone-acetaminophen]  04/07/2015    Itching Current Immunizations  Never Reviewed No immunizations on file. Not reviewed this visit You Were Diagnosed With   
  
 Codes Comments Obesity (BMI 30-39.9)    -  Primary ICD-10-CM: Z57.7 ICD-9-CM: 278.00 Essential hypertension with goal blood pressure less than 140/90     ICD-10-CM: I10 
ICD-9-CM: 401.9 Vitals BP Pulse Temp Resp Height(growth percentile) Weight(growth percentile) (!) 130/92 (BP 1 Location: Right arm, BP Patient Position: Sitting) 73 98.2 °F (36.8 °C) (Oral) 18 5' 6\" (1.676 m) 201 lb 11.2 oz (91.5 kg) SpO2 BMI OB Status Smoking Status 97% 32.56 kg/m2 Having regular periods Former Smoker BMI and BSA Data Body Mass Index Body Surface Area 32.56 kg/m 2 2.06 m 2 Your Updated Medication List  
  
   
This list is accurate as of: 10/19/17  9:45 AM.  Always use your most recent med list.  
  
  
  
  
 biotin 2,500 mcg Tab Take 2,500 mcg by mouth daily. CALCIUM COMPLETE PO Take  by mouth daily. garlic 3,258 mg Cap Take 1,000 mg by mouth daily. hydroCHLOROthiazide 25 mg tablet Commonly known as:  HYDRODIURIL Take 25 mg by mouth daily. Indications: HYPERTENSION  
  
 iron,carbonyl-vitamin C 65 mg iron- 125 mg Tbec Take  mg by mouth daily. losartan 25 mg tablet Commonly known as:  COZAAR Take 25 mg by mouth daily. ONE DAILY COMPLETE PO Take  by mouth daily. VITAMIN D3 2,000 unit Tab Generic drug:  cholecalciferol (vitamin D3) Take 5,000 Units by mouth daily. Follow-up Instructions Return in about 4 weeks (around 11/16/2017). To-Do List   
 10/19/2017 Lab:  METABOLIC PANEL, COMPREHENSIVE   
  
 10/19/2017 Lab:  T4, FREE   
  
 10/19/2017 Lab:  TSH 3RD GENERATION   
  
 10/19/2017 Lab:  URIC ACID Patient Instructions Monthly goal: 
 
 
  
Body Mass Index: Care Instructions Your Care Instructions Body mass index (BMI) can help you see if your weight is raising your risk for health problems. It uses a formula to compare how much you weigh with how tall you are. · A BMI lower than 18.5 is considered underweight. · A BMI between 18.5 and 24.9 is considered healthy. · A BMI between 25 and 29.9 is considered overweight. A BMI of 30 or higher is considered obese. If your BMI is in the normal range, it means that you have a lower risk for weight-related health problems. If your BMI is in the overweight or obese range, you may be at increased risk for weight-related health problems, such as high blood pressure, heart disease, stroke, arthritis or joint pain, and diabetes. If your BMI is in the underweight range, you may be at increased risk for health problems such as fatigue, lower protection (immunity) against illness, muscle loss, bone loss, hair loss, and hormone problems. BMI is just one measure of your risk for weight-related health problems. You may be at higher risk for health problems if you are not active, you eat an unhealthy diet, or you drink too much alcohol or use tobacco products. Follow-up care is a key part of your treatment and safety.  Be sure to make and go to all appointments, and call your doctor if you are having problems. It's also a good idea to know your test results and keep a list of the medicines you take. How can you care for yourself at home? · Practice healthy eating habits. This includes eating plenty of fruits, vegetables, whole grains, lean protein, and low-fat dairy. · If your doctor recommends it, get more exercise. Walking is a good choice. Bit by bit, increase the amount you walk every day. Try for at least 30 minutes on most days of the week. · Do not smoke. Smoking can increase your risk for health problems. If you need help quitting, talk to your doctor about stop-smoking programs and medicines. These can increase your chances of quitting for good. · Limit alcohol to 2 drinks a day for men and 1 drink a day for women. Too much alcohol can cause health problems. If you have a BMI higher than 25 · Your doctor may do other tests to check your risk for weight-related health problems. This may include measuring the distance around your waist. A waist measurement of more than 40 inches in men or 35 inches in women can increase the risk of weight-related health problems. · Talk with your doctor about steps you can take to stay healthy or improve your health. You may need to make lifestyle changes to lose weight and stay healthy, such as changing your diet and getting regular exercise. If you have a BMI lower than 18.5 · Your doctor may do other tests to check your risk for health problems. · Talk with your doctor about steps you can take to stay healthy or improve your health. You may need to make lifestyle changes to gain or maintain weight and stay healthy, such as getting more healthy foods in your diet and doing exercises to build muscle. Where can you learn more? Go to http://kathy-shamar.info/. Enter S176 in the search box to learn more about \"Body Mass Index: Care Instructions. \" Current as of: January 23, 2017 Content Version: 11.3 © 8290-9445 Healthwise, [employer]. Care instructions adapted under license by [employer] (which disclaims liability or warranty for this information). If you have questions about a medical condition or this instruction, always ask your healthcare professional. [address] any warranty or liability for your use of this information. Health Maintenance Due Topic Date Due  
 DTaP/Tdap/Td series (1 - Tdap) 01/05/1991  
 INFLUENZA AGE 9 TO ADULT  08/01/2017 Patient Instructions History Introducing [facility]! Romina Bazan introduces Suda patient portal. Now you can access parts of your medical record, email your doctor's office, and request medication refills online. 1. In your internet browser, go to [email or web address] 2. Click on the First Time User? Click Here link in the Sign In box. You will see the New Member Sign Up page. 3. Enter your Suda Access Code exactly as it appears below. You will not need to use this code after youve completed the sign-up process. If you do not sign up before the expiration date, you must request a new code. · Suda Access Code: [email or web address] Expires: 1/17/2018  9:45 AM 
 
4. Enter the last four digits of your Social Security Number (xxxx) and Date of Birth (mm/dd/yyyy) as indicated and click Submit. You will be taken to the next sign-up page. 5. Create a Suda ID. This will be your Suda login ID and cannot be changed, so think of one that is secure and easy to remember. 6. Create a Suda password. You can change your password at any time. 7. Enter your Password Reset Question and Answer. This can be used at a later time if you forget your password. 8. Enter your e-mail address. You will receive e-mail notification when new information is available in [address] 9. Click Sign Up. You can now view and download portions of your medical record. 10. Click the Download Summary menu link to download a portable copy of your medical information. If you have questions, please visit the Frequently Asked Questions section of the [employer] ([employer]) website. Remember, [employer] is NOT to be used for urgent needs. For medical emergencies, dial 911. Now available from your iPhone and Android! Please provide this summary of care documentation to your next provider. Your primary care clinician is listed as Dianelys Torrez. If you have any questions after today's visit, please call [phone].

## 2017-10-19 NOTE — PATIENT INSTRUCTIONS
Monthly goal:         Body Mass Index: Care Instructions  Your Care Instructions    Body mass index (BMI) can help you see if your weight is raising your risk for health problems. It uses a formula to compare how much you weigh with how tall you are. · A BMI lower than 18.5 is considered underweight. · A BMI between 18.5 and 24.9 is considered healthy. · A BMI between 25 and 29.9 is considered overweight. A BMI of 30 or higher is considered obese. If your BMI is in the normal range, it means that you have a lower risk for weight-related health problems. If your BMI is in the overweight or obese range, you may be at increased risk for weight-related health problems, such as high blood pressure, heart disease, stroke, arthritis or joint pain, and diabetes. If your BMI is in the underweight range, you may be at increased risk for health problems such as fatigue, lower protection (immunity) against illness, muscle loss, bone loss, hair loss, and hormone problems. BMI is just one measure of your risk for weight-related health problems. You may be at higher risk for health problems if you are not active, you eat an unhealthy diet, or you drink too much alcohol or use tobacco products. Follow-up care is a key part of your treatment and safety. Be sure to make and go to all appointments, and call your doctor if you are having problems. It's also a good idea to know your test results and keep a list of the medicines you take. How can you care for yourself at home? · Practice healthy eating habits. This includes eating plenty of fruits, vegetables, whole grains, lean protein, and low-fat dairy. · If your doctor recommends it, get more exercise. Walking is a good choice. Bit by bit, increase the amount you walk every day. Try for at least 30 minutes on most days of the week. · Do not smoke. Smoking can increase your risk for health problems.  If you need help quitting, talk to your doctor about stop-smoking programs and medicines. These can increase your chances of quitting for good. · Limit alcohol to 2 drinks a day for men and 1 drink a day for women. Too much alcohol can cause health problems. If you have a BMI higher than 25  · Your doctor may do other tests to check your risk for weight-related health problems. This may include measuring the distance around your waist. A waist measurement of more than 40 inches in men or 35 inches in women can increase the risk of weight-related health problems. · Talk with your doctor about steps you can take to stay healthy or improve your health. You may need to make lifestyle changes to lose weight and stay healthy, such as changing your diet and getting regular exercise. If you have a BMI lower than 18.5  · Your doctor may do other tests to check your risk for health problems. · Talk with your doctor about steps you can take to stay healthy or improve your health. You may need to make lifestyle changes to gain or maintain weight and stay healthy, such as getting more healthy foods in your diet and doing exercises to build muscle. Where can you learn more? Go to http://kathy-shamar.info/. Enter S176 in the search box to learn more about \"Body Mass Index: Care Instructions. \"  Current as of: January 23, 2017  Content Version: 11.3  © 5402-8893 [employer]. Care instructions adapted under license by [employer] (which disclaims liability or warranty for this information). If you have questions about a medical condition or this instruction, always ask your healthcare professional. [place] [address] any warranty or liability for your use of this information.   Health Maintenance Due   Topic Date Due    DTaP/Tdap/Td series (1 - Tdap) 01/05/1991    INFLUENZA AGE 9 TO ADULT  08/01/2017

## 2017-10-19 NOTE — PROGRESS NOTES
[facility] Progress Note:   F/up Physician Visit    CC: Obesity      Elsa Jovel is a 52 y.o. female who is here for her  f/up physician visit for the VLCD Program. Adina Chaudhari has completed 13 weeks of the program to date. 3 lbs weight loss since last visit, was not seen for 3 mo. Has had viral illness going through the house over last few weeks, eating crackers and ginger ale. Starting weight: 206 lbs  Current weight: 201 lbs      Goal weight: 150 lbs      Most recent EK2016 at 206 lbs    Weight Metrics 10/19/2017 10/19/2017 10/13/2017 10/13/2017 10/6/2017 2017 2017   Weight - 201 lb 11.2 oz - 203 lb 3.2 oz 208 lb 3.2 oz 201 lb 9.6 oz 205 lb 6.4 oz   Waist Measure Inches 33 - 32 - 34 32 33   Exercise Mins/week 120 - - - - - -   Body Fat % 38.3 - - - - - -   BMI - 32.56 kg/m2 - 32.8 kg/m2 33.6 kg/m2 32.54 kg/m2 33.15 kg/m2         Current Outpatient Prescriptions   Medication Sig Dispense Refill    CALCIUM CARB/MAG OXIDE/C/BETA (CALCIUM COMPLETE PO) Take  by mouth daily.  cholecalciferol, vitamin D3, (VITAMIN D3) 2,000 unit tab Take 5,000 Units by mouth daily.  garlic 5,242 mg cap Take 1,000 mg by mouth daily.  losartan (COZAAR) 25 mg tablet Take 25 mg by mouth daily.  hydrochlorothiazide (HYDRODIURIL) 25 mg tablet Take 25 mg by mouth daily. Indications: HYPERTENSION  2    iron-vitamin C 65 mg iron- 125 mg TbEC Take  mg by mouth daily.  biotin 2,500 mcg Tab Take 2,500 mcg by mouth daily.  MULTIVITAMIN WITH MINERALS (ONE DAILY COMPLETE PO) Take  by mouth daily. Participation   Did you attend clinic and class last week? yes    Review of Systems  Since your last visit, have you experienced any complications? no  If yes, please list:     No CP, SOB, palpitations, lightheadedness, dizziness, constipation. Positives highlight in BOLD.       Are you taking an appetite suppressant? no  If so, is there any Chest Pain, Palpitations or Dizziness? BP Readings from Last 10 Encounters:   10/19/17 (!) 130/92   10/13/17 118/79   10/06/17 134/88   09/22/17 130/88   09/08/17 (!) 153/103   08/25/17 (!) 136/94   08/18/17 113/75   08/11/17 129/87   07/27/17 (!) 131/91   07/21/17 (!) 142/91         Have you received any other medical care this week? no  If yes, where and for what? Have you discontinued or changed any medicine or dose of your medicine since your last visit? no  If yes, where and for what? Diet  How many ounces of calorie-free liquids did you consume each day? 32-64 oz    How many meal replacements did you take each day? 2-4    Did you have any problems adhering to the program?  yes If yes, please explain: last 2-3 weeks sick eating chicken soup      Exercise  Aerobic exercise: 120 min  Resistance exercise: 0 workouts / week  Any discomfort?  no     If yes, where? Review of Systems  Complete ROS negative except where noted above    Objective  Visit Vitals    BP (!) 130/92 (BP 1 Location: Right arm, BP Patient Position: Sitting)  Comment: did not take blood pressure medication this AM    Pulse 73    Temp 98.2 °F (36.8 °C) (Oral)    Resp 18    Ht 5' 6\" (1.676 m)    Wt 201 lb 11.2 oz (91.5 kg)    SpO2 97%    BMI 32.56 kg/m2     No LMP recorded. Waist Circumference: I personally reviewed patient's Weight Management Doc Flowsheet  Neck Circumference: I personally reviewed patient's Weight Management Doc Flowsheet  Percent Body Fat: I personally reviewed patient's Weight Management Doc Flowsheet    Physical Exam  Appearance: well appearing, obese, A&O, NAD  HEENT:  NC/AT, PERRL, No scleral icterus  Heart:  RRR without M/R/G  Lungs:  CTAB, no rhonchi, rales, or wheezes with good air exchange   Ext:  No LE Edema    Assessment / Plan    Encounter Diagnoses   Name Primary?  Obesity (BMI 30-39. 9) Yes    Essential hypertension with goal blood pressure less than 140/90        1.   Weight management poorly controlled, loss of control due to intercurrent illness   Progress was reviewed with patient    2. Labs    Latest results reviewed with patient   Lab slip given to pt for f/up HDL labs    3. Diet regimen   # of meal replacements prescribed: 4   If modified LCD-nutritional guidelines:    Monthly Goal   As below    Medical monitoring schedule:   Weekly BP/Weight checks   Monthly provider appointments          Patient Instructions     Monthly goal:         Body Mass Index: Care Instructions  Your Care Instructions    Body mass index (BMI) can help you see if your weight is raising your risk for health problems. It uses a formula to compare how much you weigh with how tall you are. · A BMI lower than 18.5 is considered underweight. · A BMI between 18.5 and 24.9 is considered healthy. · A BMI between 25 and 29.9 is considered overweight. A BMI of 30 or higher is considered obese. If your BMI is in the normal range, it means that you have a lower risk for weight-related health problems. If your BMI is in the overweight or obese range, you may be at increased risk for weight-related health problems, such as high blood pressure, heart disease, stroke, arthritis or joint pain, and diabetes. If your BMI is in the underweight range, you may be at increased risk for health problems such as fatigue, lower protection (immunity) against illness, muscle loss, bone loss, hair loss, and hormone problems. BMI is just one measure of your risk for weight-related health problems. You may be at higher risk for health problems if you are not active, you eat an unhealthy diet, or you drink too much alcohol or use tobacco products. Follow-up care is a key part of your treatment and safety. Be sure to make and go to all appointments, and call your doctor if you are having problems. It's also a good idea to know your test results and keep a list of the medicines you take. How can you care for yourself at home? · Practice healthy eating habits.  This includes eating plenty of fruits, vegetables, whole grains, lean protein, and low-fat dairy. · If your doctor recommends it, get more exercise. Walking is a good choice. Bit by bit, increase the amount you walk every day. Try for at least 30 minutes on most days of the week. · Do not smoke. Smoking can increase your risk for health problems. If you need help quitting, talk to your doctor about stop-smoking programs and medicines. These can increase your chances of quitting for good. · Limit alcohol to 2 drinks a day for men and 1 drink a day for women. Too much alcohol can cause health problems. If you have a BMI higher than 25  · Your doctor may do other tests to check your risk for weight-related health problems. This may include measuring the distance around your waist. A waist measurement of more than 40 inches in men or 35 inches in women can increase the risk of weight-related health problems. · Talk with your doctor about steps you can take to stay healthy or improve your health. You may need to make lifestyle changes to lose weight and stay healthy, such as changing your diet and getting regular exercise. If you have a BMI lower than 18.5  · Your doctor may do other tests to check your risk for health problems. · Talk with your doctor about steps you can take to stay healthy or improve your health. You may need to make lifestyle changes to gain or maintain weight and stay healthy, such as getting more healthy foods in your diet and doing exercises to build muscle. Where can you learn more? Go to http://kathy-shamar.info/. Enter S176 in the search box to learn more about \"Body Mass Index: Care Instructions. \"  Current as of: January 23, 2017  Content Version: 11.3  © 3269-8067 Healthwise, Incorporated. Care instructions adapted under license by [employer] (which disclaims liability or warranty for this information).  If you have questions about a medical condition or this instruction, always ask your healthcare professional. [place] [address] any warranty or liability for your use of this information. Health Maintenance Due   Topic Date Due    DTaP/Tdap/Td series (1 - Tdap) 01/05/1991    INFLUENZA AGE 9 TO ADULT  08/01/2017             Follow-up Disposition:  Return in about 4 weeks (around 11/16/2017). 10 minutes of the 15 minutes face to face time with Gabi Dodson consisted of counseling & coordinating and/or discussing treatment plans in reference to her The primary encounter diagnosis was Obesity (BMI 30-39.9). A diagnosis of Essential hypertension with goal blood pressure less than 140/90 was also pertinent to this visit. The patient is to follow up as scheduled and will report to the ED or the office if symptoms change or increase. The patient has voiced understanding and will comply.

## 2017-11-03 ENCOUNTER — CLINICAL SUPPORT (OUTPATIENT)
Dept: FAMILY MEDICINE CLINIC | Age: 47
End: 2017-11-03

## 2017-11-03 VITALS
SYSTOLIC BLOOD PRESSURE: 125 MMHG | WEIGHT: 203.8 LBS | HEART RATE: 64 BPM | DIASTOLIC BLOOD PRESSURE: 87 MMHG | HEIGHT: 66 IN | BODY MASS INDEX: 32.75 KG/M2

## 2017-11-03 DIAGNOSIS — E66.9 OBESITY (BMI 30-39.9): Primary | ICD-10-CM

## 2017-11-03 NOTE — PROGRESS NOTES
Progress Note: Weekly Education Class in the [facility] Weight Loss Program   Is there anything that you or the patient needs to let the [address] [facility] Physician know about? no  Over the past week, have you experienced any side-effects? no    Thony Copeland is a 52 y.o. female who is enrolled in [facility]    Visit Vitals    /87 (BP 1 Location: Right arm, BP Patient Position: Sitting)    Pulse 64    Ht 5' 6\" (1.676 m)    Wt 203 lb 12.8 oz (92.4 kg)    BMI 32.89 kg/m2     Weight Metrics 11/3/2017 10/19/2017 10/19/2017 10/13/2017 10/13/2017 10/6/2017 9/22/2017   Weight 203 lb 12.8 oz - 201 lb 11.2 oz - 203 lb 3.2 oz 208 lb 3.2 oz 201 lb 9.6 oz   Waist Measure Inches 32 33 - 32 - 34 32   Exercise Mins/week - 120 - - - - -   Body Fat % - 38.3 - - - - -   BMI 32.89 kg/m2 - 32.56 kg/m2 - 32.8 kg/m2 33.6 kg/m2 32.54 kg/m2         Have you received any other medical care this week? no  If yes, where and for what? Have you had any change in your medications since your last visit? no  If yes what? Did you have any problems adhering to the program last week? no  If yes, please explain:       Eating Habits Over Last Week:  Did you take in 64 oz of non-caloric fluids?  no     Did you consume your 4 meal replacements each day? no       Physical Activity Over the Past Week:    Aerobic exercise: 0 min  Resistance exercise: 0 workouts / week

## 2017-11-10 ENCOUNTER — CLINICAL SUPPORT (OUTPATIENT)
Dept: FAMILY MEDICINE CLINIC | Age: 47
End: 2017-11-10

## 2017-11-10 VITALS
BODY MASS INDEX: 32.56 KG/M2 | HEART RATE: 69 BPM | DIASTOLIC BLOOD PRESSURE: 88 MMHG | HEIGHT: 66 IN | SYSTOLIC BLOOD PRESSURE: 123 MMHG | WEIGHT: 202.6 LBS

## 2017-11-10 DIAGNOSIS — E66.9 OBESITY (BMI 30-39.9): Primary | ICD-10-CM

## 2017-11-10 NOTE — PROGRESS NOTES
Progress Note: Weekly Education Class in the [facility] Loss Program   Is there anything that you or the patient needs to let the [address] [facility] Physician know about? no  Over the past week, have you experienced any side-effects? no    Veronica Abdi is a 52 y.o. female who is enrolled in [facility]    Visit Vitals    /88 (BP 1 Location: Right arm, BP Patient Position: Sitting)    Pulse 69    Ht 5' 6\" (1.676 m)    Wt 202 lb 9.6 oz (91.9 kg)    BMI 32.7 kg/m2     Weight Metrics 11/10/2017 11/3/2017 10/19/2017 10/19/2017 10/13/2017 10/13/2017 10/6/2017   Weight 202 lb 9.6 oz 203 lb 12.8 oz - 201 lb 11.2 oz - 203 lb 3.2 oz 208 lb 3.2 oz   Waist Measure Inches 31 32 33 - 32 - 34   Exercise Mins/week - - 120 - - - -   Body Fat % - - 38.3 - - - -   BMI 32.7 kg/m2 32.89 kg/m2 - 32.56 kg/m2 - 32.8 kg/m2 33.6 kg/m2         Have you received any other medical care this week? no  If yes, where and for what? Have you had any change in your medications since your last visit? no  If yes what? Did you have any problems adhering to the program last week? no  If yes, please explain:       Eating Habits Over Last Week:  Did you take in 64 oz of non-caloric fluids?  no     Did you consume your 4 meal replacements each day? no       Physical Activity Over the Past Week:    Aerobic exercise: 65 min  Resistance exercise: 30 min workouts / week

## 2017-11-17 ENCOUNTER — HOSPITAL ENCOUNTER (OUTPATIENT)
Dept: LAB | Age: 47
Discharge: HOME | End: 2017-11-17
Payer: COMMERCIAL

## 2017-11-17 ENCOUNTER — CLINICAL SUPPORT (OUTPATIENT)
Dept: FAMILY MEDICINE CLINIC | Age: 47
End: 2017-11-17

## 2017-11-17 VITALS
HEIGHT: 66 IN | WEIGHT: 205.2 LBS | BODY MASS INDEX: 32.98 KG/M2 | HEART RATE: 62 BPM | DIASTOLIC BLOOD PRESSURE: 88 MMHG | SYSTOLIC BLOOD PRESSURE: 129 MMHG

## 2017-11-17 DIAGNOSIS — E66.9 OBESITY (BMI 30-39.9): Primary | ICD-10-CM

## 2017-11-17 DIAGNOSIS — E66.9 OBESITY (BMI 30-39.9): ICD-10-CM

## 2017-11-17 LAB
ALBUMIN SERPL-MCNC: 3.7 G/DL (ref 3.4–5)
ALBUMIN/GLOB SERPL: 0.9 {RATIO} (ref 0.8–1.7)
ALP SERPL-CCNC: 71 U/L (ref 45–117)
ALT SERPL-CCNC: 38 U/L (ref 13–56)
ANION GAP SERPL CALC-SCNC: 6 MMOL/L (ref 3–18)
AST SERPL-CCNC: 27 U/L (ref 15–37)
BILIRUB SERPL-MCNC: 0.3 MG/DL (ref 0.2–1)
BUN SERPL-MCNC: 7 MG/DL (ref 7–18)
BUN/CREAT SERPL: 8 (ref 12–20)
CALCIUM SERPL-MCNC: 8.9 MG/DL (ref 8.5–10.1)
CHLORIDE SERPL-SCNC: 105 MMOL/L (ref 100–108)
CO2 SERPL-SCNC: 31 MMOL/L (ref 21–32)
CREAT SERPL-MCNC: 0.85 MG/DL (ref 0.6–1.3)
GLOBULIN SER CALC-MCNC: 4 G/DL (ref 2–4)
GLUCOSE SERPL-MCNC: 70 MG/DL (ref 74–99)
POTASSIUM SERPL-SCNC: 3.8 MMOL/L (ref 3.5–5.5)
PROT SERPL-MCNC: 7.7 G/DL (ref 6.4–8.2)
SODIUM SERPL-SCNC: 142 MMOL/L (ref 136–145)
T4 FREE SERPL-MCNC: 1.1 NG/DL (ref 0.7–1.5)
TSH SERPL DL<=0.05 MIU/L-ACNC: 0.93 UIU/ML (ref 0.36–3.74)
URATE SERPL-MCNC: 3.5 MG/DL (ref 2.6–7.2)

## 2017-11-17 PROCEDURE — 80053 COMPREHEN METABOLIC PANEL: CPT | Performed by: NURSE PRACTITIONER

## 2017-11-17 PROCEDURE — 84550 ASSAY OF BLOOD/URIC ACID: CPT | Performed by: NURSE PRACTITIONER

## 2017-11-17 PROCEDURE — 36415 COLL VENOUS BLD VENIPUNCTURE: CPT | Performed by: NURSE PRACTITIONER

## 2017-11-17 PROCEDURE — 84439 ASSAY OF FREE THYROXINE: CPT | Performed by: NURSE PRACTITIONER

## 2017-11-17 PROCEDURE — 84443 ASSAY THYROID STIM HORMONE: CPT | Performed by: NURSE PRACTITIONER

## 2017-11-17 NOTE — PROGRESS NOTES
Progress Note: Weekly Education Class in the [facility]   Is there anything that you or the patient needs to let the [address] [facility] Physician know about? no  Over the past week, have you experienced any side-effects? no    Greer Coppola is a 52 y.o. female who is enrolled in [facility]    Visit Vitals    /88 (BP 1 Location: Right arm, BP Patient Position: Sitting)    Pulse 62    Ht 5' 6\" (1.676 m)    Wt 205 lb 3.2 oz (93.1 kg)    BMI 33.12 kg/m2     Weight Metrics 11/17/2017 11/10/2017 11/3/2017 10/19/2017 10/19/2017 10/13/2017 10/13/2017   Weight 205 lb 3.2 oz 202 lb 9.6 oz 203 lb 12.8 oz - 201 lb 11.2 oz - 203 lb 3.2 oz   Waist Measure Inches 33 31 32 33 - 32 -   Exercise Mins/week - - - 120 - - -   Body Fat % - - - 38.3 - - -   BMI 33.12 kg/m2 32.7 kg/m2 32.89 kg/m2 - 32.56 kg/m2 - 32.8 kg/m2         Have you received any other medical care this week? no  If yes, where and for what? Have you had any change in your medications since your last visit? no  If yes what? Did you have any problems adhering to the program last week? no  If yes, please explain:       Eating Habits Over Last Week:  Did you take in 64 oz of non-caloric fluids? no     Did you consume your 4 meal replacements each day?  yes       Physical Activity Over the Past Week:    Aerobic exercise: 95 min  Resistance exercise: 0 workouts / week

## 2017-11-22 ENCOUNTER — OFFICE VISIT (OUTPATIENT)
Dept: INTERNAL MEDICINE CLINIC | Age: 47
End: 2017-11-22

## 2017-11-22 VITALS
WEIGHT: 204.3 LBS | HEIGHT: 66 IN | DIASTOLIC BLOOD PRESSURE: 98 MMHG | HEART RATE: 66 BPM | BODY MASS INDEX: 32.83 KG/M2 | SYSTOLIC BLOOD PRESSURE: 136 MMHG | TEMPERATURE: 98.4 F | OXYGEN SATURATION: 98 % | RESPIRATION RATE: 12 BRPM

## 2017-11-22 DIAGNOSIS — I10 ESSENTIAL HYPERTENSION WITH GOAL BLOOD PRESSURE LESS THAN 140/90: ICD-10-CM

## 2017-11-22 DIAGNOSIS — E66.9 OBESITY (BMI 30-39.9): Primary | ICD-10-CM

## 2017-11-22 RX ORDER — ERGOCALCIFEROL 1.25 MG/1
CAPSULE ORAL
Refills: 0 | COMMUNITY
Start: 2017-10-26 | End: 2018-04-12 | Stop reason: ALTCHOICE

## 2017-11-22 NOTE — PATIENT INSTRUCTIONS
Health Maintenance Due   Topic Date Due    DTaP/Tdap/Td series (1 - Tdap) 01/05/1991    Influenza Age 5 to Adult  08/01/2017     Try Low Calorie Diet    2 replacements/day plus 500-600 calorie meal    Build meal with < 25 g carbs. Body Mass Index: Care Instructions  Your Care Instructions    Body mass index (BMI) can help you see if your weight is raising your risk for health problems. It uses a formula to compare how much you weigh with how tall you are. · A BMI lower than 18.5 is considered underweight. · A BMI between 18.5 and 24.9 is considered healthy. · A BMI between 25 and 29.9 is considered overweight. A BMI of 30 or higher is considered obese. If your BMI is in the normal range, it means that you have a lower risk for weight-related health problems. If your BMI is in the overweight or obese range, you may be at increased risk for weight-related health problems, such as high blood pressure, heart disease, stroke, arthritis or joint pain, and diabetes. If your BMI is in the underweight range, you may be at increased risk for health problems such as fatigue, lower protection (immunity) against illness, muscle loss, bone loss, hair loss, and hormone problems. BMI is just one measure of your risk for weight-related health problems. You may be at higher risk for health problems if you are not active, you eat an unhealthy diet, or you drink too much alcohol or use tobacco products. Follow-up care is a key part of your treatment and safety. Be sure to make and go to all appointments, and call your doctor if you are having problems. It's also a good idea to know your test results and keep a list of the medicines you take. How can you care for yourself at home? · Practice healthy eating habits. This includes eating plenty of fruits, vegetables, whole grains, lean protein, and low-fat dairy. · If your doctor recommends it, get more exercise. Walking is a good choice.  Bit by bit, increase the amount you walk every day. Try for at least 30 minutes on most days of the week. · Do not smoke. Smoking can increase your risk for health problems. If you need help quitting, talk to your doctor about stop-smoking programs and medicines. These can increase your chances of quitting for good. · Limit alcohol to 2 drinks a day for men and 1 drink a day for women. Too much alcohol can cause health problems. If you have a BMI higher than 25  · Your doctor may do other tests to check your risk for weight-related health problems. This may include measuring the distance around your waist. A waist measurement of more than 40 inches in men or 35 inches in women can increase the risk of weight-related health problems. · Talk with your doctor about steps you can take to stay healthy or improve your health. You may need to make lifestyle changes to lose weight and stay healthy, such as changing your diet and getting regular exercise. If you have a BMI lower than 18.5  · Your doctor may do other tests to check your risk for health problems. · Talk with your doctor about steps you can take to stay healthy or improve your health. You may need to make lifestyle changes to gain or maintain weight and stay healthy, such as getting more healthy foods in your diet and doing exercises to build muscle. Where can you learn more? Go to http://kathy-shamar.info/. Enter S176 in the search box to learn more about \"Body Mass Index: Care Instructions. \"  Current as of: October 13, 2016  Content Version: 11.4  © 0147-6877 Healthwise, [employer]. Care instructions adapted under license by [employer] (which disclaims liability or warranty for this information). If you have questions about a medical condition or this instruction, always ask your healthcare professional. [place] [address] any warranty or liability for your use of this information.

## 2017-11-22 NOTE — MR AVS SNAPSHOT
Visit Information Date & Time Provider Department Dept. Phone Encounter #  
 11/22/2017  9:30 AM Sandeep Nuñez NP [facility] [phone] Follow-up Instructions Return in about 4 weeks (around 12/20/2017). Follow-up and Disposition History Your Appointments 12/29/2017 10:05 AM  
LAB with [facility] NURSE VISIT [facility] ([address]) Appt Note: labs [address], [address] [facility] [address]  
  
   
 [address], [address]  
  
    
 1/4/2018 72:75 AM  
METABOLIC PROGRAM 15 with Sandeep Nuñez NP [facility] ([address]) Appt Note: mwl  
 [address], [address] [facility] [address]  
  
   
 [address], [address] Upcoming Health Maintenance Date Due DTaP/Tdap/Td series (1 - Tdap) 1/5/1991 Influenza Age 5 to Adult 8/1/2017 PAP AKA CERVICAL CYTOLOGY 12/6/2019 Allergies as of 11/22/2017  Review Complete On: 11/22/2017 By: Sandeep Nuñez NP Severity Noted Reaction Type Reactions Percocet [Oxycodone-acetaminophen]  04/07/2015    Itching Current Immunizations  Never Reviewed No immunizations on file. Not reviewed this visit You Were Diagnosed With   
  
 Codes Comments Obesity (BMI 30-39.9)    -  Primary ICD-10-CM: H27.5 ICD-9-CM: 278.00 Essential hypertension with goal blood pressure less than 140/90     ICD-10-CM: I10 
ICD-9-CM: 401.9 Vitals BP Pulse Temp Resp Height(growth percentile) Weight(growth percentile) (!) 136/98 (BP 1 Location: Right arm, BP Patient Position: Sitting) 66 98.4 °F (36.9 °C) (Oral) 12 5' 6\" (1.676 m) 204 lb 4.8 oz (92.7 kg) LMP SpO2 BMI OB Status Smoking Status 08/31/2017 98% 32.97 kg/m2 Having regular periods Former Smoker BMI and BSA Data Body Mass Index Body Surface Area 32.97 kg/m 2 2.08 m 2 Your Updated Medication List  
  
   
This list is accurate as of: 11/22/17 10:14 AM.  Always use your most recent med list.  
  
  
  
  
 biotin 2,500 mcg Tab Take 2,500 mcg by mouth daily. ergocalciferol 50,000 unit capsule Commonly known as:  ERGOCALCIFEROL  
TAKE 1 CAP BY MOUTH 1 TIME A WEEK X 8 WEEKS THEN TAKE VIT D OTC @ 5712K DAILY  
  
 garlic 8,398 mg Cap Take 1,000 mg by mouth daily. hydroCHLOROthiazide 25 mg tablet Commonly known as:  HYDRODIURIL Take 25 mg by mouth daily. Indications: HYPERTENSION  
  
 iron,carbonyl-vitamin C 65 mg iron- 125 mg Tbec Take  mg by mouth daily. losartan 25 mg tablet Commonly known as:  COZAAR Take 25 mg by mouth daily. ONE DAILY COMPLETE PO Take  by mouth daily. VITAMIN D3 2,000 unit Tab Generic drug:  cholecalciferol (vitamin D3) Take 5,000 Units by mouth daily. Follow-up Instructions Return in about 4 weeks (around 12/20/2017). To-Do List   
 11/22/2017 Lab:  METABOLIC PANEL, COMPREHENSIVE   
  
 11/22/2017 Lab:  URIC ACID Patient Instructions Health Maintenance Due Topic Date Due  
 DTaP/Tdap/Td series (1 - Tdap) 01/05/1991  Influenza Age 5 to Adult  08/01/2017 Try Low Calorie Diet 
 
2 replacements/day plus 500-600 calorie meal 
 
Build meal with < 25 g carbs. Body Mass Index: Care Instructions Your Care Instructions Body mass index (BMI) can help you see if your weight is raising your risk for health problems. It uses a formula to compare how much you weigh with how tall you are. · A BMI lower than 18.5 is considered underweight. · A BMI between 18.5 and 24.9 is considered healthy. · A BMI between 25 and 29.9 is considered overweight. A BMI of 30 or higher is considered obese.  
If your BMI is in the normal range, it means that you have a lower risk for weight-related health problems. If your BMI is in the overweight or obese range, you may be at increased risk for weight-related health problems, such as high blood pressure, heart disease, stroke, arthritis or joint pain, and diabetes. If your BMI is in the underweight range, you may be at increased risk for health problems such as fatigue, lower protection (immunity) against illness, muscle loss, bone loss, hair loss, and hormone problems. BMI is just one measure of your risk for weight-related health problems. You may be at higher risk for health problems if you are not active, you eat an unhealthy diet, or you drink too much alcohol or use tobacco products. Follow-up care is a key part of your treatment and safety. Be sure to make and go to all appointments, and call your doctor if you are having problems. It's also a good idea to know your test results and keep a list of the medicines you take. How can you care for yourself at home? · Practice healthy eating habits. This includes eating plenty of fruits, vegetables, whole grains, lean protein, and low-fat dairy. · If your doctor recommends it, get more exercise. Walking is a good choice. Bit by bit, increase the amount you walk every day. Try for at least 30 minutes on most days of the week. · Do not smoke. Smoking can increase your risk for health problems. If you need help quitting, talk to your doctor about stop-smoking programs and medicines. These can increase your chances of quitting for good. · Limit alcohol to 2 drinks a day for men and 1 drink a day for women. Too much alcohol can cause health problems. If you have a BMI higher than 25 · Your doctor may do other tests to check your risk for weight-related health problems. This may include measuring the distance around your waist. A waist measurement of more than 40 inches in men or 35 inches in women can increase the risk of weight-related health problems. · Talk with your doctor about steps you can take to stay healthy or improve your health. You may need to make lifestyle changes to lose weight and stay healthy, such as changing your diet and getting regular exercise. If you have a BMI lower than 18.5 · Your doctor may do other tests to check your risk for health problems. · Talk with your doctor about steps you can take to stay healthy or improve your health. You may need to make lifestyle changes to gain or maintain weight and stay healthy, such as getting more healthy foods in your diet and doing exercises to build muscle. Where can you learn more? Go to http://kathy-shamar.info/. Enter S176 in the search box to learn more about \"Body Mass Index: Care Instructions. \" Current as of: October 13, 2016 Content Version: 11.4 © 4068-9941 [employer]. Care instructions adapted under license by [employer] (which disclaims liability or warranty for this information). If you have questions about a medical condition or this instruction, always ask your healthcare professional. [address] any warranty or liability for your use of this information. Patient Instructions History Introducing [facility]! Africa Mccormick introduces I and love and you patient portal. Now you can access parts of your medical record, email your doctor's office, and request medication refills online. 1. In your internet browser, go to [email or web address] 2. Click on the First Time User? Click Here link in the Sign In box. You will see the New Member Sign Up page. 3. Enter your [employer] and love and you Access Code exactly as it appears below. You will not need to use this code after youve completed the sign-up process. If you do not sign up before the expiration date, you must request a new code. · I and love and you Access Code: [email or web address] Expires: 1/17/2018  8:45 AM 
 
 4. Enter the last four digits of your Social Security Number (xxxx) and Date of Birth (mm/dd/yyyy) as indicated and click Submit. You will be taken to the next sign-up page. 5. Create a [employer] ID. This will be your [employer] login ID and cannot be changed, so think of one that is secure and easy to remember. 6. Create a [employer] password. You can change your password at any time. 7. Enter your Password Reset Question and Answer. This can be used at a later time if you forget your password. 8. Enter your e-mail address. You will receive e-mail notification when new information is available in [address] 9. Click Sign Up. You can now view and download portions of your medical record. 10. Click the Download Summary menu link to download a portable copy of your medical information. If you have questions, please visit the Frequently Asked Questions section of the [employer] website. Remember, [employer] is NOT to be used for urgent needs. For medical emergencies, dial 911. Now available from your iPhone and Android! Please provide this summary of care documentation to your next provider. Your primary care clinician is listed as Margaret Bustos. If you have any questions after today's visit, please call [phone].

## 2017-11-22 NOTE — PROGRESS NOTES
Chief Complaint   Patient presents with    Weight Management     1. Have you been to the ER, urgent care clinic since your last visit? Hospitalized since your last visit? No    2. Have you seen or consulted any other health care providers outside of the [address] [place] [address] since your last visit? Include any pap smears or colon screening.  No

## 2017-11-22 NOTE — PROGRESS NOTES
[facility] Progress Note:   F/up Physician Visit    CC: Obesity      Ade Bradley is a 52 y.o. female who is here for her  f/up physician visit for the VLCD Program. Shaylee Erickson has completed 18 weeks of the program to date. 3 lbs weight gain since last visit. Does not like taste of product, struggling to get product in, states does 2-3 products/day. Discussed transition to LCD and willing to try to see if that will help with motivation and disinterest in product.     Starting weight: 206 lbs  Current weight: 204 lbs      Goal weight: 150 lbs      Most recent EK2016 at 206 lbs    Weight Metrics 2017 2017 2017 2017 11/10/2017 11/3/2017 10/19/2017   Weight - 204 lb 4.8 oz - 205 lb 3.2 oz 202 lb 9.6 oz 203 lb 12.8 oz -   Waist Measure Inches 32 - 33 - 31 32 33   Exercise Mins/week 60 - - - - - 120   Body Fat % 39.3 - - - - - 38.3   BMI - 32.97 kg/m2 - 33.12 kg/m2 32.7 kg/m2 32.89 kg/m2 -         Current Outpatient Prescriptions   Medication Sig Dispense Refill    ergocalciferol (ERGOCALCIFEROL) 50,000 unit capsule TAKE 1 CAP BY MOUTH 1 TIME A WEEK X 8 WEEKS THEN TAKE VIT D OTC @ 2000U DAILY  0    garlic 2,574 mg cap Take 1,000 mg by mouth daily.  losartan (COZAAR) 25 mg tablet Take 25 mg by mouth daily.  hydrochlorothiazide (HYDRODIURIL) 25 mg tablet Take 25 mg by mouth daily. Indications: HYPERTENSION  2    iron-vitamin C 65 mg iron- 125 mg TbEC Take  mg by mouth daily.  biotin 2,500 mcg Tab Take 2,500 mcg by mouth daily.  MULTIVITAMIN WITH MINERALS (ONE DAILY COMPLETE PO) Take  by mouth daily.  cholecalciferol, vitamin D3, (VITAMIN D3) 2,000 unit tab Take 5,000 Units by mouth daily. Participation   Did you attend clinic and class last week?  yes    Review of Systems  Since your last visit, have you experienced any complications? no  If yes, please list:     No CP, SOB, palpitations, lightheadedness, dizziness, constipation. Positives highlight in BOLD. Are you taking an appetite suppressant? no  If so, is there any Chest Pain, Palpitations or Dizziness? BP Readings from Last 10 Encounters:   11/22/17 (!) 136/98   11/17/17 129/88   11/10/17 123/88   11/03/17 125/87   10/19/17 (!) 130/92   10/13/17 118/79   10/06/17 134/88   09/22/17 130/88   09/08/17 (!) 153/103   08/25/17 (!) 136/94         Have you received any other medical care this week? no  If yes, where and for what? Have you discontinued or changed any medicine or dose of your medicine since your last visit? no  If yes, where and for what? Diet  How many ounces of calorie-free liquids did you consume each day? 32-64 oz    How many meal replacements did you take each day? 2-4    Did you have any problems adhering to the program?  yes If yes, please explain: due to the taste of produce unable to consume      Exercise  Aerobic exercise: 60 min  Resistance exercise: 0 workouts / week  Any discomfort?  no     If yes, where? Review of Systems  Complete ROS negative except where noted above    Objective  Visit Vitals    BP (!) 136/98 (BP 1 Location: Right arm, BP Patient Position: Sitting)  Comment: did not take blood pressure medication in AM    Pulse 66    Temp 98.4 °F (36.9 °C) (Oral)    Resp 12    Ht 5' 6\" (1.676 m)    Wt 204 lb 4.8 oz (92.7 kg)    LMP 08/31/2017  Comment: period is irregular    SpO2 98%    BMI 32.97 kg/m2     Patient's last menstrual period was 08/31/2017.     Waist Circumference: I personally reviewed patient's Weight Management Doc Flowsheet  Neck Circumference: I personally reviewed patient's Weight Management Doc Flowsheet  Percent Body Fat: I personally reviewed patient's Weight Management Doc Flowsheet    Physical Exam  Appearance: well appearing, obese, A&O, NAD  HEENT:  NC/AT, PERRL, No scleral icterus  Heart:  RRR without M/R/G  Lungs:  CTAB, no rhonchi, rales, or wheezes with good air exchange   Ext:  No LE Edema    Assessment / Plan    Encounter Diagnoses   Name Primary?  Obesity (BMI 30-39. 9) Yes    Essential hypertension with goal blood pressure less than 140/90        1. Weight management poorly controlled, patient poorly compliant   Progress was reviewed with patient    2. Labs    Latest results reviewed with patient   Lab slip given to pt for f/up HDL labs    3. Diet regimen   # of meal replacements prescribed: 2 = 500 calorie meal   If modified LCD-nutritional guidelines:    Monthly Goal   As below    Medical monitoring schedule:   Weekly BP/Weight checks   Monthly provider appointments          Patient Instructions     Health Maintenance Due   Topic Date Due    DTaP/Tdap/Td series (1 - Tdap) 01/05/1991    Influenza Age 5 to Adult  08/01/2017     Try Low Calorie Diet    2 replacements/day plus 500-600 calorie meal    Build meal with < 25 g carbs. Body Mass Index: Care Instructions  Your Care Instructions    Body mass index (BMI) can help you see if your weight is raising your risk for health problems. It uses a formula to compare how much you weigh with how tall you are. · A BMI lower than 18.5 is considered underweight. · A BMI between 18.5 and 24.9 is considered healthy. · A BMI between 25 and 29.9 is considered overweight. A BMI of 30 or higher is considered obese. If your BMI is in the normal range, it means that you have a lower risk for weight-related health problems. If your BMI is in the overweight or obese range, you may be at increased risk for weight-related health problems, such as high blood pressure, heart disease, stroke, arthritis or joint pain, and diabetes. If your BMI is in the underweight range, you may be at increased risk for health problems such as fatigue, lower protection (immunity) against illness, muscle loss, bone loss, hair loss, and hormone problems. BMI is just one measure of your risk for weight-related health problems.  You may be at higher risk for health problems if you are not active, you eat an unhealthy diet, or you drink too much alcohol or use tobacco products. Follow-up care is a key part of your treatment and safety. Be sure to make and go to all appointments, and call your doctor if you are having problems. It's also a good idea to know your test results and keep a list of the medicines you take. How can you care for yourself at home? · Practice healthy eating habits. This includes eating plenty of fruits, vegetables, whole grains, lean protein, and low-fat dairy. · If your doctor recommends it, get more exercise. Walking is a good choice. Bit by bit, increase the amount you walk every day. Try for at least 30 minutes on most days of the week. · Do not smoke. Smoking can increase your risk for health problems. If you need help quitting, talk to your doctor about stop-smoking programs and medicines. These can increase your chances of quitting for good. · Limit alcohol to 2 drinks a day for men and 1 drink a day for women. Too much alcohol can cause health problems. If you have a BMI higher than 25  · Your doctor may do other tests to check your risk for weight-related health problems. This may include measuring the distance around your waist. A waist measurement of more than 40 inches in men or 35 inches in women can increase the risk of weight-related health problems. · Talk with your doctor about steps you can take to stay healthy or improve your health. You may need to make lifestyle changes to lose weight and stay healthy, such as changing your diet and getting regular exercise. If you have a BMI lower than 18.5  · Your doctor may do other tests to check your risk for health problems. · Talk with your doctor about steps you can take to stay healthy or improve your health. You may need to make lifestyle changes to gain or maintain weight and stay healthy, such as getting more healthy foods in your diet and doing exercises to build muscle.   Where can you learn more? Go to http://kathy-shamar.info/. Enter S176 in the search box to learn more about \"Body Mass Index: Care Instructions. \"  Current as of: October 13, 2016  Content Version: 11.4  © 7737-5802 [employer]. Care instructions adapted under license by [employer] (which disclaims liability or warranty for this information). If you have questions about a medical condition or this instruction, always ask your healthcare professional. [place] [address] any warranty or liability for your use of this information. Follow-up Disposition:  Return in about 4 weeks (around 12/20/2017). 10 minutes of the 15 minutes face to face time with Peggy Zeng consisted of counseling & coordinating and/or discussing treatment plans in reference to her The primary encounter diagnosis was Obesity (BMI 30-39.9). A diagnosis of Essential hypertension with goal blood pressure less than 140/90 was also pertinent to this visit. The patient is to follow up as scheduled and will report to the ED or the office if symptoms change or increase. The patient has voiced understanding and will comply.

## 2017-12-08 ENCOUNTER — CLINICAL SUPPORT (OUTPATIENT)
Dept: FAMILY MEDICINE CLINIC | Age: 47
End: 2017-12-08

## 2017-12-08 VITALS
HEIGHT: 66 IN | WEIGHT: 208.2 LBS | BODY MASS INDEX: 33.46 KG/M2 | DIASTOLIC BLOOD PRESSURE: 99 MMHG | SYSTOLIC BLOOD PRESSURE: 150 MMHG | HEART RATE: 67 BPM

## 2017-12-08 DIAGNOSIS — E66.9 OBESITY (BMI 30-39.9): Primary | ICD-10-CM

## 2017-12-08 NOTE — PROGRESS NOTES
Progress Note: Weekly Education Class in the [facility]   Is there anything that you or the patient needs to let the [facility] Physician know about? Yes, patient's weight is up again and her Blood Pressure is creeping up again too, 150/99, Pulse 67. The patient denies any pain, no dizziness, and states she feels fine. Over the past week, have you experienced any side-effects? no    Orville Mallory is a 52 y.o. female who is enrolled in [facility]    Visit Vitals    BP (!) 150/99 (BP 1 Location: Right arm, BP Patient Position: Sitting)    Pulse 67    Ht 5' 6\" (1.676 m)    Wt 208 lb 3.2 oz (94.4 kg)    LMP 08/31/2017  Comment: period is irregular    BMI 33.6 kg/m2     Weight Metrics 12/8/2017 11/22/2017 11/22/2017 11/17/2017 11/17/2017 11/10/2017 11/3/2017   Weight 208 lb 3.2 oz - 204 lb 4.8 oz - 205 lb 3.2 oz 202 lb 9.6 oz 203 lb 12.8 oz   Waist Measure Inches 32 32 - 33 - 31 32   Exercise Mins/week - 60 - - - - -   Body Fat % - 39.3 - - - - -   BMI 33.6 kg/m2 - 32.97 kg/m2 - 33.12 kg/m2 32.7 kg/m2 32.89 kg/m2         Have you received any other medical care this week? no  If yes, where and for what? Have you had any change in your medications since your last visit? no  If yes what? Did you have any problems adhering to the program last week? no  If yes, please explain:       Eating Habits Over Last Week:  Did you take in 64 oz of non-caloric fluids?  no     Did you consume your 4 meal replacements each day? no eating outside foods      Physical Activity Over the Past Week:    Aerobic exercise: 30 min  Resistance exercise: 0 workouts / week

## 2017-12-15 ENCOUNTER — CLINICAL SUPPORT (OUTPATIENT)
Dept: FAMILY MEDICINE CLINIC | Age: 47
End: 2017-12-15

## 2017-12-15 VITALS
DIASTOLIC BLOOD PRESSURE: 93 MMHG | SYSTOLIC BLOOD PRESSURE: 135 MMHG | BODY MASS INDEX: 33.17 KG/M2 | WEIGHT: 206.4 LBS | HEART RATE: 70 BPM | RESPIRATION RATE: 12 BRPM | HEIGHT: 66 IN

## 2017-12-15 DIAGNOSIS — E66.9 OBESITY (BMI 30-39.9): Primary | ICD-10-CM

## 2017-12-15 NOTE — PROGRESS NOTES
Progress Note: Weekly Education Class in the [facility]   Is there anything that you or the patient needs to let the [facility] Physician know about? no  Over the past week, have you experienced any side-effects? no    Marissa Colby is a 52 y.o. female who is enrolled in [facility]    Visit Vitals    BP (!) 135/93 (BP 1 Location: Right arm, BP Patient Position: Sitting)    Pulse 70    Resp 12    Ht 5' 6\" (1.676 m)    Wt 206 lb 6.4 oz (93.6 kg)    LMP 08/31/2017  Comment: period is irregular    BMI 33.31 kg/m2     Weight Metrics 12/15/2017 12/8/2017 11/22/2017 11/22/2017 11/17/2017 11/17/2017 11/10/2017   Weight 206 lb 6.4 oz 208 lb 3.2 oz - 204 lb 4.8 oz - 205 lb 3.2 oz 202 lb 9.6 oz   Waist Measure Inches 33 32 32 - 33 - 31   Exercise Mins/week - - 60 - - - -   Body Fat % - - 39.3 - - - -   BMI 33.31 kg/m2 33.6 kg/m2 - 32.97 kg/m2 - 33.12 kg/m2 32.7 kg/m2         Have you received any other medical care this week? no  If yes, where and for what? Have you had any change in your medications since your last visit? no  If yes what? Did you have any problems adhering to the program last week? no  If yes, please explain:       Eating Habits Over Last Week:  Did you take in 64 oz of non-caloric fluids?  no     Did you consume your 4 meal replacements each day? no       Physical Activity Over the Past Week:    Aerobic exercise: 0 min  Resistance exercise: 0 workouts / week

## 2017-12-29 ENCOUNTER — CLINICAL SUPPORT (OUTPATIENT)
Dept: FAMILY MEDICINE CLINIC | Age: 47
End: 2017-12-29

## 2017-12-29 ENCOUNTER — HOSPITAL ENCOUNTER (OUTPATIENT)
Dept: LAB | Age: 47
Discharge: HOME | End: 2017-12-29
Payer: COMMERCIAL

## 2017-12-29 VITALS
BODY MASS INDEX: 34.84 KG/M2 | DIASTOLIC BLOOD PRESSURE: 86 MMHG | WEIGHT: 216.8 LBS | HEIGHT: 66 IN | HEART RATE: 74 BPM | SYSTOLIC BLOOD PRESSURE: 132 MMHG

## 2017-12-29 DIAGNOSIS — E66.9 OBESITY (BMI 30-39.9): ICD-10-CM

## 2017-12-29 DIAGNOSIS — E66.9 OBESITY (BMI 30-39.9): Primary | ICD-10-CM

## 2017-12-29 LAB
ALBUMIN SERPL-MCNC: 3.5 G/DL (ref 3.4–5)
ALBUMIN/GLOB SERPL: 0.9 {RATIO} (ref 0.8–1.7)
ALP SERPL-CCNC: 70 U/L (ref 45–117)
ALT SERPL-CCNC: 29 U/L (ref 13–56)
ANION GAP SERPL CALC-SCNC: 5 MMOL/L (ref 3–18)
AST SERPL-CCNC: 21 U/L (ref 15–37)
BILIRUB SERPL-MCNC: 0.4 MG/DL (ref 0.2–1)
BUN SERPL-MCNC: 9 MG/DL (ref 7–18)
BUN/CREAT SERPL: 11 (ref 12–20)
CALCIUM SERPL-MCNC: 8.7 MG/DL (ref 8.5–10.1)
CHLORIDE SERPL-SCNC: 105 MMOL/L (ref 100–108)
CO2 SERPL-SCNC: 33 MMOL/L (ref 21–32)
CREAT SERPL-MCNC: 0.82 MG/DL (ref 0.6–1.3)
GLOBULIN SER CALC-MCNC: 3.8 G/DL (ref 2–4)
GLUCOSE SERPL-MCNC: 76 MG/DL (ref 74–99)
POTASSIUM SERPL-SCNC: 3.8 MMOL/L (ref 3.5–5.5)
PROT SERPL-MCNC: 7.3 G/DL (ref 6.4–8.2)
SODIUM SERPL-SCNC: 143 MMOL/L (ref 136–145)
URATE SERPL-MCNC: 3.8 MG/DL (ref 2.6–7.2)

## 2017-12-29 PROCEDURE — 84550 ASSAY OF BLOOD/URIC ACID: CPT | Performed by: NURSE PRACTITIONER

## 2017-12-29 PROCEDURE — 80053 COMPREHEN METABOLIC PANEL: CPT | Performed by: NURSE PRACTITIONER

## 2017-12-29 PROCEDURE — 36415 COLL VENOUS BLD VENIPUNCTURE: CPT | Performed by: NURSE PRACTITIONER

## 2017-12-29 NOTE — PROGRESS NOTES
Progress Note: Weekly Education Class in the [facility]   Is there anything that you or the patient needs to let the [facility] Physician know about? no  Over the past week, have you experienced any side-effects? no    Erum Riddle is a 52 y.o. female who is enrolled in [facility]    Visit Vitals    /86 (BP 1 Location: Right arm, BP Patient Position: Sitting)    Pulse 74    Ht 5' 6\" (1.676 m)    Wt 216 lb 12.8 oz (98.3 kg)    BMI 34.99 kg/m2     Weight Metrics 12/29/2017 12/15/2017 12/8/2017 11/22/2017 11/22/2017 11/17/2017 11/17/2017   Weight 216 lb 12.8 oz 206 lb 6.4 oz 208 lb 3.2 oz - 204 lb 4.8 oz - 205 lb 3.2 oz   Waist Measure Inches 34 33 32 32 - 33 -   Exercise Mins/week - - - 60 - - -   Body Fat % - - - 39.3 - - -   BMI 34.99 kg/m2 33.31 kg/m2 33.6 kg/m2 - 32.97 kg/m2 - 33.12 kg/m2         Have you received any other medical care this week? no  If yes, where and for what? Have you had any change in your medications since your last visit? no  If yes what? Did you have any problems adhering to the program last week? yes  If yes, please explain: hungry      Eating Habits Over Last Week:  Did you take in 64 oz of non-caloric fluids?  no     Did you consume your 4 meal replacements each day? no       Physical Activity Over the Past Week:    Aerobic exercise: 90 min  Resistance exercise: 30 min workouts / week

## 2018-01-26 ENCOUNTER — CLINICAL SUPPORT (OUTPATIENT)
Dept: FAMILY MEDICINE CLINIC | Age: 48
End: 2018-01-26

## 2018-01-26 VITALS
BODY MASS INDEX: 35.68 KG/M2 | SYSTOLIC BLOOD PRESSURE: 126 MMHG | WEIGHT: 222 LBS | HEIGHT: 66 IN | DIASTOLIC BLOOD PRESSURE: 85 MMHG | HEART RATE: 68 BPM

## 2018-01-26 DIAGNOSIS — E66.9 OBESITY (BMI 30-39.9): Primary | ICD-10-CM

## 2018-01-26 NOTE — PROGRESS NOTES
Progress Note: Weekly Education Class in the [facility]   Is there anything that you or the patient needs to let the [address] [facility] Physician know about? no  Over the past week, have you experienced any side-effects? no    Cricket Alvarado is a 50 y.o. female who is enrolled in [facility]    Visit Vitals    /85 (BP 1 Location: Right arm, BP Patient Position: Sitting)    Pulse 68    Ht 5' 6\" (1.676 m)    Wt 222 lb (100.7 kg)    BMI 35.83 kg/m2     Weight Metrics 1/26/2018 12/29/2017 12/29/2017 12/15/2017 12/8/2017 11/22/2017 11/22/2017   Weight 222 lb - 216 lb 12.8 oz 206 lb 6.4 oz 208 lb 3.2 oz - 204 lb 4.8 oz   Waist Measure Inches 35 34 - 33 32 32 -   Exercise Mins/week - - - - - 60 -   Body Fat % - - - - - 39.3 -   BMI 35.83 kg/m2 - 34.99 kg/m2 33.31 kg/m2 33.6 kg/m2 - 32.97 kg/m2         Have you received any other medical care this week? no  If yes, where and for what? Have you had any change in your medications since your last visit? no  If yes what? Did you have any problems adhering to the program last week? no  If yes, please explain:       Eating Habits Over Last Week:  Did you take in 64 oz of non-caloric fluids?  no     Did you consume your 4 meal replacements each day? no       Physical Activity Over the Past Week:    Aerobic exercise: 30 min  Resistance exercise: 60 min workouts / week

## 2018-02-01 ENCOUNTER — OFFICE VISIT (OUTPATIENT)
Dept: INTERNAL MEDICINE CLINIC | Age: 48
End: 2018-02-01

## 2018-02-01 VITALS
RESPIRATION RATE: 14 BRPM | SYSTOLIC BLOOD PRESSURE: 112 MMHG | TEMPERATURE: 98 F | DIASTOLIC BLOOD PRESSURE: 83 MMHG | WEIGHT: 214.7 LBS | BODY MASS INDEX: 34.51 KG/M2 | HEIGHT: 66 IN | HEART RATE: 70 BPM | OXYGEN SATURATION: 97 %

## 2018-02-01 DIAGNOSIS — E66.9 OBESITY (BMI 30-39.9): Primary | ICD-10-CM

## 2018-02-01 DIAGNOSIS — I10 ESSENTIAL HYPERTENSION WITH GOAL BLOOD PRESSURE LESS THAN 140/90: ICD-10-CM

## 2018-02-01 RX ORDER — METRONIDAZOLE 500 MG/1
TABLET ORAL
Refills: 6 | COMMUNITY
Start: 2018-01-06 | End: 2018-02-01 | Stop reason: ALTCHOICE

## 2018-02-01 RX ORDER — CLOBETASOL PROPIONATE 0.5 MG/G
OINTMENT TOPICAL
Refills: 2 | COMMUNITY
Start: 2017-12-30 | End: 2018-07-03 | Stop reason: ALTCHOICE

## 2018-02-01 NOTE — PROGRESS NOTES
[facility] Progress Note:   F/up Physician Visit    CC: Obesity      Itzel Lewis is a 50 y.o. female who is here for her  f/up physician visit for the VLCD Program. Kike Collins has completed 28 weeks of the program to date. 10 lbs weight gain since last visit. Lowest weight on program 184 lbs. Pt feels she is not compliant enough to stick with VLCD or LCD. 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 Starting weight: 206 lbs  Current weight: 214 lbs      Goal weight: 150 lbs      Most recent EK2016 at 206 lbs    Weight Metrics 2018 2018 2018 2017 2017 12/15/2017 2017   Weight - 214 lb 11.2 oz 222 lb - 216 lb 12.8 oz 206 lb 6.4 oz 208 lb 3.2 oz   Waist Measure Inches 34 - 35 34 - 33 32   Exercise Mins/week 30 - - - - - -   Body Fat % 40.3 - - - - - -   BMI - 34.65 kg/m2 35.83 kg/m2 - 34.99 kg/m2 33.31 kg/m2 33.6 kg/m2         Current Outpatient Prescriptions   Medication Sig Dispense Refill    clobetasol (TEMOVATE) 0.05 % ointment APPLY TO SCALP TWICE A DAY X 2 WEEKS, THEN TWICE A DAY EVERY OTHER DAY  2    ergocalciferol (ERGOCALCIFEROL) 50,000 unit capsule TAKE 1 CAP BY MOUTH 1 TIME A WEEK X 8 WEEKS THEN TAKE VIT D OTC @ 2000U DAILY  0    cholecalciferol, vitamin D3, (VITAMIN D3) 2,000 unit tab Take 5,000 Units by mouth daily.  garlic 7,288 mg cap Take 1,000 mg by mouth daily.  losartan (COZAAR) 25 mg tablet Take 25 mg by mouth daily.  hydrochlorothiazide (HYDRODIURIL) 25 mg tablet Take 25 mg by mouth daily. Indications: HYPERTENSION  2    iron-vitamin C 65 mg iron- 125 mg TbEC Take  mg by mouth daily.  biotin 2,500 mcg Tab Take 2,500 mcg by mouth daily.  MULTIVITAMIN WITH MINERALS (ONE DAILY COMPLETE PO) Take  by mouth daily. Participation   Did you attend clinic and class last week?  yes    Review of Systems  Since your last visit, have you experienced any complications? no  If yes, please list:     No CP, SOB, palpitations, lightheadedness, dizziness, constipation. Positives highlight in BOLD. Are you taking an appetite suppressant? no  If so, is there any Chest Pain, Palpitations or Dizziness? BP Readings from Last 10 Encounters:   02/01/18 112/83   01/26/18 126/85   12/29/17 132/86   12/15/17 (!) 135/93   12/08/17 (!) 150/99   11/22/17 (!) 136/98   11/17/17 129/88   11/10/17 123/88   11/03/17 125/87   10/19/17 (!) 130/92         Have you received any other medical care this week? no  If yes, where and for what? Have you discontinued or changed any medicine or dose of your medicine since your last visit? no  If yes, where and for what? Diet  How many ounces of calorie-free liquids did you consume each day? 32-80 oz    How many meal replacements did you take each day? 1-4    Did you have any problems adhering to the program?  yes If yes, please explain: eating outside foods      Exercise  Aerobic exercise: 30 min  Resistance exercise: 30 min workouts / week  Any discomfort?  no     If yes, where? Review of Systems  Complete ROS negative except where noted above    Objective  Visit Vitals    /83 (BP 1 Location: Left arm, BP Patient Position: Sitting)    Pulse 70    Temp 98 °F (36.7 °C) (Oral)    Resp 14    Ht 5' 6\" (1.676 m)    Wt 214 lb 11.2 oz (97.4 kg)    LMP 12/31/2017    SpO2 97%    BMI 34.65 kg/m2     Patient's last menstrual period was 12/31/2017.     PHQ over the last two weeks 11/17/2017   Little interest or pleasure in doing things Not at all   Feeling down, depressed or hopeless Not at all   Total Score PHQ 2 0         Waist Circumference: I personally reviewed patient's Weight Management Doc Flowsheet  Neck Circumference: I personally reviewed patient's Weight Management Doc Flowsheet  Percent Body Fat: I personally reviewed patient's Weight Management Doc Flowsheet    Physical Exam  Appearance: well appearing, obese, A&O, NAD  HEENT:  NC/AT, PERRL, No scleral icterus  Heart:  RRR without M/R/G  Lungs:  CTAB, no rhonchi, rales, or wheezes with good air exchange   Ext:  No LE Edema    Assessment / Plan    Encounter Diagnoses   Name Primary?  Obesity (BMI 30-39. 9) Yes    Essential hypertension with goal blood pressure less than 140/90        1. Weight management poorly controlled   Progress was reviewed with patient    2. Labs    Latest results reviewed with patient   Lab slip given to pt for f/up HDL labs    3. Diet regimen   # of meal replacements prescribed: 1-2 based on maintenance plan   If modified LCD-nutritional guidelines:    Monthly Goal   As below    Medical monitoring schedule:   Weekly BP/Weight checks   Monthly provider appointments  I have reviewed/discussed the above normal BMI with the patient. I have recommended the following interventions: dietary management education, guidance, and counseling . . Ms. Ferdinand Ceja has a reminder for a \"due or due soon\" health maintenance. I have asked that she contact her primary care provider for follow-up on this health maintenance. Patient Instructions     Health Maintenance Due   Topic Date Due    DTaP/Tdap/Td series (1 - Tdap) 01/05/1991    Influenza Age 5 to Adult  08/01/2017     Expect a call dietician and bariatrics about options. Maintenance compliance is 1 monthly class/porvider visit every 3 mo. Body Mass Index: Care Instructions  Your Care Instructions    Body mass index (BMI) can help you see if your weight is raising your risk for health problems. It uses a formula to compare how much you weigh with how tall you are. · A BMI lower than 18.5 is considered underweight. · A BMI between 18.5 and 24.9 is considered healthy. · A BMI between 25 and 29.9 is considered overweight. A BMI of 30 or higher is considered obese.   If your BMI is in the normal range, it means that you have a lower risk for weight-related health problems. If your BMI is in the overweight or obese range, you may be at increased risk for weight-related health problems, such as high blood pressure, heart disease, stroke, arthritis or joint pain, and diabetes. If your BMI is in the underweight range, you may be at increased risk for health problems such as fatigue, lower protection (immunity) against illness, muscle loss, bone loss, hair loss, and hormone problems. BMI is just one measure of your risk for weight-related health problems. You may be at higher risk for health problems if you are not active, you eat an unhealthy diet, or you drink too much alcohol or use tobacco products. Follow-up care is a key part of your treatment and safety. Be sure to make and go to all appointments, and call your doctor if you are having problems. It's also a good idea to know your test results and keep a list of the medicines you take. How can you care for yourself at home? · Practice healthy eating habits. This includes eating plenty of fruits, vegetables, whole grains, lean protein, and low-fat dairy. · If your doctor recommends it, get more exercise. Walking is a good choice. Bit by bit, increase the amount you walk every day. Try for at least 30 minutes on most days of the week. · Do not smoke. Smoking can increase your risk for health problems. If you need help quitting, talk to your doctor about stop-smoking programs and medicines. These can increase your chances of quitting for good. · Limit alcohol to 2 drinks a day for men and 1 drink a day for women. Too much alcohol can cause health problems. If you have a BMI higher than 25  · Your doctor may do other tests to check your risk for weight-related health problems. This may include measuring the distance around your waist. A waist measurement of more than 40 inches in men or 35 inches in women can increase the risk of weight-related health problems.   · Talk with your doctor about steps you can take to stay healthy or improve your health. You may need to make lifestyle changes to lose weight and stay healthy, such as changing your diet and getting regular exercise. If you have a BMI lower than 18.5  · Your doctor may do other tests to check your risk for health problems. · Talk with your doctor about steps you can take to stay healthy or improve your health. You may need to make lifestyle changes to gain or maintain weight and stay healthy, such as getting more healthy foods in your diet and doing exercises to build muscle. Where can you learn more? Go to http://kathy-shamar.info/. Enter S176 in the search box to learn more about \"Body Mass Index: Care Instructions. \"  Current as of: October 13, 2016  Content Version: 11.4  © 7052-6812 [employer]. Care instructions adapted under license by [employer] (which disclaims liability or warranty for this information). If you have questions about a medical condition or this instruction, always ask your healthcare professional. [place] [address] any warranty or liability for your use of this information. Follow-up Disposition:  Return in about 3 months (around 5/1/2018). 15 minutes of the 25 minutes face to face time with [place] consisted of counseling & coordinating and/or discussing treatment plans in reference to her The primary encounter diagnosis was Obesity (BMI 30-39.9). A diagnosis of Essential hypertension with goal blood pressure less than 140/90 was also pertinent to this visit. The patient is to follow up as scheduled and will report to the ED or the office if symptoms change or increase. The patient has voiced understanding and will comply.

## 2018-02-01 NOTE — PROGRESS NOTES
Chief Complaint   Patient presents with    Weight Management     1. Have you been to the ER, urgent care clinic since your last visit? Hospitalized since your last visit? No    2. Have you seen or consulted any other health care providers outside of the [address] [place] [address] since your last visit? Include any pap smears or colon screening.  No

## 2018-02-01 NOTE — MR AVS SNAPSHOT
[address] [facility] 
 
 
 [address], [address] [facility] 
[phone] Patient: Yenny Valiente MRN: [id number] Visit Information Date & Time Provider Department Dept. Phone Encounter #  
 2/1/2018  9:45 AM Lary Murray NP [facility]  Follow-up Instructions Return in about 3 months (around 5/1/2018). Your Appointments 4/12/2018 10:00 AM  
New Patient with Marquis Jose MD  
[facility]) Appt Note: nppe [address], [address] [place] [address] [place]  
  
   
 [address], [address] Upcoming Health Maintenance Date Due DTaP/Tdap/Td series (1 - Tdap) 1/5/1991 Influenza Age 5 to Adult 8/1/2017 PAP AKA CERVICAL CYTOLOGY 12/6/2019 Allergies as of 2/1/2018  Review Complete On: 2/1/2018 By: Lary Murray NP Severity Noted Reaction Type Reactions Percocet [Oxycodone-acetaminophen]  04/07/2015    Itching Current Immunizations  Never Reviewed No immunizations on file. Not reviewed this visit You Were Diagnosed With   
  
 Codes Comments Obesity (BMI 30-39.9)    -  Primary ICD-10-CM: J79.6 ICD-9-CM: 278.00 Essential hypertension with goal blood pressure less than 140/90     ICD-10-CM: I10 
ICD-9-CM: 401.9 Vitals BP Pulse Temp Resp Height(growth percentile) Weight(growth percentile) 112/83 (BP 1 Location: Left arm, BP Patient Position: Sitting) 70 98 °F (36.7 °C) (Oral) 14 5' 6\" (1.676 m) 214 lb 11.2 oz (97.4 kg) LMP SpO2 BMI OB Status Smoking Status 12/31/2017 97% 34.65 kg/m2 Having regular periods Former Smoker BMI and BSA Data Body Mass Index Body Surface Area  
 34.65 kg/m 2 2.13 m 2 Your Updated Medication List  
  
   
This list is accurate as of: 2/1/18 10:28 AM.  Always use your most recent med list.  
  
  
  
  
 biotin 2,500 mcg Tab Take 2,500 mcg by mouth daily. clobetasol 0.05 % ointment Commonly known as:  TEMOVATE  
APPLY TO SCALP TWICE A DAY X 2 WEEKS, THEN TWICE A DAY EVERY OTHER DAY  
  
 ergocalciferol 50,000 unit capsule Commonly known as:  ERGOCALCIFEROL  
TAKE 1 CAP BY MOUTH 1 TIME A WEEK X 8 WEEKS THEN TAKE VIT D OTC @ 0602L DAILY  
  
 garlic 3,538 mg Cap Take 1,000 mg by mouth daily. hydroCHLOROthiazide 25 mg tablet Commonly known as:  HYDRODIURIL Take 25 mg by mouth daily. Indications: HYPERTENSION  
  
 iron,carbonyl-vitamin C 65 mg iron- 125 mg Tbec Take  mg by mouth daily. losartan 25 mg tablet Commonly known as:  COZAAR Take 25 mg by mouth daily. ONE DAILY COMPLETE PO Take  by mouth daily. VITAMIN D3 2,000 unit Tab Generic drug:  cholecalciferol (vitamin D3) Take 5,000 Units by mouth daily. Follow-up Instructions Return in about 3 months (around 5/1/2018). Patient Instructions Health Maintenance Due Topic Date Due  
 DTaP/Tdap/Td series (1 - Tdap) 01/05/1991  Influenza Age 5 to Adult  08/01/2017 Expect a call dietician and bariatrics about options. Maintenance compliance is 1 monthly class/porvider visit every 3 mo. Body Mass Index: Care Instructions Your Care Instructions Body mass index (BMI) can help you see if your weight is raising your risk for health problems. It uses a formula to compare how much you weigh with how tall you are. · A BMI lower than 18.5 is considered underweight. · A BMI between 18.5 and 24.9 is considered healthy. · A BMI between 25 and 29.9 is considered overweight. A BMI of 30 or higher is considered obese. If your BMI is in the normal range, it means that you have a lower risk for weight-related health problems.  If your BMI is in the overweight or obese range, you may be at increased risk for weight-related health problems, such as high blood pressure, heart disease, stroke, arthritis or joint pain, and diabetes. If your BMI is in the underweight range, you may be at increased risk for health problems such as fatigue, lower protection (immunity) against illness, muscle loss, bone loss, hair loss, and hormone problems. BMI is just one measure of your risk for weight-related health problems. You may be at higher risk for health problems if you are not active, you eat an unhealthy diet, or you drink too much alcohol or use tobacco products. Follow-up care is a key part of your treatment and safety. Be sure to make and go to all appointments, and call your doctor if you are having problems. It's also a good idea to know your test results and keep a list of the medicines you take. How can you care for yourself at home? · Practice healthy eating habits. This includes eating plenty of fruits, vegetables, whole grains, lean protein, and low-fat dairy. · If your doctor recommends it, get more exercise. Walking is a good choice. Bit by bit, increase the amount you walk every day. Try for at least 30 minutes on most days of the week. · Do not smoke. Smoking can increase your risk for health problems. If you need help quitting, talk to your doctor about stop-smoking programs and medicines. These can increase your chances of quitting for good. · Limit alcohol to 2 drinks a day for men and 1 drink a day for women. Too much alcohol can cause health problems. If you have a BMI higher than 25 · Your doctor may do other tests to check your risk for weight-related health problems. This may include measuring the distance around your waist. A waist measurement of more than 40 inches in men or 35 inches in women can increase the risk of weight-related health problems. · Talk with your doctor about steps you can take to stay healthy or improve your health.  You may need to make lifestyle changes to lose weight and stay healthy, such as changing your diet and getting regular exercise. If you have a BMI lower than 18.5 · Your doctor may do other tests to check your risk for health problems. · Talk with your doctor about steps you can take to stay healthy or improve your health. You may need to make lifestyle changes to gain or maintain weight and stay healthy, such as getting more healthy foods in your diet and doing exercises to build muscle. Where can you learn more? Go to http://kathy-shamar.info/. Enter S176 in the search box to learn more about \"Body Mass Index: Care Instructions. \" Current as of: October 13, 2016 Content Version: 11.4 © 6943-4047 [employer]intric. Care instructions adapted under license by [employer] (which disclaims liability or warranty for this information). If you have questions about a medical condition or this instruction, always ask your healthcare professional. [place] [address] any warranty or liability for your use of this information. Introducing [address]! [facility] introduces SwarmBuild patient portal. Now you can access parts of your medical record, email your doctor's office, and request medication refills online. 1. In your internet browser, go to [email or web address] 2. Click on the First Time User? Click Here link in the Sign In box. You will see the New Member Sign Up page. 3. Enter your SwarmBuild Access Code exactly as it appears below. You will not need to use this code after youve completed the sign-up process. If you do not sign up before the expiration date, you must request a new code. · SwarmBuild Access Code: [email or web address] Expires: 4/22/2018  4:13 PM 
 
4. Enter the last four digits of your Social Security Number (xxxx) and Date of Birth (mm/dd/yyyy) as indicated and click Submit. You will be taken to the next sign-up page. 5. Create a Ethical Deal ID. This will be your Ethical Deal login ID and cannot be changed, so think of one that is secure and easy to remember. 6. Create a Ethical Deal password. You can change your password at any time. 7. Enter your Password Reset Question and Answer. This can be used at a later time if you forget your password. 8. Enter your e-mail address. You will receive e-mail notification when new information is available in [address] 9. Click Sign Up. You can now view and download portions of your medical record. 10. Click the Download Summary menu link to download a portable copy of your medical information. If you have questions, please visit the Frequently Asked Questions section of the Ethical Deal website. Remember, Ethical Deal is NOT to be used for urgent needs. For medical emergencies, dial 911. Now available from your iPhone and Android! Please provide this summary of care documentation to your next provider. Your primary care clinician is listed as Jenny Mesa. If you have any questions after today's visit, please call [phone].

## 2018-02-01 NOTE — PATIENT INSTRUCTIONS
Health Maintenance Due   Topic Date Due    DTaP/Tdap/Td series (1 - Tdap) 01/05/1991    Influenza Age 5 to Adult  08/01/2017     Expect a call dietician and bariatrics about options. Maintenance compliance is 1 monthly class/porvider visit every 3 mo. Body Mass Index: Care Instructions  Your Care Instructions    Body mass index (BMI) can help you see if your weight is raising your risk for health problems. It uses a formula to compare how much you weigh with how tall you are. · A BMI lower than 18.5 is considered underweight. · A BMI between 18.5 and 24.9 is considered healthy. · A BMI between 25 and 29.9 is considered overweight. A BMI of 30 or higher is considered obese. If your BMI is in the normal range, it means that you have a lower risk for weight-related health problems. If your BMI is in the overweight or obese range, you may be at increased risk for weight-related health problems, such as high blood pressure, heart disease, stroke, arthritis or joint pain, and diabetes. If your BMI is in the underweight range, you may be at increased risk for health problems such as fatigue, lower protection (immunity) against illness, muscle loss, bone loss, hair loss, and hormone problems. BMI is just one measure of your risk for weight-related health problems. You may be at higher risk for health problems if you are not active, you eat an unhealthy diet, or you drink too much alcohol or use tobacco products. Follow-up care is a key part of your treatment and safety. Be sure to make and go to all appointments, and call your doctor if you are having problems. It's also a good idea to know your test results and keep a list of the medicines you take. How can you care for yourself at home? · Practice healthy eating habits. This includes eating plenty of fruits, vegetables, whole grains, lean protein, and low-fat dairy. · If your doctor recommends it, get more exercise. Walking is a good choice.  Bit by bit, increase the amount you walk every day. Try for at least 30 minutes on most days of the week. · Do not smoke. Smoking can increase your risk for health problems. If you need help quitting, talk to your doctor about stop-smoking programs and medicines. These can increase your chances of quitting for good. · Limit alcohol to 2 drinks a day for men and 1 drink a day for women. Too much alcohol can cause health problems. If you have a BMI higher than 25  · Your doctor may do other tests to check your risk for weight-related health problems. This may include measuring the distance around your waist. A waist measurement of more than 40 inches in men or 35 inches in women can increase the risk of weight-related health problems. · Talk with your doctor about steps you can take to stay healthy or improve your health. You may need to make lifestyle changes to lose weight and stay healthy, such as changing your diet and getting regular exercise. If you have a BMI lower than 18.5  · Your doctor may do other tests to check your risk for health problems. · Talk with your doctor about steps you can take to stay healthy or improve your health. You may need to make lifestyle changes to gain or maintain weight and stay healthy, such as getting more healthy foods in your diet and doing exercises to build muscle. Where can you learn more? Go to http://kathy-shamar.info/. Enter S176 in the search box to learn more about \"Body Mass Index: Care Instructions. \"  Current as of: October 13, 2016  Content Version: 11.4  © 1709-5493 [employer]. Care instructions adapted under license by BoxCast (which disclaims liability or warranty for this information). If you have questions about a medical condition or this instruction, always ask your healthcare professional. [place] [address] any warranty or liability for your use of this information.

## 2018-02-06 ENCOUNTER — OFFICE VISIT (OUTPATIENT)
Dept: SURGERY | Age: 48
End: 2018-02-06

## 2018-02-06 VITALS
HEART RATE: 75 BPM | HEIGHT: 66 IN | WEIGHT: 218 LBS | BODY MASS INDEX: 35.03 KG/M2 | TEMPERATURE: 98.5 F | DIASTOLIC BLOOD PRESSURE: 86 MMHG | SYSTOLIC BLOOD PRESSURE: 136 MMHG | RESPIRATION RATE: 18 BRPM

## 2018-02-06 DIAGNOSIS — I10 ESSENTIAL HYPERTENSION: ICD-10-CM

## 2018-02-06 DIAGNOSIS — E66.01 MORBID OBESITY (HCC): Primary | ICD-10-CM

## 2018-02-06 RX ORDER — GUAIFENESIN 100 MG/5ML
81 LIQUID (ML) ORAL DAILY
COMMUNITY
End: 2019-06-05 | Stop reason: ALTCHOICE

## 2018-02-06 RX ORDER — FOLIC ACID/MULTIVIT,IRON,MINER 0.4MG-18MG
TABLET ORAL DAILY
COMMUNITY
End: 2018-04-12 | Stop reason: ALTCHOICE

## 2018-02-06 NOTE — PROGRESS NOTES
Initial Consultation for Bariatric Surgery Template (Gastric Sleeve)    Elie Olivares is a 50 y.o. female who comes into the office today for initial consultation for the surgical options for the treatment of morbid obesity. The patient initially identified obesity at the age of 32 and at age 25 weighed 128 lbs. She has tried a variety of unsupervised weight-loss attempts including Weight Watchers, registered dietician, Blenda Lash and Nutrisystem, but has yet to meet with lasting success. Maximum weight lost on a diet is about 80 lbs, but that the weight loss always seems to return. Today, the patient is  Height: 5' 6\" (167.6 cm) tall, Weight: 98.9 kg (218 lb) lbs for a Body mass index is 35.19 kg/(m^2). It is due to the patient's severe obesity, which is further complicated by hypertension  that the patient is now seeking out bariatric surgery, specifically, the gastric sleeve. Past Medical History:   Diagnosis Date    Anemia     Asthma     childhood asthma    Hypertension     Menorrhalgia 2015    Sarcoidosis     no problems managed by PCP       Past Surgical History:   Procedure Laterality Date    HX ABDOMINOPLASTY      HX  SECTION      x 4    HX HERNIA REPAIR      umbilical    HX LIPOMA RESECTION      lower back    HX TUBAL LIGATION      HX WISDOM TEETH EXTRACTION      x2    MYOMECTOMY 1-4,W/TOT 250GMS/<,ABD APPRCH         Current Outpatient Prescriptions   Medication Sig Dispense Refill    aspirin 81 mg chewable tablet Take 81 mg by mouth daily.  krill-omega-3-dha-epa-lipids 070-58-47-18 mg cap Take  by mouth.  clobetasol (TEMOVATE) 0.05 % ointment APPLY TO SCALP TWICE A DAY X 2 WEEKS, THEN TWICE A DAY EVERY OTHER DAY  2    ergocalciferol (ERGOCALCIFEROL) 50,000 unit capsule TAKE 1 CAP BY MOUTH 1 TIME A WEEK X 8 WEEKS THEN TAKE VIT D OTC @ 2000U DAILY  0    cholecalciferol, vitamin D3, (VITAMIN D3) 2,000 unit tab Take 5,000 Units by mouth daily.       garlic 6,245 mg cap Take 1,000 mg by mouth daily.  losartan (COZAAR) 25 mg tablet Take 25 mg by mouth daily.  hydrochlorothiazide (HYDRODIURIL) 25 mg tablet Take 25 mg by mouth daily. Indications: HYPERTENSION  2    iron-vitamin C 65 mg iron- 125 mg TbEC Take  mg by mouth daily.  biotin 2,500 mcg Tab Take 2,500 mcg by mouth daily.  MULTIVITAMIN WITH MINERALS (ONE DAILY COMPLETE PO) Take  by mouth daily.          Allergies   Allergen Reactions    Percocet [Oxycodone-Acetaminophen] Itching       Social History   Substance Use Topics    Smoking status: Former Smoker     Packs/day: 0.25     Types: Cigarettes     Quit date: 3/15/2017    Smokeless tobacco: Never Used    Alcohol use 0.0 oz/week     0 Standard drinks or equivalent per week      Comment: once every few months       Family History   Problem Relation Age of Onset    Hypertension Mother     Diabetes Father     No Known Problems Maternal Grandmother     Alcohol abuse Maternal Grandfather     Cancer Paternal Grandmother      breast    Alcohol abuse Paternal Grandfather     No Known Problems Daughter     No Known Problems Son        Family Status   Relation Status    Mother Alive    overweight    Father Alive    overweight    Maternal Grandmother Alive    Maternal Grandfather     Paternal Grandmother     Paternal Grandfather     Daughter Alive    Son Alive       Review of Systems:  Positive in BOLD    CONST: Fever, weight loss, fatigue or chills  GI: Nausea, vomiting, abdominal pain, change in bowel habits, hematochezia, melena, and GERD   INTEG: Dermatitis, abnormal moles  HEENT: Recent changes in vision, vertigo, epistaxis, dysphagia and hoarseness  CV: Chest pain, palpitations, HTN, edema and varicosities  RESP: Cough, shortness of breath, wheezing, hemoptysis, snoring and reactive airway disease  : Hematuria, dysuria, frequency, urgency, nocturia and stress urinary incontinence   MS: Weakness, joint pain and arthritis  ENDO: Diabetes, thyroid disease, polyuria, polydipsia, polyphagia, poor wound healing, heat intolerance, cold intolerance  LYMPH/HEME: Anemia, bruising and history of blood transfusions  NEURO: Dizziness, headache, fainting, seizures and stroke  PSYCH: Anxiety and depression      Physical Exam    Visit Vitals    /86    Pulse 75    Temp 98.5 °F (36.9 °C)    Resp 18    Ht 5' 6\" (1.676 m)    Wt 98.9 kg (218 lb)    BMI 35.19 kg/m2       Pre op weight: 218  EBW: 81  Wt loss to date: 0       General: 50 y.o.) female in no acute distress. Morbidly obese in hips and thighs - gynecoid pattern  HEENT: Normocephalic, atraumatic, Pupils equal and reactive, nasopharynx clear, oropharynx clear and moist without lesions  NECK: Supple, no lymphadenopathy, thyromegaly, carotid bruits or jugular venous distension. trachea midline  RESP: Clear to auscultation bilaterally, no wheezes, rhonchi, or rales, normal respiratory excursion  CV: Regular rate and rhythm, no murmurs, rubs or gallops. 3+/4 pulses in bilateral dorsalis pedis and posterior tibialis. No distal edema or varicosities. ABD: Soft, nontender, nondistended, normoactive bowel sounds, no hernias, no hepatosplenomegaly, easily palpable costal margins, gynecoid distribution, healed abdominoplasty incision  Extremities: Warm, well perfused, no tenderness or swelling, normal gait/station  Neuro: Sensation and strength grossly intact and symmetrical  Psych: Alert and oriented to person, place, and time. Impression:    Yenny Valiente is a 50 y.o. female who is suffering from morbid obesity with a BMI of 35  and comorbidities including hypertension  who would benefit from bariatric surgery. We have had an extensive discussion with regard to the risks, benefits and likely outcomes of the operation. We've discussed the restrictive and malabsorptive nature of the gastric bypass and compared and contrasted with the sleeve gastrectomy.   The patient understands the likelihood of losing approximately 80% of their excess weight in 12 to 18 months. She also understands the risks including but not limited to bleeding, infection, need for reoperation, ulcers, leaks and strictures, bowel obstruction secondary to adhesions and internal hernias, DVT, PE, heart attack, stroke, and death. Patient also understands risks of inadequate weight loss, excess weight loss, vitamin insufficiency, protein malnutrition, excess skin, and loss of hair. We have reviewed the components of a successful postoperative course including requirement for a high protein, low carbohydrate diet, 60 oz a day of zero calorie liquids, daily vitamin supplementation, daily exercise, regular follow-up, and participation in support groups. At this time we will enroll the patient in our bariatric program, undertake routine laboratory evaluation, chest X-ray, EKG, possible UGI and evaluation by  nutritionist as well as psychologist and pending their satisfactory completion of the preop evaluation, plan to perform a sleeve gastrectomy.

## 2018-02-06 NOTE — LETTER
2/6/2018 10:36 AM 
 
Patient:  Anneliese Edwards YOB: 1970 Date of Visit: 2/6/2018 No Recipients Dear No Recipients, Thank you for referring Ms. Rod Sparks to [place] [id number] for evaluation and treatment. Below are the relevant portions of my assessment and plan of care. Thank you very much for your referral of Ms. Rod Sparks. If you have questions, please do not hesitate to call me. I look forward to following Ms. Lam along with you and will keep you updated as to her progress.   
 
 
 
 
Sincerely, 
 
 
Chava Quezada MD

## 2018-02-06 NOTE — PROGRESS NOTES
Pt ID confirmed    Weight Loss Metrics 2/6/2018 2/6/2018 2/1/2018 1/26/2018 12/29/2017 12/15/2017 12/8/2017   Pre op / Initial Wt 218 - - - - - -   Today's Wt - 218 lb 214 lb 11.2 oz 222 lb 216 lb 12.8 oz 206 lb 6.4 oz 208 lb 3.2 oz   BMI - 35.19 kg/m2 34.65 kg/m2 35.83 kg/m2 34.99 kg/m2 33.31 kg/m2 33.6 kg/m2   Ideal Body Wt 137 - - - - - -   Excess Body Wt 81 - - - - - -   Goal Wt 153 - - - - - -   Wt loss to date 0 - - - - - -   % Wt Loss 0 - - - - - -   80% EBW 64.8 - - - - - -       Body mass index is 35.19 kg/(m^2).

## 2018-03-01 ENCOUNTER — DOCUMENTATION ONLY (OUTPATIENT)
Dept: BARIATRICS/WEIGHT MGMT | Age: 48
End: 2018-03-01

## 2018-03-01 ENCOUNTER — HOSPITAL ENCOUNTER (OUTPATIENT)
Dept: BARIATRICS/WEIGHT MGMT | Age: 48
Discharge: HOME | End: 2018-03-01

## 2018-03-01 NOTE — PROGRESS NOTES
3/1/18:  Patient has been in the Medically Supervised Program.  She was originally set up to see 1-1 to transition off the program and go into maintenance. In the meantime, she saw Dr. Marie Lanier for a surgical consult, which required a 4 month WLT with DANIEL. Patient came to her appointment with me today stating she is unsure if she wants to pursue surgery and she doesn't wish to do the weight loss trial at this time. We proceed with our appointment to transition her to maintenance, which was originally scheduled. Patient states she will call back if she changes her mind and wants to have surgery.     Estela Pickens MS RD

## 2018-03-01 NOTE — PROGRESS NOTES
Medically Supervised Weight Loss Program   Transition to Maintenance      Patients Name: Itzel Lewis      :  1970          Patient has been in the medically supervised program for ~ 2 years    Height: 5 f 6      Starting weight: 205 Current Weight: 223   Goal Weight:            Patient has gained 18 pounds. Current BMI:  36.0    Starting BMI:     Reason for Visit: Transition to adapting    Summary:   Patient is currently on 0 New Direction products per day. Food outside of meal replacement includes:  Patient states for breakfast she will an white egg omelet with shredded cheese. She states she may also do cottage cheese and fruit. Some mornings she may have some oatmeal or turkey silva. Lunch:  Patient may have a salad with Lloyd. She states she tends to watch her dressings. Dinner:  Patient states that dinner is when things go wrong. She states she may have a sweet potato. She states last night she had a roast with some carrots. She states she may have baked chicken or baked fish. Patient states she doesn't do a lot of snacking between meals. She states her portions are out of control with dinner and she finds she is going back for seconds. Patient's current activity level is:  Not doing anything for activity. Patient states she can commit to walking. Patient is encouraged to keep their daily protein intake around 80 grams per day and keep their daily carbohydrate intake under 100 grams per day. Patient had been educated on the exchange list at least one time during the reducing phase. I spent some time reviewing the exchange list during today's visit. Patient has been educated on the adapting phase and understands the 5 week transition back to food.       Patient was enrolled in the S.T.A.R. Maintenance Program.  Patient understands they are required to attend at least one class per month in order to be an active member in the maintenance program and are required to see the physician 1 x every 3 months. Patient understands they are allowed to purchase 8 boxes of food per month    Patient was given a list of protein shakes and bars that I feel are close in comparison, in terms of protein, calories, and carbohydrates to what her New Direction supplements provide. Safety Weight Zones    Patient's S.T. A. Rting Weight is 223 pounds (goal weight/starting maintenance weight). Green Safety Zone (No more than 2.5% over your S.T. A. Rting weight). - Your GREEN Safety Zone weight is: 225.5    Yellow Caution Zone (2.6%-5% over S.T. A. R ting weight)  - Your YELLOW Caution Zone Weight is: 225.6-228    Red Correction Zone (Weekly average weight is greater than 10% over the S.T. A. R ting weight)  Your RED Correction Zone weight is: 245    Questions that patient has includes:     WOW Movement or Improvement in overall health:       Patient was provided my E-mail address and phone number and may contact me with any nutrition questions. Patient states she has gained weight while on the program.  She doesn't like the new sweetener. She states she quit smoking in April 2017 when she quit smoking. She is currently off all New Direction products. She looked in surgery, but has decided to put that on hold, so we proceed with the appointment for maintenance.     Yumiko Garcia Axel 87 RD  3/1/18

## 2018-04-11 PROBLEM — D64.9 ANEMIA: Status: ACTIVE | Noted: 2018-04-11

## 2018-04-12 ENCOUNTER — HOSPITAL ENCOUNTER (OUTPATIENT)
Dept: LAB | Age: 48
Discharge: HOME | End: 2018-04-12
Payer: COMMERCIAL

## 2018-04-12 ENCOUNTER — OFFICE VISIT (OUTPATIENT)
Dept: INTERNAL MEDICINE CLINIC | Age: 48
End: 2018-04-12

## 2018-04-12 VITALS
RESPIRATION RATE: 16 BRPM | OXYGEN SATURATION: 98 % | BODY MASS INDEX: 35.52 KG/M2 | SYSTOLIC BLOOD PRESSURE: 115 MMHG | HEIGHT: 66 IN | HEART RATE: 72 BPM | WEIGHT: 221 LBS | DIASTOLIC BLOOD PRESSURE: 80 MMHG | TEMPERATURE: 98.1 F

## 2018-04-12 DIAGNOSIS — Z13.220 SCREENING FOR HYPERLIPIDEMIA: ICD-10-CM

## 2018-04-12 DIAGNOSIS — D86.9 SARCOIDOSIS: ICD-10-CM

## 2018-04-12 DIAGNOSIS — L68.0 HIRSUTISM: ICD-10-CM

## 2018-04-12 DIAGNOSIS — D64.9 ANEMIA, UNSPECIFIED TYPE: ICD-10-CM

## 2018-04-12 DIAGNOSIS — Z13.0 SCREENING FOR DEFICIENCY ANEMIA: ICD-10-CM

## 2018-04-12 DIAGNOSIS — I10 ESSENTIAL HYPERTENSION: Primary | ICD-10-CM

## 2018-04-12 DIAGNOSIS — E66.01 SEVERE OBESITY (BMI 35.0-39.9) WITH COMORBIDITY (HCC): ICD-10-CM

## 2018-04-12 DIAGNOSIS — L65.9 ALOPECIA: ICD-10-CM

## 2018-04-12 DIAGNOSIS — I10 ESSENTIAL HYPERTENSION: ICD-10-CM

## 2018-04-12 LAB
BASOPHILS # BLD: 0 K/UL (ref 0–0.06)
BASOPHILS NFR BLD: 0 % (ref 0–2)
CHOLEST SERPL-MCNC: 192 MG/DL
CRP SERPL-MCNC: 1.2 MG/DL (ref 0–0.3)
DIFFERENTIAL METHOD BLD: ABNORMAL
EOSINOPHIL # BLD: 0.1 K/UL (ref 0–0.4)
EOSINOPHIL NFR BLD: 2 % (ref 0–5)
ERYTHROCYTE [DISTWIDTH] IN BLOOD BY AUTOMATED COUNT: 16 % (ref 11.6–14.5)
FERRITIN SERPL-MCNC: 73 NG/ML (ref 8–388)
HBA1C MFR BLD: 5.1 % (ref 4.2–5.6)
HCT VFR BLD AUTO: 42.4 % (ref 35–45)
HDLC SERPL-MCNC: 86 MG/DL (ref 40–60)
HDLC SERPL: 2.2 {RATIO} (ref 0–5)
HGB BLD-MCNC: 13.6 G/DL (ref 12–16)
LDLC SERPL CALC-MCNC: 95.2 MG/DL (ref 0–100)
LIPID PROFILE,FLP: ABNORMAL
LYMPHOCYTES # BLD: 2.1 K/UL (ref 0.9–3.6)
LYMPHOCYTES NFR BLD: 35 % (ref 21–52)
MCH RBC QN AUTO: 26.3 PG (ref 24–34)
MCHC RBC AUTO-ENTMCNC: 32.1 G/DL (ref 31–37)
MCV RBC AUTO: 81.9 FL (ref 74–97)
MONOCYTES # BLD: 0.4 K/UL (ref 0.05–1.2)
MONOCYTES NFR BLD: 7 % (ref 3–10)
NEUTS SEG # BLD: 3.3 K/UL (ref 1.8–8)
NEUTS SEG NFR BLD: 56 % (ref 40–73)
PLATELET # BLD AUTO: 405 K/UL (ref 135–420)
PMV BLD AUTO: 10.2 FL (ref 9.2–11.8)
RBC # BLD AUTO: 5.18 M/UL (ref 4.2–5.3)
TRIGL SERPL-MCNC: 54 MG/DL (ref ?–150)
VLDLC SERPL CALC-MCNC: 10.8 MG/DL
WBC # BLD AUTO: 5.9 K/UL (ref 4.6–13.2)

## 2018-04-12 PROCEDURE — 83540 ASSAY OF IRON: CPT | Performed by: INTERNAL MEDICINE

## 2018-04-12 PROCEDURE — 84402 ASSAY OF FREE TESTOSTERONE: CPT | Performed by: INTERNAL MEDICINE

## 2018-04-12 PROCEDURE — 85025 COMPLETE CBC W/AUTO DIFF WBC: CPT | Performed by: INTERNAL MEDICINE

## 2018-04-12 PROCEDURE — 80061 LIPID PANEL: CPT | Performed by: INTERNAL MEDICINE

## 2018-04-12 PROCEDURE — 36415 COLL VENOUS BLD VENIPUNCTURE: CPT | Performed by: INTERNAL MEDICINE

## 2018-04-12 PROCEDURE — 82728 ASSAY OF FERRITIN: CPT | Performed by: INTERNAL MEDICINE

## 2018-04-12 PROCEDURE — 86140 C-REACTIVE PROTEIN: CPT | Performed by: INTERNAL MEDICINE

## 2018-04-12 PROCEDURE — 83036 HEMOGLOBIN GLYCOSYLATED A1C: CPT | Performed by: INTERNAL MEDICINE

## 2018-04-12 PROCEDURE — 86235 NUCLEAR ANTIGEN ANTIBODY: CPT | Performed by: INTERNAL MEDICINE

## 2018-04-12 RX ORDER — AMLODIPINE BESYLATE 5 MG/1
5 TABLET ORAL DAILY
Qty: 30 TAB | Refills: 1 | Status: SHIPPED | OUTPATIENT
Start: 2018-04-12 | End: 2018-05-17 | Stop reason: SDUPTHER

## 2018-04-12 NOTE — PROGRESS NOTES
Chief Complaint   Patient presents with   [facility].    Obesity     would like to discuss Bariatric Surgery options    Alopecia     hair loss as well as Facial Hair     Patient was given a copy of the Advanced Medical Directive form and understands to bring it in once completed. 1. Have you been to the ER, urgent care clinic since your last visit? Hospitalized since your last visit? No    2. Have you seen or consulted any other health care providers outside of the [facility] since your last visit? Include any pap smears or colon screening.  No

## 2018-04-12 NOTE — PATIENT INSTRUCTIONS
Patient was given a copy of the Advanced Medical Directive form and understands to bring it in once completed. Health Maintenance Due   Topic Date Due    DTaP/Tdap/Td series (1 - Tdap) 01/05/1991    Influenza Age 5 to Adult  08/01/2017          Body Mass Index: Care Instructions  Your Care Instructions    Body mass index (BMI) can help you see if your weight is raising your risk for health problems. It uses a formula to compare how much you weigh with how tall you are. · A BMI lower than 18.5 is considered underweight. · A BMI between 18.5 and 24.9 is considered healthy. · A BMI between 25 and 29.9 is considered overweight. A BMI of 30 or higher is considered obese. If your BMI is in the normal range, it means that you have a lower risk for weight-related health problems. If your BMI is in the overweight or obese range, you may be at increased risk for weight-related health problems, such as high blood pressure, heart disease, stroke, arthritis or joint pain, and diabetes. If your BMI is in the underweight range, you may be at increased risk for health problems such as fatigue, lower protection (immunity) against illness, muscle loss, bone loss, hair loss, and hormone problems. BMI is just one measure of your risk for weight-related health problems. You may be at higher risk for health problems if you are not active, you eat an unhealthy diet, or you drink too much alcohol or use tobacco products. Follow-up care is a key part of your treatment and safety. Be sure to make and go to all appointments, and call your doctor if you are having problems. It's also a good idea to know your test results and keep a list of the medicines you take. How can you care for yourself at home? · Practice healthy eating habits. This includes eating plenty of fruits, vegetables, whole grains, lean protein, and low-fat dairy. · If your doctor recommends it, get more exercise. Walking is a good choice.  Bit by bit, increase the amount you walk every day. Try for at least 30 minutes on most days of the week. · Do not smoke. Smoking can increase your risk for health problems. If you need help quitting, talk to your doctor about stop-smoking programs and medicines. These can increase your chances of quitting for good. · Limit alcohol to 2 drinks a day for men and 1 drink a day for women. Too much alcohol can cause health problems. If you have a BMI higher than 25  · Your doctor may do other tests to check your risk for weight-related health problems. This may include measuring the distance around your waist. A waist measurement of more than 40 inches in men or 35 inches in women can increase the risk of weight-related health problems. · Talk with your doctor about steps you can take to stay healthy or improve your health. You may need to make lifestyle changes to lose weight and stay healthy, such as changing your diet and getting regular exercise. If you have a BMI lower than 18.5  · Your doctor may do other tests to check your risk for health problems. · Talk with your doctor about steps you can take to stay healthy or improve your health. You may need to make lifestyle changes to gain or maintain weight and stay healthy, such as getting more healthy foods in your diet and doing exercises to build muscle. Where can you learn more? Go to http://kathy-shamar.info/. Enter S176 in the search box to learn more about \"Body Mass Index: Care Instructions. \"  Current as of: October 13, 2016  Content Version: 11.4  © 1113-4759 [employer]. Care instructions adapted under license by [employer] (which disclaims liability or warranty for this information). If you have questions about a medical condition or this instruction, always ask your healthcare professional. [place] [address] any warranty or liability for your use of this information.

## 2018-04-12 NOTE — MR AVS SNAPSHOT
[facility] 
 
 
 [address], [facility] [address] [place] [phone] Patient: Christina Elkins MRN: [id number]:[id number] Visit Information Date & Time Provider Department Dept. Phone Encounter #  
 4/12/2018 10:00 AM Pacheco Lynn MD [facility] [id number] Your Appointments 4/19/2018 10:00 AM  
Follow Up with Randy Ray MD  
[facility]) Appt Note: mid trail sleeve [address] [facility] [place] [address] [place] [address], [place] [address] [address] [facility] [address]  
  
    
 5/2/2018  3:44 AM  
METABOLIC PROGRAM 15 with Bridget Wyman NP [facility]) Appt Note: metabolic program janet [address], [address]  
  
   
 [address] [facility] [place], [facility]  
  
    
 5/11/2018 10:00 AM  
Office Visit with Pacheco Lynn MD  
[facility]) Appt Note: ov 4wks agnes [address], [address]  
  
   
 [address] [facility], [facility] Upcoming Health Maintenance Date Due DTaP/Tdap/Td series (1 - Tdap) 1/5/1991 Influenza Age 5 to Adult 8/1/2017 PAP AKA CERVICAL CYTOLOGY 12/6/2019 Allergies as of 4/12/2018  Review Complete On: 4/12/2018 By: Emilia Kendall Severity Noted Reaction Type Reactions Percocet [Oxycodone-acetaminophen]  04/07/2015    Itching Current Immunizations  Never Reviewed No immunizations on file. Not reviewed this visit You Were Diagnosed With   
  
 Codes Comments Screening for deficiency anemia    -  Primary ICD-10-CM: Z13.0 ICD-9-CM: V78.1 Screening for hyperlipidemia     ICD-10-CM: Z13.220 ICD-9-CM: V77.91   
 Severe obesity (BMI 35.0-39. 9) with comorbidity ([facility] [address]     ICD-10-CM: E66.01 
ICD-9-CM: 278.01 Essential hypertension     ICD-10-CM: I10 
ICD-9-CM: 401.9 Hirsutism     ICD-10-CM: L68.0 ICD-9-CM: 704.1 Anemia, unspecified type     ICD-10-CM: D64.9 ICD-9-CM: 259. 9 Alopecia     ICD-10-CM: L65.9 ICD-9-CM: 704.00 Sarcoidosis     ICD-10-CM: D86.9 ICD-9-CM: 135 Vitals BP Pulse Temp Resp Height(growth percentile) Weight(growth percentile) 115/80 (BP 1 Location: Right arm, BP Patient Position: Sitting) 72 98.1 °F (36.7 °C) (Oral) 16 5' 6\" (1.676 m) 221 lb (100.2 kg) SpO2 BMI OB Status Smoking Status 98% 35.67 kg/m2 Ablation Former Smoker BMI and BSA Data Body Mass Index Body Surface Area  
 35.67 kg/m 2 2.16 m 2 Preferred Pharmacy Pharmacy Name Phone [id number] [place] [address] [phone] Your Updated Medication List  
  
   
This list is accurate as of 4/12/18 11:00 AM.  Always use your most recent med list. amLODIPine 5 mg tablet Commonly known as:  Erenest Melisa Take 1 Tab by mouth daily. aspirin 81 mg chewable tablet Take 81 mg by mouth daily. biotin 2,500 mcg Tab Take 2,500 mcg by mouth daily. clobetasol 0.05 % ointment Commonly known as:  TEMOVATE  
APPLY TO SCALP TWICE A DAY X 2 WEEKS, THEN TWICE A DAY EVERY OTHER DAY  
  
 garlic 6,332 mg Cap Take 1,000 mg by mouth daily. iron,carbonyl-vitamin C 65 mg iron- 125 mg Tbec Take  mg by mouth daily. losartan 25 mg tablet Commonly known as:  COZAAR Take 25 mg by mouth daily. ONE DAILY COMPLETE PO Take  by mouth daily. VITAMIN D3 2,000 unit Tab Generic drug:  cholecalciferol (vitamin D3) Take 2,000 Units by mouth daily. Prescriptions Sent to Pharmacy Refills  
 amLODIPine (NORVASC) 5 mg tablet 1 Sig: Take 1 Tab by mouth daily.   
 Class: Normal  
 [facility] Ph #: [phone] Route: Oral  
  
To-Do List   
 04/12/2018 Lab:  DIAZ COMPREHENSIVE PANEL   
  
 04/12/2018 Lab:  C REACTIVE PROTEIN, QT   
  
 04/12/2018 Lab:  CBC WITH AUTOMATED DIFF Around 04/12/2018 Lab:  FERRITIN Around 04/12/2018 Lab:  HEMOGLOBIN A1C W/O EAG   
  
 04/12/2018 Lab:  IRON PROFILE   
  
 04/12/2018 Lab:  LIPID PANEL   
  
 04/12/2018 Lab:  TESTOSTERONE, FREE Patient Instructions Patient was given a copy of the Advanced Medical Directive form and understands to bring it in once completed. Health Maintenance Due Topic Date Due  
 DTaP/Tdap/Td series (1 - Tdap) 01/05/1991  Influenza Age 5 to Adult  08/01/2017 Body Mass Index: Care Instructions Your Care Instructions Body mass index (BMI) can help you see if your weight is raising your risk for health problems. It uses a formula to compare how much you weigh with how tall you are. · A BMI lower than 18.5 is considered underweight. · A BMI between 18.5 and 24.9 is considered healthy. · A BMI between 25 and 29.9 is considered overweight. A BMI of 30 or higher is considered obese. If your BMI is in the normal range, it means that you have a lower risk for weight-related health problems. If your BMI is in the overweight or obese range, you may be at increased risk for weight-related health problems, such as high blood pressure, heart disease, stroke, arthritis or joint pain, and diabetes. If your BMI is in the underweight range, you may be at increased risk for health problems such as fatigue, lower protection (immunity) against illness, muscle loss, bone loss, hair loss, and hormone problems. BMI is just one measure of your risk for weight-related health problems.  You may be at higher risk for health problems if you are not active, you eat an unhealthy diet, or you drink too much alcohol or use tobacco products. Follow-up care is a key part of your treatment and safety. Be sure to make and go to all appointments, and call your doctor if you are having problems. It's also a good idea to know your test results and keep a list of the medicines you take. How can you care for yourself at home? · Practice healthy eating habits. This includes eating plenty of fruits, vegetables, whole grains, lean protein, and low-fat dairy. · If your doctor recommends it, get more exercise. Walking is a good choice. Bit by bit, increase the amount you walk every day. Try for at least 30 minutes on most days of the week. · Do not smoke. Smoking can increase your risk for health problems. If you need help quitting, talk to your doctor about stop-smoking programs and medicines. These can increase your chances of quitting for good. · Limit alcohol to 2 drinks a day for men and 1 drink a day for women. Too much alcohol can cause health problems. If you have a BMI higher than 25 · Your doctor may do other tests to check your risk for weight-related health problems. This may include measuring the distance around your waist. A waist measurement of more than 40 inches in men or 35 inches in women can increase the risk of weight-related health problems. · Talk with your doctor about steps you can take to stay healthy or improve your health. You may need to make lifestyle changes to lose weight and stay healthy, such as changing your diet and getting regular exercise. If you have a BMI lower than 18.5 · Your doctor may do other tests to check your risk for health problems. · Talk with your doctor about steps you can take to stay healthy or improve your health. You may need to make lifestyle changes to gain or maintain weight and stay healthy, such as getting more healthy foods in your diet and doing exercises to build muscle. Where can you learn more? Go to http://kathy-shamar.info/. Enter S176 in the search box to learn more about \"Body Mass Index: Care Instructions. \" Current as of: October 13, 2016 Content Version: 11.4 © 0796-5808 Healthwise, [employer]. Care instructions adapted under license by [employer] (which disclaims liability or warranty for this information). If you have questions about a medical condition or this instruction, always ask your healthcare professional. [address] any warranty or liability for your use of this information. Introducing [facility]! Jennifer Mcintyre introduces Futon patient portal. Now you can access parts of your medical record, email your doctor's office, and request medication refills online. 1. In your internet browser, go to [email or web address] 2. Click on the First Time User? Click Here link in the Sign In box. You will see the New Member Sign Up page. 3. Enter your Futon Access Code exactly as it appears below. You will not need to use this code after youve completed the sign-up process. If you do not sign up before the expiration date, you must request a new code. · Futon Access Code: [email or web address] Expires: 4/22/2018  5:13 PM 
 
4. Enter the last four digits of your Social Security Number (xxxx) and Date of Birth (mm/dd/yyyy) as indicated and click Submit. You will be taken to the next sign-up page. 5. Create a Futon ID. This will be your Futon login ID and cannot be changed, so think of one that is secure and easy to remember. 6. Create a Futon password. You can change your password at any time. 7. Enter your Password Reset Question and Answer. This can be used at a later time if you forget your password. 8. Enter your e-mail address. You will receive e-mail notification when new information is available in [address] 9. Click Sign Up. You can now view and download portions of your medical record. 10. Click the Download Summary menu link to download a portable copy of your medical information. If you have questions, please visit the Frequently Asked Questions section of the Dun[employer]u website. Remember, Dunamu is NOT to be used for urgent needs. For medical emergencies, dial 911. Now available from your iPhone and Android! Please provide this summary of care documentation to your next provider. Your primary care clinician is listed as Ismael Estevez. If you have any questions after today's visit, please call [phone].

## 2018-04-13 LAB
CENTROMERE B AB SER-ACNC: <0.2 AI (ref 0–0.9)
CHROMATIN AB SERPL-ACNC: <0.2 AI (ref 0–0.9)
DSDNA AB SER-ACNC: <1 IU/ML (ref 0–9)
ENA JO1 AB SER-ACNC: <0.2 AI (ref 0–0.9)
ENA RNP AB SER-ACNC: <0.2 AI (ref 0–0.9)
ENA SCL70 AB SER-ACNC: <0.2 AI (ref 0–0.9)
ENA SM AB SER-ACNC: <0.2 AI (ref 0–0.9)
ENA SS-A AB SER-ACNC: 7.1 AI (ref 0–0.9)
ENA SS-B AB SER-ACNC: <0.2 AI (ref 0–0.9)
SEE BELOW, 164869: ABNORMAL
TESTOST FREE SERPL-MCNC: 0.7 PG/ML (ref 0–4.2)

## 2018-04-16 ENCOUNTER — TELEPHONE (OUTPATIENT)
Dept: INTERNAL MEDICINE CLINIC | Age: 48
End: 2018-04-16

## 2018-04-16 DIAGNOSIS — R79.82 ELEVATED C-REACTIVE PROTEIN (CRP): ICD-10-CM

## 2018-04-16 DIAGNOSIS — M35.00 SJOGREN'S SYNDROME, WITH UNSPECIFIED ORGAN INVOLVEMENT (HCC): Primary | ICD-10-CM

## 2018-04-16 NOTE — TELEPHONE ENCOUNTER
I notified her of her results. Placing a referral to Rheumatology.      Dr. Jessica Nuñez  [facility], [facility], [address] [place] [address] Str.  Phone: [phone]  Fax: [phone]

## 2018-04-17 ENCOUNTER — TELEPHONE (OUTPATIENT)
Dept: SURGERY | Age: 48
End: 2018-04-17

## 2018-04-17 NOTE — TELEPHONE ENCOUNTER
Patient was on the schedule for a mid trail and had one on one with Shamar Darden on 3/1/18 and told Shamar Darden that she doesn't wish to move forward in the program and called and stated she still is not interested in program and I can cancel this appointment for 4/18/18 and if she is interested she will call back. Tried to tell patient to stay in the wlt part so that she will not have to start program again and she would be ahead, but patient is still not interested.

## 2018-05-02 ENCOUNTER — OFFICE VISIT (OUTPATIENT)
Dept: INTERNAL MEDICINE CLINIC | Age: 48
End: 2018-05-02

## 2018-05-02 VITALS
WEIGHT: 214.8 LBS | DIASTOLIC BLOOD PRESSURE: 87 MMHG | OXYGEN SATURATION: 98 % | BODY MASS INDEX: 34.52 KG/M2 | HEIGHT: 66 IN | TEMPERATURE: 99 F | RESPIRATION RATE: 14 BRPM | SYSTOLIC BLOOD PRESSURE: 122 MMHG | HEART RATE: 72 BPM

## 2018-05-02 DIAGNOSIS — E66.01 SEVERE OBESITY (BMI 35.0-39.9) WITH COMORBIDITY (HCC): Primary | ICD-10-CM

## 2018-05-02 DIAGNOSIS — I10 ESSENTIAL HYPERTENSION: ICD-10-CM

## 2018-05-02 RX ORDER — LOSARTAN POTASSIUM 25 MG/1
TABLET ORAL
COMMUNITY
End: 2018-05-02 | Stop reason: SDUPTHER

## 2018-05-02 RX ORDER — CEPHALEXIN 250 MG/1
CAPSULE ORAL
COMMUNITY
End: 2018-05-02 | Stop reason: ALTCHOICE

## 2018-05-02 RX ORDER — METRONIDAZOLE 500 MG/1
TABLET ORAL
COMMUNITY
End: 2018-05-02 | Stop reason: ALTCHOICE

## 2018-05-02 RX ORDER — VARENICLINE TARTRATE 1 MG/1
TABLET, FILM COATED ORAL
COMMUNITY
End: 2018-05-02 | Stop reason: ALTCHOICE

## 2018-05-02 RX ORDER — VARENICLINE TARTRATE 0.5 MG/1
TABLET, FILM COATED ORAL
COMMUNITY
End: 2018-05-02 | Stop reason: ALTCHOICE

## 2018-05-02 NOTE — PROGRESS NOTES
[facility] Progress Note:   F/up Physician Visit    CC: Obesity      Pam Cummings is a 50 y.o. female who is here for her  f/up physician visit for the VLCD Program. Sanjay Braxton has completed 8 weeks on the [facility] Maintenance program to date. Weight stable since last visit. Maintenance going well. Working with surgeon for sleeve, wants to continue with maintenance until she is sure will get sleeve     Starting weight: 206 lbs  Current weight: 214 lbs      Goal weight: 150 lbs      Most recent EK2016 at 206 lbs    Weight Metrics 2018   Weight - 214 lb 12.8 oz 221 lb 218 lb - 214 lb 11.2 oz 222 lb   Waist Measure Inches 33 - - - 34 - 35   Exercise Mins/week 240 - - - 30 - -   Body Fat % 40.4 - - - 40.3 - -   BMI - 34.67 kg/m2 35.67 kg/m2 35.19 kg/m2 - 34.65 kg/m2 35.83 kg/m2         Current Outpatient Prescriptions   Medication Sig Dispense Refill    amLODIPine (NORVASC) 5 mg tablet Take 1 Tab by mouth daily. 30 Tab 1    aspirin 81 mg chewable tablet Take 81 mg by mouth daily.  clobetasol (TEMOVATE) 0.05 % ointment APPLY TO SCALP TWICE A DAY X 2 WEEKS, THEN TWICE A DAY EVERY OTHER DAY  2    cholecalciferol, vitamin D3, (VITAMIN D3) 2,000 unit tab Take 2,000 Units by mouth daily.  garlic 8,522 mg cap Take 1,000 mg by mouth daily.  losartan (COZAAR) 25 mg tablet Take 25 mg by mouth daily.  iron-vitamin C 65 mg iron- 125 mg TbEC Take  mg by mouth daily.  biotin 2,500 mcg Tab Take 2,500 mcg by mouth daily.  MULTIVITAMIN WITH MINERALS (ONE DAILY COMPLETE PO) Take  by mouth daily. Participation   Did you attend clinic and class last week? yes    Review of Systems  Since your last visit, have you experienced any complications? no  If yes, please list:     No CP, SOB, palpitations, lightheadedness, dizziness, constipation. Positives highlight in BOLD.       Are you taking an appetite suppressant? no  If so, is there any Chest Pain, Palpitations or Dizziness? BP Readings from Last 10 Encounters:   05/02/18 122/87   04/12/18 115/80   02/06/18 136/86   02/01/18 112/83   01/26/18 126/85   12/29/17 132/86   12/15/17 (!) 135/93   12/08/17 (!) 150/99   11/22/17 (!) 136/98   11/17/17 129/88         Have you received any other medical care this week? yes  If yes, where and for what? Rheumatology for a new patient appointment      Have you discontinued or changed any medicine or dose of your medicine since your last visit? no  If yes, where and for what? Diet  How many ounces of calorie-free liquids did you consume each day? 32-64 oz    How many meal replacements did you take each day? 2    Did you have any problems adhering to the program?  no If yes, please explain:       Exercise  Aerobic exercise: 240 min  Resistance exercise: 120 min workouts / week  Any discomfort?  no     If yes, where? Review of Systems  Complete ROS negative except where noted above    Objective  Visit Vitals    /87 (BP 1 Location: Left arm, BP Patient Position: Sitting)    Pulse 72    Temp 99 °F (37.2 °C) (Oral)    Resp 14    Ht 5' 6\" (1.676 m)    Wt 214 lb 12.8 oz (97.4 kg)    SpO2 98%    BMI 34.67 kg/m2     No LMP recorded. Patient has had an ablation.     PHQ over the last two weeks 4/12/2018   Little interest or pleasure in doing things Not at all   Feeling down, depressed or hopeless Not at all   Total Score PHQ 2 0         Waist Circumference: I personally reviewed patient's Weight Management Doc Flowsheet  Neck Circumference: I personally reviewed patient's Weight Management Doc Flowsheet  Percent Body Fat: I personally reviewed patient's Weight Management Doc Flowsheet    Physical Exam  Appearance: well appearing, obese, A&O, NAD  HEENT:  NC/AT, PERRL, No scleral icterus  Heart:  RRR without M/R/G  Lungs:  CTAB, no rhonchi, rales, or wheezes with good air exchange   Ext:  No LE Edema    Assessment / Plan    Encounter Diagnoses   Name Primary?  Severe obesity (BMI 35.0-39. 9) with comorbidity ([address] Yes    Essential hypertension        1. Weight management stable   Progress was reviewed with patient    2. Labs    Latest results reviewed with patient   Lab slip given to pt for f/up HDL labs    3. Diet regimen   # of meal replacements prescribed: 1-2   If modified LCD-nutritional guidelines:    Monthly Goal   As below    Medical monitoring schedule:   Weekly BP/Weight checks   Monthly provider appointments  I have reviewed/discussed the above normal BMI with the patient. I have recommended the following interventions: dietary management education, guidance, and counseling, monitor weight and prescribed dietary intake . Paula Wagner Ms. Velvet Alatorre has a reminder for a \"due or due soon\" health maintenance. I have asked that she contact her primary care provider for follow-up on this health maintenance. Patient Instructions     Health Maintenance Due   Topic Date Due    DTaP/Tdap/Td series (1 - Tdap) 01/05/1991     Continue to look at options for your weight loss journey. Good job maintaining weight. Body Mass Index: Care Instructions  Your Care Instructions    Body mass index (BMI) can help you see if your weight is raising your risk for health problems. It uses a formula to compare how much you weigh with how tall you are. · A BMI lower than 18.5 is considered underweight. · A BMI between 18.5 and 24.9 is considered healthy. · A BMI between 25 and 29.9 is considered overweight. A BMI of 30 or higher is considered obese. If your BMI is in the normal range, it means that you have a lower risk for weight-related health problems. If your BMI is in the overweight or obese range, you may be at increased risk for weight-related health problems, such as high blood pressure, heart disease, stroke, arthritis or joint pain, and diabetes.  If your BMI is in the underweight range, you may be at increased risk for health problems such as fatigue, lower protection (immunity) against illness, muscle loss, bone loss, hair loss, and hormone problems. BMI is just one measure of your risk for weight-related health problems. You may be at higher risk for health problems if you are not active, you eat an unhealthy diet, or you drink too much alcohol or use tobacco products. Follow-up care is a key part of your treatment and safety. Be sure to make and go to all appointments, and call your doctor if you are having problems. It's also a good idea to know your test results and keep a list of the medicines you take. How can you care for yourself at home? · Practice healthy eating habits. This includes eating plenty of fruits, vegetables, whole grains, lean protein, and low-fat dairy. · If your doctor recommends it, get more exercise. Walking is a good choice. Bit by bit, increase the amount you walk every day. Try for at least 30 minutes on most days of the week. · Do not smoke. Smoking can increase your risk for health problems. If you need help quitting, talk to your doctor about stop-smoking programs and medicines. These can increase your chances of quitting for good. · Limit alcohol to 2 drinks a day for men and 1 drink a day for women. Too much alcohol can cause health problems. If you have a BMI higher than 25  · Your doctor may do other tests to check your risk for weight-related health problems. This may include measuring the distance around your waist. A waist measurement of more than 40 inches in men or 35 inches in women can increase the risk of weight-related health problems. · Talk with your doctor about steps you can take to stay healthy or improve your health. You may need to make lifestyle changes to lose weight and stay healthy, such as changing your diet and getting regular exercise.   If you have a BMI lower than 18.5  · Your doctor may do other tests to check your risk for health problems. · Talk with your doctor about steps you can take to stay healthy or improve your health. You may need to make lifestyle changes to gain or maintain weight and stay healthy, such as getting more healthy foods in your diet and doing exercises to build muscle. Where can you learn more? Go to http://kathy-shamar.info/. Enter S176 in the search box to learn more about \"Body Mass Index: Care Instructions. \"  Current as of: October 13, 2016  Content Version: 11.4  © 0534-6192 [employer]. Care instructions adapted under license by [employer] (which disclaims liability or warranty for this information). If you have questions about a medical condition or this instruction, always ask your healthcare professional. [place] [address] any warranty or liability for your use of this information. Follow-up Disposition:  Return in about 3 months (around 8/2/2018). 10 minutes of the 15 minutes face to face time with Javier Mac consisted of counseling & coordinating and/or discussing treatment plans in reference to her The primary encounter diagnosis was Severe obesity (BMI 35.0-39. 9) with comorbidity ([address] A diagnosis of Essential hypertension was also pertinent to this visit. The patient is to follow up as scheduled and will report to the ED or the office if symptoms change or increase. The patient has voiced understanding and will comply.

## 2018-05-02 NOTE — MR AVS SNAPSHOT
[address] [facility], [place] 
 
 
 [address], [facility] [address] [facility] 
[phone] Patient: Elvin León MRN: [id number] Visit Information Date & Time Provider Department Dept. Phone Encounter #  
 5/2/2018  9:30 AM Molina Osei NP [facility]  Follow-up Instructions Return in about 3 months (around 8/2/2018). Your Appointments 5/10/2018 10:30 AM  
Office Visit with Brenda Arreguin MD  
[facility]) Appt Note: ov 4wks agnes; ov 4wks agnes [address] [facility] [address] [place]  
  
   
 [address] [facility]  
  
    
 8/1/2018 61:61 AM  
METABOLIC PROGRAM 15 with Molina Osei NP [facility] ([facility]) Appt Note: 3 month f/u  
 [address] [address] [address] [place]  
  
   
 [address] [facility] Upcoming Health Maintenance Date Due DTaP/Tdap/Td series (1 - Tdap) 1/5/1991 Influenza Age 5 to Adult 8/1/2018 PAP AKA CERVICAL CYTOLOGY 12/6/2019 Allergies as of 5/2/2018  Review Complete On: 5/2/2018 By: Jefm Phalen Severity Noted Reaction Type Reactions Percocet [Oxycodone-acetaminophen] Low 04/07/2015    Itching Current Immunizations  Never Reviewed No immunizations on file. Not reviewed this visit Vitals BP Pulse Temp Resp Height(growth percentile) Weight(growth percentile) 122/87 (BP 1 Location: Left arm, BP Patient Position: Sitting) 72 99 °F (37.2 °C) (Oral) 14 5' 6\" (1.676 m) 214 lb 12.8 oz (97.4 kg) SpO2 BMI OB Status Smoking Status 98% 34.67 kg/m2 Ablation Former Smoker BMI and BSA Data Body Mass Index Body Surface Area  
 34.67 kg/m 2 2.13 m 2 Preferred Pharmacy Pharmacy Name Phone [id number] [place] [address] [facility] [phone] Your Updated Medication List  
  
   
This list is accurate as of 5/2/18 10:04 AM.  Always use your most recent med list. amLODIPine 5 mg tablet Commonly known as:  Talib Lute Take 1 Tab by mouth daily. aspirin 81 mg chewable tablet Take 81 mg by mouth daily. biotin 2,500 mcg Tab Take 2,500 mcg by mouth daily. clobetasol 0.05 % ointment Commonly known as:  TEMOVATE  
APPLY TO SCALP TWICE A DAY X 2 WEEKS, THEN TWICE A DAY EVERY OTHER DAY  
  
 garlic 4,984 mg Cap Take 1,000 mg by mouth daily. iron,carbonyl-vitamin C 65 mg iron- 125 mg Tbec Take  mg by mouth daily. losartan 25 mg tablet Commonly known as:  COZAAR Take 25 mg by mouth daily. ONE DAILY COMPLETE PO Take  by mouth daily. VITAMIN D3 2,000 unit Tab Generic drug:  cholecalciferol (vitamin D3) Take 2,000 Units by mouth daily. Follow-up Instructions Return in about 3 months (around 8/2/2018). Patient Instructions Health Maintenance Due Topic Date Due  
 DTaP/Tdap/Td series (1 - Tdap) 01/05/1991 Continue to look at options for your weight loss journey. Good job maintaining weight. Body Mass Index: Care Instructions Your Care Instructions Body mass index (BMI) can help you see if your weight is raising your risk for health problems. It uses a formula to compare how much you weigh with how tall you are. · A BMI lower than 18.5 is considered underweight. · A BMI between 18.5 and 24.9 is considered healthy. · A BMI between 25 and 29.9 is considered overweight. A BMI of 30 or higher is considered obese. If your BMI is in the normal range, it means that you have a lower risk for weight-related health problems.  If your BMI is in the overweight or obese range, you may be at increased risk for weight-related health problems, such as high blood pressure, heart disease, stroke, arthritis or joint pain, and diabetes. If your BMI is in the underweight range, you may be at increased risk for health problems such as fatigue, lower protection (immunity) against illness, muscle loss, bone loss, hair loss, and hormone problems. BMI is just one measure of your risk for weight-related health problems. You may be at higher risk for health problems if you are not active, you eat an unhealthy diet, or you drink too much alcohol or use tobacco products. Follow-up care is a key part of your treatment and safety. Be sure to make and go to all appointments, and call your doctor if you are having problems. It's also a good idea to know your test results and keep a list of the medicines you take. How can you care for yourself at home? · Practice healthy eating habits. This includes eating plenty of fruits, vegetables, whole grains, lean protein, and low-fat dairy. · If your doctor recommends it, get more exercise. Walking is a good choice. Bit by bit, increase the amount you walk every day. Try for at least 30 minutes on most days of the week. · Do not smoke. Smoking can increase your risk for health problems. If you need help quitting, talk to your doctor about stop-smoking programs and medicines. These can increase your chances of quitting for good. · Limit alcohol to 2 drinks a day for men and 1 drink a day for women. Too much alcohol can cause health problems. If you have a BMI higher than 25 · Your doctor may do other tests to check your risk for weight-related health problems. This may include measuring the distance around your waist. A waist measurement of more than 40 inches in men or 35 inches in women can increase the risk of weight-related health problems. · Talk with your doctor about steps you can take to stay healthy or improve your health.  You may need to make lifestyle changes to lose weight and stay healthy, such as changing your diet and getting regular exercise. If you have a BMI lower than 18.5 · Your doctor may do other tests to check your risk for health problems. · Talk with your doctor about steps you can take to stay healthy or improve your health. You may need to make lifestyle changes to gain or maintain weight and stay healthy, such as getting more healthy foods in your diet and doing exercises to build muscle. Where can you learn more? Go to http://kathy-shamar.info/. Enter S176 in the search box to learn more about \"Body Mass Index: Care Instructions. \" Current as of: October 13, 2016 Content Version: 11.4 © 5932-1437 [employer]. Care instructions adapted under license by [employer] (which disclaims liability or warranty for this information). If you have questions about a medical condition or this instruction, always ask your healthcare professional. [address] any warranty or liability for your use of this information. Introducing [facility] & HEALTH SERVICES! Vivian Oliveira introduces [employer] patient portal. Now you can access parts of your medical record, email your doctor's office, and request medication refills online. 1. In your internet browser, go to [email or web address] 2. Click on the First Time User? Click Here link in the Sign In box. You will see the New Member Sign Up page. 3. Enter your [employer] Access Code exactly as it appears below. You will not need to use this code after youve completed the sign-up process. If you do not sign up before the expiration date, you must request a new code. · [employer] Access Code: [email or web address] Expires: 7/31/2018  9:25 AM 
 
4. Enter the last four digits of your Social Security Number (xxxx) and Date of Birth (mm/dd/yyyy) as indicated and click Submit. You will be taken to the next sign-up page. 5. Create a [employer] ID. This will be your [employer] login ID and cannot be changed, so think of one that is secure and easy to remember. 6. Create a [employer] password. You can change your password at any time. 7. Enter your Password Reset Question and Answer. This can be used at a later time if you forget your password. 8. Enter your e-mail address. You will receive e-mail notification when new information is available in [address] 9. Click Sign Up. You can now view and download portions of your medical record. 10. Click the Download Summary menu link to download a portable copy of your medical information. If you have questions, please visit the Frequently Asked Questions section of the [employer] website. Remember, [employer] is NOT to be used for urgent needs. For medical emergencies, dial 911. Now available from your iPhone and Android! Please provide this summary of care documentation to your next provider. Your primary care clinician is listed as Kirk Gonzalez. If you have any questions after today's visit, please call [phone].

## 2018-05-02 NOTE — PROGRESS NOTES
Chief Complaint   Patient presents with    Weight Management     1. Have you been to the ER, urgent care clinic since your last visit? Hospitalized since your last visit? No    2. Have you seen or consulted any other health care providers outside of the [address] [place] [address] since your last visit? Include any pap smears or colon screening.  No

## 2018-05-02 NOTE — PATIENT INSTRUCTIONS
Health Maintenance Due   Topic Date Due    DTaP/Tdap/Td series (1 - Tdap) 01/05/1991     Continue to look at options for your weight loss journey. Good job maintaining weight. Body Mass Index: Care Instructions  Your Care Instructions    Body mass index (BMI) can help you see if your weight is raising your risk for health problems. It uses a formula to compare how much you weigh with how tall you are. · A BMI lower than 18.5 is considered underweight. · A BMI between 18.5 and 24.9 is considered healthy. · A BMI between 25 and 29.9 is considered overweight. A BMI of 30 or higher is considered obese. If your BMI is in the normal range, it means that you have a lower risk for weight-related health problems. If your BMI is in the overweight or obese range, you may be at increased risk for weight-related health problems, such as high blood pressure, heart disease, stroke, arthritis or joint pain, and diabetes. If your BMI is in the underweight range, you may be at increased risk for health problems such as fatigue, lower protection (immunity) against illness, muscle loss, bone loss, hair loss, and hormone problems. BMI is just one measure of your risk for weight-related health problems. You may be at higher risk for health problems if you are not active, you eat an unhealthy diet, or you drink too much alcohol or use tobacco products. Follow-up care is a key part of your treatment and safety. Be sure to make and go to all appointments, and call your doctor if you are having problems. It's also a good idea to know your test results and keep a list of the medicines you take. How can you care for yourself at home? · Practice healthy eating habits. This includes eating plenty of fruits, vegetables, whole grains, lean protein, and low-fat dairy. · If your doctor recommends it, get more exercise. Walking is a good choice. Bit by bit, increase the amount you walk every day.  Try for at least 30 minutes on most days of the week. · Do not smoke. Smoking can increase your risk for health problems. If you need help quitting, talk to your doctor about stop-smoking programs and medicines. These can increase your chances of quitting for good. · Limit alcohol to 2 drinks a day for men and 1 drink a day for women. Too much alcohol can cause health problems. If you have a BMI higher than 25  · Your doctor may do other tests to check your risk for weight-related health problems. This may include measuring the distance around your waist. A waist measurement of more than 40 inches in men or 35 inches in women can increase the risk of weight-related health problems. · Talk with your doctor about steps you can take to stay healthy or improve your health. You may need to make lifestyle changes to lose weight and stay healthy, such as changing your diet and getting regular exercise. If you have a BMI lower than 18.5  · Your doctor may do other tests to check your risk for health problems. · Talk with your doctor about steps you can take to stay healthy or improve your health. You may need to make lifestyle changes to gain or maintain weight and stay healthy, such as getting more healthy foods in your diet and doing exercises to build muscle. Where can you learn more? Go to http://kathy-shamar.info/. Enter S176 in the search box to learn more about \"Body Mass Index: Care Instructions. \"  Current as of: October 13, 2016  Content Version: 11.4  © 8109-1330 [employer]. Care instructions adapted under license by [employer] (which disclaims liability or warranty for this information). If you have questions about a medical condition or this instruction, always ask your healthcare professional. [address] any warranty or liability for your use of this information.

## 2018-05-07 LAB
IRON SATN MFR SERPL: 19 % (ref 20–50)
IRON SERPL-MCNC: 69 UG/DL (ref 50–175)
TIBC SERPL-MCNC: 360 UG/DL (ref 250–450)

## 2018-05-10 ENCOUNTER — TELEPHONE (OUTPATIENT)
Dept: INTERNAL MEDICINE CLINIC | Age: 48
End: 2018-05-10

## 2018-05-10 ENCOUNTER — OFFICE VISIT (OUTPATIENT)
Dept: INTERNAL MEDICINE CLINIC | Age: 48
End: 2018-05-10

## 2018-05-10 VITALS
DIASTOLIC BLOOD PRESSURE: 87 MMHG | WEIGHT: 220 LBS | BODY MASS INDEX: 35.36 KG/M2 | OXYGEN SATURATION: 99 % | HEART RATE: 71 BPM | HEIGHT: 66 IN | SYSTOLIC BLOOD PRESSURE: 130 MMHG | TEMPERATURE: 98.4 F | RESPIRATION RATE: 14 BRPM

## 2018-05-10 DIAGNOSIS — L65.9 ALOPECIA: ICD-10-CM

## 2018-05-10 DIAGNOSIS — D86.9 SARCOIDOSIS: ICD-10-CM

## 2018-05-10 DIAGNOSIS — Z12.31 ENCOUNTER FOR SCREENING MAMMOGRAM FOR BREAST CANCER: ICD-10-CM

## 2018-05-10 DIAGNOSIS — M35.00 SJOGREN'S SYNDROME, WITH UNSPECIFIED ORGAN INVOLVEMENT (HCC): ICD-10-CM

## 2018-05-10 DIAGNOSIS — I10 ESSENTIAL HYPERTENSION: Primary | ICD-10-CM

## 2018-05-10 DIAGNOSIS — E66.01 SEVERE OBESITY (BMI 35.0-39.9) WITH COMORBIDITY (HCC): ICD-10-CM

## 2018-05-10 NOTE — PROGRESS NOTES
[facility]:  5/10/2018, MRN: [id number]      Liset Mazariegos is a 50 y.o. female and presents to clinic for Hypertension (follow up); Hair/Scalp Problem (Alopecia follow up); Labs (done 4-12-18 to discuss); and Referral Follow Up (Rheumatology Dr Ronal Long follow up)    Subjective: The pt is a 48yo female with obesity, anemia, HTN, and sarcoidosis. 1. Obesity: She is interested in getting gastric sleeve surgery. She is asking for me to clear her for this procedure today. She wants to pursue rx with Alexandra Winters. Her weight today is 220 pounds. 2. Alopecia, Positive Sjogren's Serology, H/o Sarcoidosis, and HTN: At her last appointment, patient reported worsening thinning of her hair and hair loss over the past several years. In the past, she was evaluated by dermatologist who recommended Clobetasol topical Rx. . This medication worked well for a while but eventually stopped working. Incidentally, the patient also has a history of sarcoidosis, limited to her lungs. She was diagnosed at 21years old with sarcoidosis. Her last flareup was 3-4 years ago. She is followed by pulmonologist at [facility]. Her most recent lab work showed an elevated CRP of 1.2 (on 4/12/18). She was positive for a  Sjogren's serology lab. She was referred to rheumatology. The rheumatologist did not believe that there was any underlying rheumatologic issue present. No follow-up appointment was recommended. Today, she has c/o burning along her eyes. +Dry eyes. Results for Patrick Mata (MRN [id number]) as of 5/10/2018 11:08   Ref. Range 4/12/2018 11:28   Sjogren's Anti-SS-A Latest Ref Range: 0.0 - 0.9 AI 7.1 (H)   Sjogren's Anti-SS-B Latest Ref Range: 0.0 - 0.9 AI <0.2       She was diagnosed with hypertension just 3 years ago. She states that her mother was diagnosed in her 45s. Initially, the patient was taking losartan and hydrochlorthiazide.   Her blood pressure was well-controlled there is concern that her hydrochlorthiazide may be causing her alopecia. As result, she wass taken off of hydrochlorothiazide and placed on amlodipine at her last visit. She was instructed to continue taking losartan. Her blood pressure today is well controlled. She reports no adverse side effects while on the new medication. Incidentally, some of her hair shedding has improved since stopping hydrochlorothiazide. Patient Active Problem List    Diagnosis Date Noted    Severe obesity (BMI 35.0-39. 9) with comorbidity ([address] 2018    Anemia 2018    BMI 35.0-35.9,adult 2016    Essential hypertension 2016    Sarcoidosis 2016    Menorrhalgia 2015       Current Outpatient Prescriptions   Medication Sig Dispense Refill    amLODIPine (NORVASC) 5 mg tablet Take 1 Tab by mouth daily. 30 Tab 1    aspirin 81 mg chewable tablet Take 81 mg by mouth daily.  clobetasol (TEMOVATE) 0.05 % ointment APPLY TO SCALP TWICE A DAY X 2 WEEKS, THEN TWICE A DAY EVERY OTHER DAY  2    cholecalciferol, vitamin D3, (VITAMIN D3) 2,000 unit tab Take 2,000 Units by mouth daily.  garlic 7,141 mg cap Take 1,000 mg by mouth daily.  losartan (COZAAR) 25 mg tablet Take 25 mg by mouth daily.  iron-vitamin C 65 mg iron- 125 mg TbEC Take  mg by mouth daily.  biotin 2,500 mcg Tab Take 2,500 mcg by mouth daily.  MULTIVITAMIN WITH MINERALS (ONE DAILY COMPLETE PO) Take  by mouth daily.          Allergies   Allergen Reactions    Percocet [Oxycodone-Acetaminophen] Itching       Past Medical History:   Diagnosis Date    Anemia     Asthma     childhood asthma    Hypertension     Menorrhalgia 2015    Sarcoidosis     no problems managed by PCP       Past Surgical History:   Procedure Laterality Date    HX ABDOMINOPLASTY      HX  SECTION      x 4    HX HERNIA REPAIR      umbilical    HX LIPOMA RESECTION      lower back    HX TUBAL LIGATION      HX WISDOM TEETH EXTRACTION      x2    MYOMECTOMY 1-4,W/TOT 250GMS/<,ABD APPRCH         Family History   Problem Relation Age of Onset    Hypertension Mother     Diabetes Father     No Known Problems Maternal Grandmother     Alcohol abuse Maternal Grandfather     Cancer Paternal Grandmother      breast    Alcohol abuse Paternal Grandfather     No Known Problems Daughter     No Known Problems Son        Social History   Substance Use Topics    Smoking status: Former Smoker     Packs/day: 0.25     Types: Cigarettes     Quit date: 3/15/2017    Smokeless tobacco: Never Used    Alcohol use 0.0 oz/week     0 Standard drinks or equivalent per week      Comment: once every few months       ROS   Review of Systems   Constitutional: Negative for chills and fever. HENT: Negative for ear pain and sore throat. Eyes: Negative for blurred vision and pain. Respiratory: Negative for cough and shortness of breath. Cardiovascular: Negative for chest pain. Gastrointestinal: Negative for abdominal pain, blood in stool and melena. Genitourinary: Negative for dysuria and hematuria. Musculoskeletal: Negative for joint pain and myalgias. Skin: Negative for rash. Neurological: Negative for tingling, focal weakness and headaches. Endo/Heme/Allergies: Does not bruise/bleed easily. Psychiatric/Behavioral: Negative for substance abuse. Objective     Vitals:    05/10/18 1031   BP: 130/87   Pulse: 71   Resp: 14   Temp: 98.4 °F (36.9 °C)   TempSrc: Oral   SpO2: 99%   Weight: 220 lb (99.8 kg)   Height: 5' 6\" (1.676 m)   PainSc:   0 - No pain       Physical Exam   Constitutional: She is oriented to person, place, and time and well-developed, well-nourished, and in no distress. HENT:   Head: Normocephalic and atraumatic. Right Ear: External ear normal.   Left Ear: External ear normal.   Nose: Nose normal.   Mouth/Throat: Oropharynx is clear and moist. No oropharyngeal exudate.    Eyes: Conjunctivae and EOM are normal. Right eye exhibits no discharge. Left eye exhibits no discharge. No scleral icterus. Neck: Neck supple. Cardiovascular: Normal rate, regular rhythm, normal heart sounds and intact distal pulses. Exam reveals no gallop and no friction rub. No murmur heard. Pulmonary/Chest: Effort normal and breath sounds normal. No respiratory distress. She has no wheezes. She has no rales. Abdominal: Soft. Bowel sounds are normal. She exhibits no distension. There is no tenderness. There is no rebound and no guarding. Musculoskeletal: She exhibits no edema or tenderness (BUE). Lymphadenopathy:     She has no cervical adenopathy. Neurological: She is alert and oriented to person, place, and time. She exhibits normal muscle tone. Gait normal.   Skin: Skin is warm and dry. No erythema. Psychiatric: Affect normal.   Nursing note and vitals reviewed.       LABS   Data Review:   Lab Results   Component Value Date/Time    WBC 5.9 04/12/2018 11:28 AM    HGB 13.6 04/12/2018 11:28 AM    HCT 42.4 04/12/2018 11:28 AM    PLATELET 682 88/08/3153 11:28 AM    MCV 81.9 04/12/2018 11:28 AM       Lab Results   Component Value Date/Time    Sodium 143 12/29/2017 09:45 AM    Potassium 3.8 12/29/2017 09:45 AM    Chloride 105 12/29/2017 09:45 AM    CO2 33 (H) 12/29/2017 09:45 AM    Anion gap 5 12/29/2017 09:45 AM    Glucose 76 12/29/2017 09:45 AM    BUN 9 12/29/2017 09:45 AM    Creatinine 0.82 12/29/2017 09:45 AM    BUN/Creatinine ratio 11 (L) 12/29/2017 09:45 AM    GFR est AA >60 12/29/2017 09:45 AM    GFR est non-AA >60 12/29/2017 09:45 AM    Calcium 8.7 12/29/2017 09:45 AM       Lab Results   Component Value Date/Time    Cholesterol, total 192 04/12/2018 11:28 AM    HDL Cholesterol 86 (H) 04/12/2018 11:28 AM    LDL cholesterol 82 06/24/2011 10:16 AM    LDL, calculated 95.2 04/12/2018 11:28 AM    VLDL, calculated 10.8 04/12/2018 11:28 AM    Triglyceride 54 04/12/2018 11:28 AM    CHOL/HDL Ratio 2.2 04/12/2018 11:28 AM       Lab Results   Component Value Date/Time    Hemoglobin A1c 5.1 04/12/2018 11:28 AM       Assessment/Plan:   1. Alopecia, Positive Sjogren's Serology, and H/o Sarcoidosis. -Her elevated CRP and positive Sjogren serology lab are concerning. Since her sarcoidosis is no longer active, there is no reason for her inflammatory marker to be elevated. Additionally, the patient also has complaint of dry eyes. Placing a referral to a different rheumatology team for a second opinion. If the patient has Sjogren's disease, symptomatic treatment would be recommended. This was discussed with the patient today. Immunosuppressive rx would not be warranted, which was discussed with her today. She may eventually develop lupus but does not fit criteria for this condition at this time. As result, I would prefer that a rheumatologist at least follow the patient. The fact that her present rheumatologist did not want to even follow her yearly or every 6 months is quite concerning to me, given her symptoms, lab test results, and history of one autoimmune condition, which would increase her risk of developing other autoimmune conditions. 2.  Obesity:  -The patient is clear for bariatric surgery. 3.  Hypertension:  -Continue with amlodipine and losartan  -Adding hydrochlorothiazide  to her medication allergy/intolerance list.    4. Health Maintenance:  - Breast cancer screening ordered. Health Maintenance Due   Topic Date Due    DTaP/Tdap/Td series (1 - Tdap) 01/05/1991     Lab review: labs are reviewed in the EHR    I have discussed the diagnosis with the patient and the intended plan as seen in the above orders. The patient has received an after-visit summary and questions were answered concerning future plans. I have discussed medication side effects and warnings with the patient as well. I have reviewed the plan of care with the patient, accepted their input and they are in agreement with the treatment goals. All questions were answered.  The patient understands the plan of care. Handouts provided today with above information. Pt instructed if symptoms worsen to call the office or report to the ED for continued care. Greater than 50% of the visit time was spent in counseling and/or coordination of care. Follow-up Disposition:  Return in about 6 months (around 11/10/2018) for BP check.     Checo Schwab MD

## 2018-05-10 NOTE — TELEPHONE ENCOUNTER
Chief Complaint   Patient presents with    New Order     per Dr Ernesto Ruff she will order your Mammogram please check with your insurance as to when you had your last Mammogram done it needs to be a year from that date before having next one done     Patient reached and stated she has already had an updated Mammogram done this past February, she will have the Mammogram Results faxed to Dr Ernesto Ruff for review, and understands Dr Ernesto Ruff will review it, and have it scanned into her chart, and at that time her Health Maintenance will be updated. All understood.

## 2018-05-10 NOTE — MR AVS SNAPSHOT
[address] [facility] 
 
 
 [address], [address] [facility] 
[phone] Patient: Leon Gupta MRN: [id number] Visit Information Date & Time Provider Department Dept. Phone Encounter #  
 5/10/2018 10:30 AM Chika Gallegos MD [facility] [phone] [id number] Follow-up Instructions Return in about 6 months (around 11/10/2018) for BP check. Your Appointments 8/1/2018 14:48 AM  
METABOLIC PROGRAM 15 with Giovanny Ochoa NP [facility] ([address] [place] [address]) Appt Note: 3 month f/u  
 [address] [facility], [address]  
  
   
 [address], [address]  
  
    
 11/12/2018 10:00 AM  
Office Visit with Chika Gallegos MD  
[facility] [address]) Appt Note: 6 month follow up  
 [address], [address] [place] [address] [address]  
  
   
 [address], [address] Upcoming Health Maintenance Date Due DTaP/Tdap/Td series (1 - Tdap) 1/5/1991 Influenza Age 5 to Adult 8/1/2018 PAP AKA CERVICAL CYTOLOGY 12/6/2019 Allergies as of 5/10/2018  Review Complete On: 5/10/2018 By: Chika Gallegos MD  
  
 Severity Noted Reaction Type Reactions Percocet [Oxycodone-acetaminophen] Low 04/07/2015    Itching Current Immunizations  Never Reviewed No immunizations on file. Not reviewed this visit Vitals BP Pulse Temp Resp Height(growth percentile) Weight(growth percentile) 130/87 (BP 1 Location: Left arm, BP Patient Position: Sitting) 71 98.4 °F (36.9 °C) (Oral) 14 5' 6\" (1.676 m) 220 lb (99.8 kg) SpO2 BMI OB Status Smoking Status 99% 35.51 kg/m2 Ablation Former Smoker Vitals History BMI and BSA Data Body Mass Index Body Surface Area 35.51 kg/m 2 2.16 m 2 Preferred Pharmacy Pharmacy Name Phone [id number]- [address] [facility] [phone] Your Updated Medication List  
  
   
This list is accurate as of 5/10/18 11:02 AM.  Always use your most recent med list. amLODIPine 5 mg tablet Commonly known as:  Sandor Doyne Take 1 Tab by mouth daily. aspirin 81 mg chewable tablet Take 81 mg by mouth daily. biotin 2,500 mcg Tab Take 2,500 mcg by mouth daily. clobetasol 0.05 % ointment Commonly known as:  TEMOVATE  
APPLY TO SCALP TWICE A DAY X 2 WEEKS, THEN TWICE A DAY EVERY OTHER DAY  
  
 garlic 5,398 mg Cap Take 1,000 mg by mouth daily. iron,carbonyl-vitamin C 65 mg iron- 125 mg Tbec Take  mg by mouth daily. losartan 25 mg tablet Commonly known as:  COZAAR Take 25 mg by mouth daily. ONE DAILY COMPLETE PO Take  by mouth daily. VITAMIN D3 2,000 unit Tab Generic drug:  cholecalciferol (vitamin D3) Take 2,000 Units by mouth daily. Follow-up Instructions Return in about 6 months (around 11/10/2018) for BP check. Patient Instructions Health Maintenance Due Topic Date Due  
 DTaP/Tdap/Td series (1 - Tdap) 01/05/1991 Body Mass Index: Care Instructions Your Care Instructions Body mass index (BMI) can help you see if your weight is raising your risk for health problems. It uses a formula to compare how much you weigh with how tall you are. · A BMI lower than 18.5 is considered underweight. · A BMI between 18.5 and 24.9 is considered healthy. · A BMI between 25 and 29.9 is considered overweight. A BMI of 30 or higher is considered obese. If your BMI is in the normal range, it means that you have a lower risk for weight-related health problems.  If your BMI is in the overweight or obese range, you may be at increased risk for weight-related health problems, such as high blood pressure, heart disease, stroke, arthritis or joint pain, and diabetes. If your BMI is in the underweight range, you may be at increased risk for health problems such as fatigue, lower protection (immunity) against illness, muscle loss, bone loss, hair loss, and hormone problems. BMI is just one measure of your risk for weight-related health problems. You may be at higher risk for health problems if you are not active, you eat an unhealthy diet, or you drink too much alcohol or use tobacco products. Follow-up care is a key part of your treatment and safety. Be sure to make and go to all appointments, and call your doctor if you are having problems. It's also a good idea to know your test results and keep a list of the medicines you take. How can you care for yourself at home? · Practice healthy eating habits. This includes eating plenty of fruits, vegetables, whole grains, lean protein, and low-fat dairy. · If your doctor recommends it, get more exercise. Walking is a good choice. Bit by bit, increase the amount you walk every day. Try for at least 30 minutes on most days of the week. · Do not smoke. Smoking can increase your risk for health problems. If you need help quitting, talk to your doctor about stop-smoking programs and medicines. These can increase your chances of quitting for good. · Limit alcohol to 2 drinks a day for men and 1 drink a day for women. Too much alcohol can cause health problems. If you have a BMI higher than 25 · Your doctor may do other tests to check your risk for weight-related health problems. This may include measuring the distance around your waist. A waist measurement of more than 40 inches in men or 35 inches in women can increase the risk of weight-related health problems. · Talk with your doctor about steps you can take to stay healthy or improve your health. You may need to make lifestyle changes to lose weight and stay healthy, such as changing your diet and getting regular exercise. If you have a BMI lower than 18.5 · Your doctor may do other tests to check your risk for health problems. · Talk with your doctor about steps you can take to stay healthy or improve your health. You may need to make lifestyle changes to gain or maintain weight and stay healthy, such as getting more healthy foods in your diet and doing exercises to build muscle. Where can you learn more? Go to http://kathy-shamar.info/. Enter S176 in the search box to learn more about \"Body Mass Index: Care Instructions. \" Current as of: October 13, 2016 Content Version: 11.4 © 6660-6856 [employer]. Care instructions adapted under license by [employer] (which disclaims liability or warranty for this information). If you have questions about a medical condition or this instruction, always ask your healthcare professional. [address] any warranty or liability for your use of this information. Introducing \[facility]\"" & HEALTH SERVICES! [facility] introduces Social Median patient portal. Now you can access parts of your medical record, email your doctor's office, and request medication refills online. 1. In your internet browser, go to [email or web address] 2. Click on the First Time User? Click Here link in the Sign In box. You will see the New Member Sign Up page. 3. Enter your Social Median Access Code exactly as it appears below. You will not need to use this code after youve completed the sign-up process. If you do not sign up before the expiration date, you must request a new code. · Social Median Access Code: [email or web address] Expires: 7/31/2018  9:25 AM 
 
4. Enter the last four digits of your Social Security Number (xxxx) and Date of Birth (mm/dd/yyyy) as indicated and click Submit. You will be taken to the next sign-up page. 5. Create a Social Median ID.  This will be your Social Median login ID and cannot be changed, so think of one that is secure and easy to remember. 6. Create a eduFire password. You can change your password at any time. 7. Enter your Password Reset Question and Answer. This can be used at a later time if you forget your password. 8. Enter your e-mail address. You will receive e-mail notification when new information is available in [address] 9. Click Sign Up. You can now view and download portions of your medical record. 10. Click the Download Summary menu link to download a portable copy of your medical information. If you have questions, please visit the Frequently Asked Questions section of the eduFire website. Remember, eduFire is NOT to be used for urgent needs. For medical emergencies, dial 911. Now available from your iPhone and Android! Please provide this summary of care documentation to your next provider. Your primary care clinician is listed as Laurent Murillo. If you have any questions after today's visit, please call [phone].

## 2018-05-10 NOTE — PROGRESS NOTES
Chief Complaint   Patient presents with    Hypertension     follow up    Hair/Scalp Problem     Alopecia follow up    Labs     done 4-12-18 to discuss    Referral Follow Up     Rheumatology Dr Andreina Sherman follow up     1. Have you been to the ER, urgent care clinic since your last visit? Hospitalized since your last visit? No    2. Have you seen or consulted any other health care providers outside of the [address] [place] [address] since your last visit? Include any pap smears or colon screening.  No

## 2018-05-10 NOTE — PATIENT INSTRUCTIONS
Health Maintenance Due   Topic Date Due    DTaP/Tdap/Td series (1 - Tdap) 01/05/1991          Body Mass Index: Care Instructions  Your Care Instructions    Body mass index (BMI) can help you see if your weight is raising your risk for health problems. It uses a formula to compare how much you weigh with how tall you are. · A BMI lower than 18.5 is considered underweight. · A BMI between 18.5 and 24.9 is considered healthy. · A BMI between 25 and 29.9 is considered overweight. A BMI of 30 or higher is considered obese. If your BMI is in the normal range, it means that you have a lower risk for weight-related health problems. If your BMI is in the overweight or obese range, you may be at increased risk for weight-related health problems, such as high blood pressure, heart disease, stroke, arthritis or joint pain, and diabetes. If your BMI is in the underweight range, you may be at increased risk for health problems such as fatigue, lower protection (immunity) against illness, muscle loss, bone loss, hair loss, and hormone problems. BMI is just one measure of your risk for weight-related health problems. You may be at higher risk for health problems if you are not active, you eat an unhealthy diet, or you drink too much alcohol or use tobacco products. Follow-up care is a key part of your treatment and safety. Be sure to make and go to all appointments, and call your doctor if you are having problems. It's also a good idea to know your test results and keep a list of the medicines you take. How can you care for yourself at home? · Practice healthy eating habits. This includes eating plenty of fruits, vegetables, whole grains, lean protein, and low-fat dairy. · If your doctor recommends it, get more exercise. Walking is a good choice. Bit by bit, increase the amount you walk every day. Try for at least 30 minutes on most days of the week. · Do not smoke. Smoking can increase your risk for health problems.  If you need help quitting, talk to your doctor about stop-smoking programs and medicines. These can increase your chances of quitting for good. · Limit alcohol to 2 drinks a day for men and 1 drink a day for women. Too much alcohol can cause health problems. If you have a BMI higher than 25  · Your doctor may do other tests to check your risk for weight-related health problems. This may include measuring the distance around your waist. A waist measurement of more than 40 inches in men or 35 inches in women can increase the risk of weight-related health problems. · Talk with your doctor about steps you can take to stay healthy or improve your health. You may need to make lifestyle changes to lose weight and stay healthy, such as changing your diet and getting regular exercise. If you have a BMI lower than 18.5  · Your doctor may do other tests to check your risk for health problems. · Talk with your doctor about steps you can take to stay healthy or improve your health. You may need to make lifestyle changes to gain or maintain weight and stay healthy, such as getting more healthy foods in your diet and doing exercises to build muscle. Where can you learn more? Go to http://kathy-shamar.info/. Enter S176 in the search box to learn more about \"Body Mass Index: Care Instructions. \"  Current as of: October 13, 2016  Content Version: 11.4  © 9871-1771 Healthwise, Incorporated. Care instructions adapted under license by [employer] (which disclaims liability or warranty for this information). If you have questions about a medical condition or this instruction, always ask your healthcare professional. [place] [address] any warranty or liability for your use of this information.

## 2018-05-10 NOTE — TELEPHONE ENCOUNTER
Pt called- [facility] imaging needs us to send  information Npi number, not sure what else    # [id number]

## 2018-05-11 NOTE — TELEPHONE ENCOUNTER
Chief Complaint   Patient presents with    Results     requesting [address] fax to Dr Baldemar Altamirano the Mammogram Results on our patient that was performed in February 2018     [facility] facility reached at [phone] Fax [phone], and the representative states they spoke with our patient who was requesting they send to Dr Baldemar Altamirano her Mammogram results that were performed back in February 2018, the representative informed the patient at that time, to sign a Medical Release Form from our office giving them permission to fax over the results. I have left a voice message for the patient to come in and sign the Medical Release Form, so that we can fax the request to receive the Mammogram Results to give to Dr Baldemar Altamirano to review and update her Health Maintenance in her chart.

## 2018-05-17 DIAGNOSIS — I10 ESSENTIAL HYPERTENSION: ICD-10-CM

## 2018-05-17 RX ORDER — AMLODIPINE BESYLATE 5 MG/1
5 TABLET ORAL DAILY
Qty: 30 TAB | Refills: 6 | Status: SHIPPED | OUTPATIENT
Start: 2018-05-17 | End: 2018-07-03 | Stop reason: SDUPTHER

## 2018-07-03 ENCOUNTER — HOSPITAL ENCOUNTER (OUTPATIENT)
Dept: LAB | Age: 48
Discharge: HOME | End: 2018-07-03
Payer: COMMERCIAL

## 2018-07-03 ENCOUNTER — OFFICE VISIT (OUTPATIENT)
Dept: INTERNAL MEDICINE CLINIC | Age: 48
End: 2018-07-03

## 2018-07-03 VITALS
WEIGHT: 224 LBS | HEIGHT: 66 IN | TEMPERATURE: 98.2 F | RESPIRATION RATE: 16 BRPM | BODY MASS INDEX: 36 KG/M2 | SYSTOLIC BLOOD PRESSURE: 130 MMHG | OXYGEN SATURATION: 98 % | HEART RATE: 76 BPM | DIASTOLIC BLOOD PRESSURE: 91 MMHG

## 2018-07-03 DIAGNOSIS — L68.0 HIRSUTISM: Primary | ICD-10-CM

## 2018-07-03 DIAGNOSIS — L68.0 HIRSUTISM: ICD-10-CM

## 2018-07-03 DIAGNOSIS — I10 ESSENTIAL HYPERTENSION: ICD-10-CM

## 2018-07-03 PROCEDURE — 84403 ASSAY OF TOTAL TESTOSTERONE: CPT | Performed by: INTERNAL MEDICINE

## 2018-07-03 PROCEDURE — 36415 COLL VENOUS BLD VENIPUNCTURE: CPT | Performed by: INTERNAL MEDICINE

## 2018-07-03 PROCEDURE — 83498 ASY HYDROXYPROGESTERONE 17-D: CPT | Performed by: INTERNAL MEDICINE

## 2018-07-03 RX ORDER — AMLODIPINE BESYLATE 5 MG/1
5 TABLET ORAL DAILY
Qty: 90 TAB | Refills: 1 | Status: SHIPPED | OUTPATIENT
Start: 2018-07-03 | End: 2018-12-18 | Stop reason: SDUPTHER

## 2018-07-03 RX ORDER — LOSARTAN POTASSIUM 25 MG/1
25 TABLET ORAL DAILY
Qty: 90 TAB | Refills: 1 | Status: SHIPPED | OUTPATIENT
Start: 2018-07-03 | End: 2019-07-08 | Stop reason: SDUPTHER

## 2018-07-03 NOTE — PROGRESS NOTES
1. Have you been to the ER, urgent care clinic since your last visit? Hospitalized since your last visit? No    2. Have you seen or consulted any other health care providers outside of the [address] [place] [address] since your last visit? Include any pap smears or colon screening.  No

## 2018-07-03 NOTE — PROGRESS NOTES
[facility]:  7/3/2018, MRN: [id number]      Art Bryant is a 50 y.o. female and presents to clinic for Hair/Scalp Problem (abnormal hair to jaw and chin)    Subjective: The pt is a 48yo female with obesity, anemia, HTN, positive Sjogren's serology, and sarcoidosis (diagnosed at 23yo, followed by [facility]). 1. Hirsutism: She reports abnormally large amts of hair growth along her jaw/chin line since her last apt. She will often remove hair via shaving or plucking the hair out with tweezers. No other alleviating factors are known. She had an ablation for uterine fibroids years ago. She is followed by her gynecologist.  She went over a year without any vaginal bleeding/menses. She had a vaginal/pelvic exam earlier this year at which time she reported to her gynecologist that she had some spotting in January of this year. Per patient history, her gynecologist did not suspect any abnormal pathology per his history and her pelvic exam findings. We do not have records of her last gynecology exam.  Meanwhile, the patient has not had any bleeding since then. No abdominal pain. 2.  Hypertension: Diagnosed about 3 years ago. Her mother was diagnosed in her 45s. She takes losartan and amlodipine and reports no adverse side effects with medications. She is asking for a refill on his medications today. She was unable to tolerate hydrochlorothiazide secondary to this medication causing alopecia. Upon stopping this medication, her alopecia resolved. Her blood pressure is mildly elevated today with a diastolic of 90. Patient Active Problem List    Diagnosis Date Noted    Severe obesity (BMI 35.0-39. 9) with comorbidity ([address] 04/12/2018    Anemia 04/11/2018    Essential hypertension 06/07/2016    Sarcoidosis 06/07/2016    Menorrhalgia 04/08/2015       Current Outpatient Prescriptions   Medication Sig Dispense Refill    amLODIPine (NORVASC) 5 mg tablet Take 1 Tab by mouth daily. 30 Tab 6    aspirin 81 mg chewable tablet Take 81 mg by mouth daily.  clobetasol (TEMOVATE) 0.05 % ointment APPLY TO SCALP TWICE A DAY X 2 WEEKS, THEN TWICE A DAY EVERY OTHER DAY  2    cholecalciferol, vitamin D3, (VITAMIN D3) 2,000 unit tab Take 2,000 Units by mouth daily.  garlic 7,836 mg cap Take 1,000 mg by mouth daily.  losartan (COZAAR) 25 mg tablet Take 25 mg by mouth daily.  iron-vitamin C 65 mg iron- 125 mg TbEC Take  mg by mouth daily.  biotin 2,500 mcg Tab Take 2,500 mcg by mouth daily.  MULTIVITAMIN WITH MINERALS (ONE DAILY COMPLETE PO) Take  by mouth daily. Allergies   Allergen Reactions    Hydrochlorothiazide Other (comments)     Hair loss    Percocet [Oxycodone-Acetaminophen] Itching       Past Medical History:   Diagnosis Date    Anemia     Asthma     childhood asthma    Hypertension     Menorrhalgia 2015    Sarcoidosis     no problems managed by PCP       Past Surgical History:   Procedure Laterality Date    HX ABDOMINOPLASTY      HX  SECTION      x 4    HX HERNIA REPAIR      umbilical    HX LIPOMA RESECTION      lower back    HX TUBAL LIGATION      HX WISDOM TEETH EXTRACTION      x2    MYOMECTOMY 1-4,W/TOT 250GMS/<,ABD APPRCH         Family History   Problem Relation Age of Onset    Hypertension Mother     Diabetes Father     No Known Problems Maternal Grandmother     Alcohol abuse Maternal Grandfather     Cancer Paternal Grandmother      breast    Alcohol abuse Paternal Grandfather     No Known Problems Daughter     No Known Problems Son        Social History   Substance Use Topics    Smoking status: Former Smoker     Packs/day: 0.25     Types: Cigarettes     Quit date: 3/15/2017    Smokeless tobacco: Never Used    Alcohol use 0.0 oz/week     0 Standard drinks or equivalent per week      Comment: once every few months       ROS   Review of Systems   Constitutional: Negative for chills and fever.    HENT: Negative for ear pain and sore throat. Eyes: Negative for blurred vision and pain. Respiratory: Negative for cough and shortness of breath. Cardiovascular: Negative for chest pain. Gastrointestinal: Negative for abdominal pain, blood in stool and melena. Genitourinary: Negative for dysuria and hematuria. Musculoskeletal: Negative for joint pain and myalgias. Skin: Negative for rash. Neurological: Negative for tingling, focal weakness and headaches. Endo/Heme/Allergies: Does not bruise/bleed easily. Psychiatric/Behavioral: Negative for substance abuse. Objective     Vitals:    07/03/18 0810   Weight: 224 lb (101.6 kg)   Height: 5' 6\" (1.676 m)   PainSc:   0 - No pain       Physical Exam   Constitutional: She is oriented to person, place, and time and well-developed, well-nourished, and in no distress. HENT:   Head: Normocephalic and atraumatic. Right Ear: External ear normal.   Left Ear: External ear normal.   Nose: Nose normal.   Mouth/Throat: Oropharynx is clear and moist. No oropharyngeal exudate. Eyes: Conjunctivae and EOM are normal. Pupils are equal, round, and reactive to light. Right eye exhibits no discharge. Left eye exhibits no discharge. No scleral icterus. Neck: Neck supple. Cardiovascular: Normal rate, regular rhythm, normal heart sounds and intact distal pulses. Exam reveals no gallop and no friction rub. No murmur heard. Pulmonary/Chest: Effort normal and breath sounds normal. No respiratory distress. She has no wheezes. She has no rales. Abdominal: Soft. Bowel sounds are normal. She exhibits no distension. Musculoskeletal: She exhibits no edema (BUE) or tenderness (BUE). Lymphadenopathy:     She has no cervical adenopathy. Neurological: She is alert and oriented to person, place, and time. She exhibits normal muscle tone. Gait normal.   Skin: Skin is warm and dry. No erythema. Psychiatric: Affect normal.   Nursing note and vitals reviewed.       LABS   Data Review:   Lab Results   Component Value Date/Time    WBC 5.9 04/12/2018 11:28 AM    HGB 13.6 04/12/2018 11:28 AM    HCT 42.4 04/12/2018 11:28 AM    PLATELET 602 40/83/5594 11:28 AM    MCV 81.9 04/12/2018 11:28 AM       Lab Results   Component Value Date/Time    Sodium 143 12/29/2017 09:45 AM    Potassium 3.8 12/29/2017 09:45 AM    Chloride 105 12/29/2017 09:45 AM    CO2 33 (H) 12/29/2017 09:45 AM    Anion gap 5 12/29/2017 09:45 AM    Glucose 76 12/29/2017 09:45 AM    BUN 9 12/29/2017 09:45 AM    Creatinine 0.82 12/29/2017 09:45 AM    BUN/Creatinine ratio 11 (L) 12/29/2017 09:45 AM    GFR est AA >60 12/29/2017 09:45 AM    GFR est non-AA >60 12/29/2017 09:45 AM    Calcium 8.7 12/29/2017 09:45 AM       Lab Results   Component Value Date/Time    Cholesterol, total 192 04/12/2018 11:28 AM    HDL Cholesterol 86 (H) 04/12/2018 11:28 AM    LDL cholesterol 82 06/24/2011 10:16 AM    LDL, calculated 95.2 04/12/2018 11:28 AM    VLDL, calculated 10.8 04/12/2018 11:28 AM    Triglyceride 54 04/12/2018 11:28 AM    CHOL/HDL Ratio 2.2 04/12/2018 11:28 AM       Lab Results   Component Value Date/Time    Hemoglobin A1c 5.1 04/12/2018 11:28 AM       Assessment/Plan:   1. Hirsutism:   - Checking a 17 hydroxyprogesterone level to rule out nonclassical congenital adrenal hyperplasia  - Ordering a total testosterone level to r/o hyperandrogenism   - Requesting her last GYN visit records. 2.  Hypertension:  -Refilling her medications. Return to clinic for a blood pressure check. Health Maintenance Due   Topic Date Due    DTaP/Tdap/Td series (1 - Tdap) 01/05/1991     Lab review: labs are reviewed in the EHR    I have discussed the diagnosis with the patient and the intended plan as seen in the above orders. The patient has received an after-visit summary and questions were answered concerning future plans. I have discussed medication side effects and warnings with the patient as well.  I have reviewed the plan of care with the patient, accepted their input and they are in agreement with the treatment goals. All questions were answered. The patient understands the plan of care. Handouts provided today with above information. Pt instructed if symptoms worsen to call the office or report to the ED for continued care. Greater than 50% of the visit time was spent in counseling and/or coordination of care. Voice recognition was used to generate this report, which may have resulted in some phonetic based errors in grammar and contents. Even though attempts were made to correct all the mistakes, some may have been missed, and remained in the body of the document. Follow-up Disposition:  Return if symptoms worsen or fail to improve.     Jose Morna MD

## 2018-07-03 NOTE — MR AVS SNAPSHOT
[facility], [place], operated by [facility] 
 
 
 [address], [address] [facility] 
[phone] Patient: Marissa Colby MRN: [id number] Visit Information Date & Time Provider Department Dept. Phone Encounter #  
 7/3/2018  8:00 AM Karmen Lo MD [facility] [phone] [id number] Follow-up Instructions Return if symptoms worsen or fail to improve. Your Appointments 8/1/2018 93:83 AM  
METABOLIC PROGRAM 15 with Fernie Loco NP [facility] ([facility]) Appt Note: 3 month f/u  
 [address] [facility], [address] [place]  
  
   
 [address], [address]  
  
    
 11/12/2018 10:00 AM  
Office Visit with Karmen Lo MD  
[facility] [place] [facility]) Appt Note: 6 month follow up  
 [address], [address] [address] [address] [place]  
  
   
 [address], [address] Upcoming Health Maintenance Date Due DTaP/Tdap/Td series (1 - Tdap) 1/5/1991 Influenza Age 5 to Adult 8/1/2018 PAP AKA CERVICAL CYTOLOGY 12/8/2019 Allergies as of 7/3/2018  Review Complete On: 7/3/2018 By: Karmen Lo MD  
  
 Severity Noted Reaction Type Reactions Hydrochlorothiazide  05/10/2018   Side Effect Other (comments) Hair loss Percocet [Oxycodone-acetaminophen] Low 04/07/2015    Itching Current Immunizations  Never Reviewed No immunizations on file. Not reviewed this visit You Were Diagnosed With   
  
 Codes Comments Hirsutism    -  Primary ICD-10-CM: L68.0 ICD-9-CM: 704.1 Essential hypertension     ICD-10-CM: I10 
ICD-9-CM: 401.9 Vitals BP Pulse Temp Resp Height(growth percentile) Weight(growth percentile) (!) 130/91 76 98.2 °F (36.8 °C) 16 5' 6\" (1.676 m) 224 lb (101.6 kg) SpO2 BMI OB Status Smoking Status 98% 36.15 kg/m2 Ablation Former Smoker Vitals History BMI and BSA Data Body Mass Index Body Surface Area  
 36.15 kg/m 2 2.18 m 2 Preferred Pharmacy Pharmacy Name Phone [id number]- [address] [place] [phone] Your Updated Medication List  
  
   
This list is accurate as of 7/3/18  8:33 AM.  Always use your most recent med list. amLODIPine 5 mg tablet Commonly known as:  Mason Edward Take 1 Tab by mouth daily. aspirin 81 mg chewable tablet Take 81 mg by mouth daily. biotin 2,500 mcg Tab Take 2,500 mcg by mouth daily. iron,carbonyl-vitamin C 65 mg iron- 125 mg Tbec Take  mg by mouth daily. losartan 25 mg tablet Commonly known as:  COZAAR Take 1 Tab by mouth daily. ONE DAILY COMPLETE PO Take  by mouth daily. VITAMIN D3 2,000 unit Tab Generic drug:  cholecalciferol (vitamin D3) Take 2,000 Units by mouth daily. Prescriptions Sent to Pharmacy Refills  
 amLODIPine (NORVASC) 5 mg tablet 1 Sig: Take 1 Tab by mouth daily. Class: Normal  
 Pharmacy: [facility], [place] [address] Ph #: [phone] Route: Oral  
 losartan (COZAAR) 25 mg tablet 1 Sig: Take 1 Tab by mouth daily. Class: Normal  
 Pharmacy: [facility], [place] [address] Ph #: [phone] Route: Oral  
  
Follow-up Instructions Return if symptoms worsen or fail to improve. To-Do List   
 07/03/2018 Lab:  17-OH PROGESTERONE [facility]   
  
 07/03/2018 Lab:  TESTOSTERONE, FREE & TOTAL Patient Instructions Learning About Hirsutism What is hirsutism? Hirsutism (say \"HER-keaton-tiz-um\") is excess hair on a woman's face or body. It can run in a woman's family. Most of the time, hirsutism is not caused by a medical problem. But once in a while, hirsutism can be a sign of a health problem. What are the symptoms? Symptoms of hirsutism include extra hair that grows on a woman's face, like it does on a man's face. Or it grows on the body, especially the chest and back. The hair is dark and coarse. What causes hirsutism? Hirsutism is common in women who have polycystic ovary syndrome (PCOS). This syndrome affects your hormone balance, ovulation, and menstrual periods. In some women, hirsutism may be caused by higher-than-normal levels of male hormones called androgens. These hormones are found in both men and women, though men have a lot more of them. In women, androgens are produced by the ovaries or the adrenal glands. But some women with hirsutism don't have PCOS or any other cause that can be found. Their hormone levels are normal, and so are their menstrual cycles. These women may have been born with hair follicles that are more sensitive to androgens. Hirsutism may also occur in some women who have diabetes or who are obese. In rare cases, the ovaries or adrenal glands may have a problem that can cause this hair growth. How is it treated? Your doctor may want to do some tests to find out if a medical problem is causing your excess hair growth. If the cause is not a medical problem, treating it is often a matter of choice. Treatments include: · Birth control pills. This is the most common treatment. Birth control pills contain hormones, so they help balance your body's hormone level. · Antiandrogens. These are prescription medicines that lower the amount of certain hormones in your body. · Topical cream. Your doctor may prescribe a cream that you rub into affected areas to slow hair growth. · Laser hair removal. This procedure uses laser treatments to heat and destroy hair follicles. Hair can be removed for good, but it may take many treatments. · Electrolysis. An electric current is applied to the hair root. This is also permanent, and it may take many treatments. It can also cause scars. If your doctor prescribed medicines, take them as directed. Be safe with medicines. Call your doctor if you think you are having a problem with your medicine. Women who have PCOS and who are overweight may be able to reduce excess hair growth by reaching a healthy weight. Home care Some women prefer to use home treatments for unwanted hair. These treatments include: · Using depilatories. These are over-the-counter creams that dissolve hair. They may irritate the skin. Hair growth returns. · Waxing. This treatment pulls the hair out by the root. Repeated waxing may result in less hair growth, but it can be painful and may irritate the skin. · Shaving. Shaving does not increase hair growth, but it can cause stubble. · Tweezing. This takes a lot of time and can be painful. · Bleaching. Bleaching makes hair lighter and harder to see. New hair that grows in will be its natural color. Follow-up care is a key part of your treatment and safety. Be sure to make and go to all appointments, and call your doctor if you are having problems. It's also a good idea to know your test results and keep a list of the medicines you take. Where can you learn more? Go to http://kathy-shamar.info/. Enter F938 in the search box to learn more about \"Learning About Hirsutism. \" Current as of: October 13, 2016 Content Version: 11.4 © 7148-4061 Healthwise, Incorporated. Care instructions adapted under license by [employer] (which disclaims liability or warranty for this information). If you have questions about a medical condition or this instruction, always ask your healthcare professional. [address] any warranty or liability for your use of this information. Introducing [facility]! Jaye Campbell introduces [employer] patient portal. Now you can access parts of your medical record, email your doctor's office, and request medication refills online. 1. In your internet browser, go to https://[email or web address]. I and love and you/[employer] 2. Click on the First Time User? Click Here link in the Sign In box. You will see the New Member Sign Up page. 3. Enter your [facility] Access Code exactly as it appears below. You will not need to use this code after youve completed the sign-up process. If you do not sign up before the expiration date, you must request a new code. · [facility] Access Code: [email or web address] Expires: 7/31/2018  9:25 AM 
 
4. Enter the last four digits of your Social Security Number (xxxx) and Date of Birth (mm/dd/yyyy) as indicated and click Submit. You will be taken to the next sign-up page. 5. Create a [facility] ID. This will be your [facility] login ID and cannot be changed, so think of one that is secure and easy to remember. 6. Create a [facility] password. You can change your password at any time. 7. Enter your Password Reset Question and Answer. This can be used at a later time if you forget your password. 8. Enter your e-mail address. You will receive e-mail notification when new information is available in [address] 9. Click Sign Up. You can now view and download portions of your medical record. 10. Click the Download Summary menu link to download a portable copy of your medical information. If you have questions, please visit the Frequently Asked Questions section of the [facility] website. Remember, [facility] is NOT to be used for urgent needs. For medical emergencies, dial 911. Now available from your iPhone and Android! Please provide this summary of care documentation to your next provider. Your primary care clinician is listed as Sarah Stevens. If you have any questions after today's visit, please call [phone].

## 2018-07-03 NOTE — PATIENT INSTRUCTIONS
Learning About Hirsutism  What is hirsutism? Hirsutism (say \"HER-keaton-tiz-um\") is excess hair on a woman's face or body. It can run in a woman's family. Most of the time, hirsutism is not caused by a medical problem. But once in a while, hirsutism can be a sign of a health problem. What are the symptoms? Symptoms of hirsutism include extra hair that grows on a woman's face, like it does on a man's face. Or it grows on the body, especially the chest and back. The hair is dark and coarse. What causes hirsutism? Hirsutism is common in women who have polycystic ovary syndrome (PCOS). This syndrome affects your hormone balance, ovulation, and menstrual periods. In some women, hirsutism may be caused by higher-than-normal levels of male hormones called androgens. These hormones are found in both men and women, though men have a lot more of them. In women, androgens are produced by the ovaries or the adrenal glands. But some women with hirsutism don't have PCOS or any other cause that can be found. Their hormone levels are normal, and so are their menstrual cycles. These women may have been born with hair follicles that are more sensitive to androgens. Hirsutism may also occur in some women who have diabetes or who are obese. In rare cases, the ovaries or adrenal glands may have a problem that can cause this hair growth. How is it treated? Your doctor may want to do some tests to find out if a medical problem is causing your excess hair growth. If the cause is not a medical problem, treating it is often a matter of choice. Treatments include:  · Birth control pills. This is the most common treatment. Birth control pills contain hormones, so they help balance your body's hormone level. · Antiandrogens. These are prescription medicines that lower the amount of certain hormones in your body. · Topical cream. Your doctor may prescribe a cream that you rub into affected areas to slow hair growth.   · Laser hair removal. This procedure uses laser treatments to heat and destroy hair follicles. Hair can be removed for good, but it may take many treatments. · Electrolysis. An electric current is applied to the hair root. This is also permanent, and it may take many treatments. It can also cause scars. If your doctor prescribed medicines, take them as directed. Be safe with medicines. Call your doctor if you think you are having a problem with your medicine. Women who have PCOS and who are overweight may be able to reduce excess hair growth by reaching a healthy weight. Home care  Some women prefer to use home treatments for unwanted hair. These treatments include:  · Using depilatories. These are over-the-counter creams that dissolve hair. They may irritate the skin. Hair growth returns. · Waxing. This treatment pulls the hair out by the root. Repeated waxing may result in less hair growth, but it can be painful and may irritate the skin. · Shaving. Shaving does not increase hair growth, but it can cause stubble. · Tweezing. This takes a lot of time and can be painful. · Bleaching. Bleaching makes hair lighter and harder to see. New hair that grows in will be its natural color. Follow-up care is a key part of your treatment and safety. Be sure to make and go to all appointments, and call your doctor if you are having problems. It's also a good idea to know your test results and keep a list of the medicines you take. Where can you learn more? Go to http://kathy-shamar.info/. Enter P061 in the search box to learn more about \"Learning About Hirsutism. \"  Current as of: October 13, 2016  Content Version: 11.4  © 5773-5003 [employer]. Care instructions adapted under license by [employer] (which disclaims liability or warranty for this information).  If you have questions about a medical condition or this instruction, always ask your healthcare professional. [employer] disclaims any warranty or liability for your use of this information.

## 2018-07-05 LAB
TESTOST FREE SERPL-MCNC: 1.4 PG/ML (ref 0–4.2)
TESTOST SERPL-MCNC: 33 NG/DL (ref 8–48)

## 2018-07-07 LAB — 17OHP SERPL-MCNC: 26 NG/DL

## 2018-12-18 DIAGNOSIS — I10 ESSENTIAL HYPERTENSION: ICD-10-CM

## 2018-12-18 RX ORDER — AMLODIPINE BESYLATE 5 MG/1
TABLET ORAL
Qty: 90 TAB | Refills: 1 | Status: SHIPPED | OUTPATIENT
Start: 2018-12-18 | End: 2019-08-01 | Stop reason: SDUPTHER

## 2018-12-27 ENCOUNTER — OFFICE VISIT (OUTPATIENT)
Dept: INTERNAL MEDICINE CLINIC | Age: 48
End: 2018-12-27

## 2018-12-27 VITALS
TEMPERATURE: 98.3 F | WEIGHT: 234.2 LBS | DIASTOLIC BLOOD PRESSURE: 92 MMHG | HEART RATE: 71 BPM | BODY MASS INDEX: 37.64 KG/M2 | SYSTOLIC BLOOD PRESSURE: 133 MMHG | HEIGHT: 66 IN | RESPIRATION RATE: 20 BRPM

## 2018-12-27 DIAGNOSIS — I10 ESSENTIAL HYPERTENSION: ICD-10-CM

## 2018-12-27 DIAGNOSIS — F17.210 CIGARETTE NICOTINE DEPENDENCE WITHOUT COMPLICATION: ICD-10-CM

## 2018-12-27 DIAGNOSIS — E66.01 SEVERE OBESITY (BMI 35.0-39.9) WITH COMORBIDITY (HCC): Primary | ICD-10-CM

## 2018-12-27 DIAGNOSIS — F32.1 CURRENT MODERATE EPISODE OF MAJOR DEPRESSIVE DISORDER WITHOUT PRIOR EPISODE (HCC): ICD-10-CM

## 2018-12-27 NOTE — PROGRESS NOTES
Chief Complaint   Patient presents with    Hypertension       1. Have you been to the ER, urgent care clinic since your last visit? Hospitalized since your last visit? No    2. Have you seen or consulted any other health care providers outside of the [facility] since your last visit? Include any pap smears or colon screening. No    Patient was given a copy of the Advanced Directive and understands to bring it in once completed.   Health Maintenance Due   Topic Date Due    DTaP/Tdap/Td series (1 - Tdap) 01/05/1991

## 2018-12-27 NOTE — PATIENT INSTRUCTIONS
Patient was given a copy of the Advanced Medical Directive Form, and understands to bring it in once completed.   Health Maintenance Due   Topic Date Due    DTaP/Tdap/Td series (1 - Tdap) 01/05/1991

## 2018-12-27 NOTE — PROGRESS NOTES
[facility]:  12/27/2018, MRN: [id number]      Javier Freire is a 50 y.o. female and presents to clinic for Hypertension    Subjective: The pt is a 48yo female with obesity, anemia, HTN, depression, positive Sjogren's serology, and sarcoidosis (diagnosed at 21yo, followed by [facility] Pulmonology).     HTN, Obesity, Smoking, and Depression: Present for >3 months. On losartan and amlodipine. Her BP is 133/92 today. She reports no adverse side effects on her rx. Her weight is 234lbs. She is stress eating. She has some marital strife. She has some depression. Her  was gone for a year (as a government contractor) but returned in August 2018. Since then, it's been stressful in her home. No domestic violence. There is some financial stressors as her  changed his job without speaking with her. She was to get bariatric surgery but lost her insurance due to him changing his job, just a wk before her planned bariatric surgery. She is applying for a job as an  with [employer]. She has 3 kids. She was given a rx for wellbutrin for smoking cessation by her Pulmonology team. She is consuming 3-4 drinks per wk. She is smoking 1/3 pack per day. She has not tried wellbutrin yet. Her PHQ 9 score is 14 today. She had some thoughts of suicidal ideation in September but recently simply has had only feelings of hopelessness. She denies suicidal ideation today. She identifies as Pentecostal but does not attend [employer] regularly. Wt Readings from Last 3 Encounters:   12/27/18 234 lb 3.2 oz (106.2 kg)   07/03/18 224 lb (101.6 kg)   05/10/18 220 lb (99.8 kg)         Patient Active Problem List    Diagnosis Date Noted    Severe obesity (BMI 35.0-39. 9) with comorbidity ([address] 04/12/2018    Anemia 04/11/2018    Essential hypertension 06/07/2016    Sarcoidosis 06/07/2016    Menorrhalgia 04/08/2015       Current Outpatient Medications   Medication Sig Dispense Refill    amLODIPine (NORVASC) 5 mg tablet TAKE 1 TABLET BY MOUTH EVERY DAY 90 Tab 1    losartan (COZAAR) 25 mg tablet Take 1 Tab by mouth daily. 90 Tab 1    aspirin 81 mg chewable tablet Take 81 mg by mouth daily.  cholecalciferol, vitamin D3, (VITAMIN D3) 2,000 unit tab Take 2,000 Units by mouth daily.  iron-vitamin C 65 mg iron- 125 mg TbEC Take  mg by mouth daily.  biotin 2,500 mcg Tab Take 2,500 mcg by mouth daily.  MULTIVITAMIN WITH MINERALS (ONE DAILY COMPLETE PO) Take  by mouth daily. Allergies   Allergen Reactions    Hydrochlorothiazide Other (comments)     Hair loss    Percocet [Oxycodone-Acetaminophen] Itching       Past Medical History:   Diagnosis Date    Anemia     Asthma     childhood asthma    Hypertension     Menorrhalgia 2015    Sarcoidosis     no problems managed by PCP       Past Surgical History:   Procedure Laterality Date    HX ABDOMINOPLASTY      HX  SECTION      x 4    HX HERNIA REPAIR      umbilical    HX LIPOMA RESECTION      lower back    HX TUBAL LIGATION      HX WISDOM TEETH EXTRACTION      x2    MYOMECTOMY 1-4,W/TOT 250GMS/<,ABD APPRCH         Family History   Problem Relation Age of Onset    Hypertension Mother     Diabetes Father     No Known Problems Maternal Grandmother     Alcohol abuse Maternal Grandfather     Cancer Paternal Grandmother         breast    Alcohol abuse Paternal Grandfather     No Known Problems Daughter     No Known Problems Son        Social History     Tobacco Use    Smoking status: Current Every Day Smoker     Packs/day: 0.25     Years: 30.00     Pack years: 7.50     Types: Cigarettes     Start date: 2018    Smokeless tobacco: Never Used   Substance Use Topics    Alcohol use: Yes     Alcohol/week: 0.0 - 4.2 oz     Comment: patient will drink either wine or liquour within the week        ROS   Review of Systems   Constitutional: Negative for chills and fever. HENT: Negative for ear pain and sore throat.     Eyes: Negative for blurred vision and pain. Respiratory: Negative for cough and shortness of breath. Cardiovascular: Negative for chest pain. Gastrointestinal: Negative for abdominal pain, blood in stool and melena. Genitourinary: Negative for dysuria and hematuria. Musculoskeletal: Negative for joint pain and myalgias. Skin: Negative for rash. Neurological: Negative for tingling, focal weakness and headaches. Endo/Heme/Allergies: Does not bruise/bleed easily. Psychiatric/Behavioral: Positive for depression. Negative for substance abuse. The patient is nervous/anxious. Objective     Vitals:    12/27/18 1138   BP: (!) 133/92   Pulse: 71   Resp: 20   Temp: 98.3 °F (36.8 °C)   TempSrc: Oral   Weight: 234 lb 3.2 oz (106.2 kg)   Height: 5' 6\" (1.676 m)   PainSc:   0 - No pain       Physical Exam   Constitutional: She is oriented to person, place, and time and well-developed, well-nourished, and in no distress. HENT:   Head: Normocephalic and atraumatic. Right Ear: External ear normal.   Left Ear: External ear normal.   Nose: Nose normal.   Mouth/Throat: Oropharynx is clear and moist. No oropharyngeal exudate. Eyes: Conjunctivae and EOM are normal. Pupils are equal, round, and reactive to light. Right eye exhibits no discharge. Left eye exhibits no discharge. No scleral icterus. Neck: Neck supple. Cardiovascular: Normal rate, regular rhythm, normal heart sounds and intact distal pulses. Exam reveals no gallop and no friction rub. No murmur heard. Pulmonary/Chest: Effort normal and breath sounds normal. No respiratory distress. She has no wheezes. She has no rales. Abdominal: Soft. Bowel sounds are normal. She exhibits no distension. There is no tenderness. There is no rebound and no guarding. Musculoskeletal: She exhibits no edema or tenderness (BUE). Lymphadenopathy:     She has no cervical adenopathy. Neurological: She is alert and oriented to person, place, and time.  She exhibits normal muscle tone. Gait normal.   Skin: Skin is warm and dry. No erythema. Psychiatric: Affect normal.   Nursing note and vitals reviewed. LABS   Data Review:   Lab Results   Component Value Date/Time    WBC 5.9 04/12/2018 11:28 AM    HGB 13.6 04/12/2018 11:28 AM    HCT 42.4 04/12/2018 11:28 AM    PLATELET 719 40/11/1802 11:28 AM    MCV 81.9 04/12/2018 11:28 AM       Lab Results   Component Value Date/Time    Sodium 143 12/29/2017 09:45 AM    Potassium 3.8 12/29/2017 09:45 AM    Chloride 105 12/29/2017 09:45 AM    CO2 33 (H) 12/29/2017 09:45 AM    Anion gap 5 12/29/2017 09:45 AM    Glucose 76 12/29/2017 09:45 AM    BUN 9 12/29/2017 09:45 AM    Creatinine 0.82 12/29/2017 09:45 AM    BUN/Creatinine ratio 11 (L) 12/29/2017 09:45 AM    GFR est AA >60 12/29/2017 09:45 AM    GFR est non-AA >60 12/29/2017 09:45 AM    Calcium 8.7 12/29/2017 09:45 AM       Lab Results   Component Value Date/Time    Cholesterol, total 192 04/12/2018 11:28 AM    HDL Cholesterol 86 (H) 04/12/2018 11:28 AM    LDL cholesterol 82 06/24/2011 10:16 AM    LDL, calculated 95.2 04/12/2018 11:28 AM    VLDL, calculated 10.8 04/12/2018 11:28 AM    Triglyceride 54 04/12/2018 11:28 AM    CHOL/HDL Ratio 2.2 04/12/2018 11:28 AM       Lab Results   Component Value Date/Time    Hemoglobin A1c 5.1 04/12/2018 11:28 AM       Assessment/Plan:   Obesity, HTN, and Depression: Her weight is up to 234lbs. Her PHQ score is 14 today. +Smoking 1/3 ppd.  -She can take Wellbutrin as already prescribed by her pulmonology team.  Evelyn Greer will have her complete a PHQ 9 score at her follow-up appointment in 4 weeks. I encouraged her to read her Bible and to pray in accordance with her underlying belief system as a means of reducing her stress. Continue with blood pressure medications as prescribed. I encouraged her to replace stress eating with a healthier alternative behavior. I will recheck her weight at her follow-up appointment.   I encouraged her to exercise regularly. I encouraged her to stop all alcoholic beverage consumption and smoking given her depression symptoms. I will need to order labs on her at her next appointment. Health Maintenance Due   Topic Date Due    DTaP/Tdap/Td series (1 - Tdap) 01/05/1991     Lab review: labs are reviewed in the EHR    I have discussed the diagnosis with the patient and the intended plan as seen in the above orders. The patient has received an after-visit summary and questions were answered concerning future plans. I have discussed medication side effects and warnings with the patient as well. I have reviewed the plan of care with the patient, accepted their input and they are in agreement with the treatment goals. All questions were answered. The patient understands the plan of care. Handouts provided today with above information. Pt instructed if symptoms worsen to call the office or report to the ED for continued care. Greater than 50% of the visit time was spent in counseling and/or coordination of care. I spent 25 min with the pt for this encounter, 15 of which were spent counseling him as described above. Voice recognition was used to generate this report, which may have resulted in some phonetic based errors in grammar and contents. Even though attempts were made to correct all the mistakes, some may have been missed, and remained in the body of the document.       Kaley Miranda MD

## 2019-01-10 ENCOUNTER — HOSPITAL ENCOUNTER (OUTPATIENT)
Dept: LAB | Age: 49
Discharge: HOME | End: 2019-01-10
Payer: COMMERCIAL

## 2019-01-10 PROCEDURE — 87624 HPV HI-RISK TYP POOLED RSLT: CPT

## 2019-01-10 PROCEDURE — 88175 CYTOPATH C/V AUTO FLUID REDO: CPT

## 2019-02-01 ENCOUNTER — TELEPHONE (OUTPATIENT)
Dept: INTERNAL MEDICINE CLINIC | Age: 49
End: 2019-02-01

## 2019-02-01 DIAGNOSIS — R76.8 POSITIVE ANA (ANTINUCLEAR ANTIBODY): ICD-10-CM

## 2019-02-01 DIAGNOSIS — I10 ESSENTIAL HYPERTENSION: Primary | ICD-10-CM

## 2019-02-01 DIAGNOSIS — D64.9 ANEMIA, UNSPECIFIED TYPE: ICD-10-CM

## 2019-02-01 NOTE — TELEPHONE ENCOUNTER
Please schedule her for labs 1 wk before her f/u apt with me.     Dr. Wallace Elaine  [facility], [address] [facility], [address] [place] [address] Str.  Phone: [phone]  Fax: [phone]

## 2019-05-28 ENCOUNTER — APPOINTMENT (OUTPATIENT)
Dept: INTERNAL MEDICINE CLINIC | Age: 49
End: 2019-05-28

## 2019-05-28 ENCOUNTER — HOSPITAL ENCOUNTER (OUTPATIENT)
Dept: LAB | Age: 49
Discharge: HOME | End: 2019-05-28
Payer: COMMERCIAL

## 2019-05-28 DIAGNOSIS — D64.9 ANEMIA, UNSPECIFIED TYPE: ICD-10-CM

## 2019-05-28 DIAGNOSIS — R76.8 POSITIVE ANA (ANTINUCLEAR ANTIBODY): ICD-10-CM

## 2019-05-28 DIAGNOSIS — I10 ESSENTIAL HYPERTENSION: ICD-10-CM

## 2019-05-28 LAB
ALBUMIN SERPL-MCNC: 3.6 G/DL (ref 3.4–5)
ALBUMIN/GLOB SERPL: 0.9 {RATIO} (ref 0.8–1.7)
ALP SERPL-CCNC: 94 U/L (ref 45–117)
ALT SERPL-CCNC: 16 U/L (ref 13–56)
ANION GAP SERPL CALC-SCNC: 7 MMOL/L (ref 3–18)
AST SERPL-CCNC: 12 U/L (ref 15–37)
BASOPHILS # BLD: 0 K/UL (ref 0–0.1)
BASOPHILS NFR BLD: 0 % (ref 0–2)
BILIRUB SERPL-MCNC: 0.3 MG/DL (ref 0.2–1)
BUN SERPL-MCNC: 7 MG/DL (ref 7–18)
BUN/CREAT SERPL: 8 (ref 12–20)
CALCIUM SERPL-MCNC: 9.2 MG/DL (ref 8.5–10.1)
CHLORIDE SERPL-SCNC: 105 MMOL/L (ref 100–108)
CHOLEST SERPL-MCNC: 195 MG/DL
CO2 SERPL-SCNC: 30 MMOL/L (ref 21–32)
CREAT SERPL-MCNC: 0.83 MG/DL (ref 0.6–1.3)
CRP SERPL-MCNC: 1.3 MG/DL (ref 0–0.3)
DIFFERENTIAL METHOD BLD: ABNORMAL
EOSINOPHIL # BLD: 0.1 K/UL (ref 0–0.4)
EOSINOPHIL NFR BLD: 1 % (ref 0–5)
ERYTHROCYTE [DISTWIDTH] IN BLOOD BY AUTOMATED COUNT: 16.8 % (ref 11.6–14.5)
FERRITIN SERPL-MCNC: 87 NG/ML (ref 8–388)
GLOBULIN SER CALC-MCNC: 3.9 G/DL (ref 2–4)
GLUCOSE SERPL-MCNC: 78 MG/DL (ref 74–99)
HCT VFR BLD AUTO: 42.1 % (ref 35–45)
HDLC SERPL-MCNC: 73 MG/DL (ref 40–60)
HDLC SERPL: 2.7 {RATIO} (ref 0–5)
HGB BLD-MCNC: 12.9 G/DL (ref 12–16)
IRON SATN MFR SERPL: 37 %
IRON SERPL-MCNC: 112 UG/DL (ref 50–175)
LDLC SERPL CALC-MCNC: 105 MG/DL (ref 0–100)
LIPID PROFILE,FLP: ABNORMAL
LYMPHOCYTES # BLD: 1.9 K/UL (ref 0.9–3.6)
LYMPHOCYTES NFR BLD: 27 % (ref 21–52)
MCH RBC QN AUTO: 25 PG (ref 24–34)
MCHC RBC AUTO-ENTMCNC: 30.6 G/DL (ref 31–37)
MCV RBC AUTO: 81.4 FL (ref 74–97)
MONOCYTES # BLD: 0.5 K/UL (ref 0.05–1.2)
MONOCYTES NFR BLD: 6 % (ref 3–10)
NEUTS SEG # BLD: 4.7 K/UL (ref 1.8–8)
NEUTS SEG NFR BLD: 66 % (ref 40–73)
PLATELET # BLD AUTO: 430 K/UL (ref 135–420)
PMV BLD AUTO: 9.7 FL (ref 9.2–11.8)
POTASSIUM SERPL-SCNC: 4.1 MMOL/L (ref 3.5–5.5)
PROT SERPL-MCNC: 7.5 G/DL (ref 6.4–8.2)
RBC # BLD AUTO: 5.17 M/UL (ref 4.2–5.3)
SODIUM SERPL-SCNC: 142 MMOL/L (ref 136–145)
TIBC SERPL-MCNC: 305 UG/DL (ref 250–450)
TRIGL SERPL-MCNC: 85 MG/DL (ref ?–150)
VLDLC SERPL CALC-MCNC: 17 MG/DL
WBC # BLD AUTO: 7.2 K/UL (ref 4.6–13.2)

## 2019-05-28 PROCEDURE — 83036 HEMOGLOBIN GLYCOSYLATED A1C: CPT

## 2019-05-28 PROCEDURE — 82728 ASSAY OF FERRITIN: CPT

## 2019-05-28 PROCEDURE — 85025 COMPLETE CBC W/AUTO DIFF WBC: CPT

## 2019-05-28 PROCEDURE — 82043 UR ALBUMIN QUANTITATIVE: CPT

## 2019-05-28 PROCEDURE — 36415 COLL VENOUS BLD VENIPUNCTURE: CPT

## 2019-05-28 PROCEDURE — 83540 ASSAY OF IRON: CPT

## 2019-05-28 PROCEDURE — 80061 LIPID PANEL: CPT

## 2019-05-28 PROCEDURE — 86140 C-REACTIVE PROTEIN: CPT

## 2019-05-28 PROCEDURE — 80053 COMPREHEN METABOLIC PANEL: CPT

## 2019-05-29 LAB
CREAT UR-MCNC: 212 MG/DL (ref 30–125)
HBA1C MFR BLD: 5.4 % (ref 4.2–5.6)
MICROALBUMIN UR-MCNC: 1.02 MG/DL (ref 0–3)
MICROALBUMIN/CREAT UR-RTO: 5 MG/G (ref 0–30)

## 2019-06-05 ENCOUNTER — OFFICE VISIT (OUTPATIENT)
Dept: INTERNAL MEDICINE CLINIC | Age: 49
End: 2019-06-05

## 2019-06-05 ENCOUNTER — HOSPITAL ENCOUNTER (OUTPATIENT)
Dept: LAB | Age: 49
Discharge: HOME | End: 2019-06-05
Payer: COMMERCIAL

## 2019-06-05 VITALS
OXYGEN SATURATION: 98 % | SYSTOLIC BLOOD PRESSURE: 140 MMHG | HEART RATE: 62 BPM | WEIGHT: 236 LBS | RESPIRATION RATE: 14 BRPM | DIASTOLIC BLOOD PRESSURE: 92 MMHG | TEMPERATURE: 97.4 F | HEIGHT: 66 IN | BODY MASS INDEX: 37.93 KG/M2

## 2019-06-05 DIAGNOSIS — Z12.31 ENCOUNTER FOR SCREENING MAMMOGRAM FOR BREAST CANCER: ICD-10-CM

## 2019-06-05 DIAGNOSIS — E66.01 SEVERE OBESITY (BMI 35.0-39.9) WITH COMORBIDITY (HCC): ICD-10-CM

## 2019-06-05 DIAGNOSIS — E55.9 VITAMIN D DEFICIENCY: ICD-10-CM

## 2019-06-05 DIAGNOSIS — Z72.0 TOBACCO USE: ICD-10-CM

## 2019-06-05 DIAGNOSIS — I10 ESSENTIAL HYPERTENSION: Primary | ICD-10-CM

## 2019-06-05 LAB — 25(OH)D3 SERPL-MCNC: 35.6 NG/ML (ref 30–100)

## 2019-06-05 PROCEDURE — 82306 VITAMIN D 25 HYDROXY: CPT

## 2019-06-05 RX ORDER — METHYLPREDNISOLONE 4 MG/1
TABLET ORAL
Refills: 0 | COMMUNITY
Start: 2019-05-13 | End: 2019-06-05 | Stop reason: ALTCHOICE

## 2019-06-05 RX ORDER — IBUPROFEN 800 MG/1
TABLET ORAL
Refills: 0 | COMMUNITY
Start: 2019-04-24 | End: 2021-03-31

## 2019-06-05 RX ORDER — VARENICLINE TARTRATE 1 MG/1
TABLET, FILM COATED ORAL
Refills: 3 | COMMUNITY
Start: 2019-04-24 | End: 2019-06-05 | Stop reason: ALTCHOICE

## 2019-06-05 NOTE — PROGRESS NOTES
Chief Complaint   Patient presents with    Hypertension     follow up ROOM 2    Labs     done 5-28-19        1. Have you been to the ER, urgent care clinic since your last visit? Hospitalized since your last visit? No    2. Have you seen or consulted any other health care providers outside of the [address] [place] [address] since your last visit? Include any pap smears or colon screening. No    Patient was given a copy of the Advanced Directive and understands to bring it in once completed.   Health Maintenance Due   Topic Date Due    Pneumococcal 0-64 years (1 of 1 - PPSV23) 01/05/1976    DTaP/Tdap/Td series (1 - Tdap) 01/05/1991

## 2019-06-05 NOTE — PROGRESS NOTES
[facility]:  6/5/2019, MRN: [id number]      Tito Ayala is a 52 y.o. female and presents to clinic for No chief complaint on file. Subjective: The pt is a 52yo female with obesity, anemia, HTN, depression, positive Sjogren's serology, and sarcoidosis (diagnosed at 22yo, followed by Dat Zamudio). 1.  Hypertension: BP is 140/92 today. She just took her rx. She has a several month history of hypertension. She is taking losartan and amlodipine. No adverse side effects of taking these medications. 2.  Tobacco use: At her last appointment, she reports smoking one third of a pack per day. She denies shortness of breath and cough symptoms since then. She is presently smoking 1 pack per month; RepRegen worked well to help her cut back on smoking. She was able to stop smoking for 3 months since her last apt but restarted smoking due to stressors. 3. Obesity: Her weight today is 236lbs. At her last appointment, she reports stress eating. She reports some symptoms of depression. Please see below. She was planning to get bariatric surgery last year. Her surgery was canceled after she lost her insuranceapparently, her  changed his job and with that they lost insurance benefits that would have covered her bariatric surgery. She is not regularly exercise. Her  and her will sometimes take walks. Wt Readings from Last 3 Encounters:   06/05/19 236 lb (107 kg)   12/27/18 234 lb 3.2 oz (106.2 kg)   07/03/18 224 lb (101.6 kg)     4. Depression and Vitamin D Deficiency: At her last appointment, her PHQ 9 score was 14. She reports marital stressors as mentioned above. She has symptoms of hopelessness but denies suicidal ideation at the time of her last appointment in December. Over the past year, she has had some suicidal ideation symptoms though. She identifies as [employer] and finds strength with prayer. No drug use. No ETOH use.  Since her last apt, she reports some lethargy. \"Some times I don't even feel like getting out of bed. \" She concerned that her vitamin D level may be low but then states that a friend  from sarcoidosis recently so \"I need to take care of myself. \"  She is taking OTC vitamin D. No depression. She seems to have improved her relationship with her . Patient Active Problem List    Diagnosis Date Noted    Severe obesity (BMI 35.0-39. 9) with comorbidity ([address] 2018    Anemia 2018    Essential hypertension 2016    Sarcoidosis 2016    Menorrhalgia 2015       Current Outpatient Medications   Medication Sig Dispense Refill    amLODIPine (NORVASC) 5 mg tablet TAKE 1 TABLET BY MOUTH EVERY DAY 90 Tab 1    losartan (COZAAR) 25 mg tablet Take 1 Tab by mouth daily. 90 Tab 1    aspirin 81 mg chewable tablet Take 81 mg by mouth daily.  cholecalciferol, vitamin D3, (VITAMIN D3) 2,000 unit tab Take 2,000 Units by mouth daily.  iron-vitamin C 65 mg iron- 125 mg TbEC Take  mg by mouth daily.  biotin 2,500 mcg Tab Take 2,500 mcg by mouth daily.  MULTIVITAMIN WITH MINERALS (ONE DAILY COMPLETE PO) Take  by mouth daily.          Allergies   Allergen Reactions    Hydrochlorothiazide Other (comments)     Hair loss    Percocet [Oxycodone-Acetaminophen] Itching       Past Medical History:   Diagnosis Date    Anemia     Asthma     childhood asthma    Hypertension     Menorrhalgia 2015    Sarcoidosis     no problems managed by PCP       Past Surgical History:   Procedure Laterality Date    HX ABDOMINOPLASTY      HX  SECTION      x 4    HX HERNIA REPAIR      umbilical    HX LIPOMA RESECTION      lower back    HX TUBAL LIGATION      HX WISDOM TEETH EXTRACTION      x2    MYOMECTOMY 1-4,W/TOT 250GMS/<,ABD APPRCH         Family History   Problem Relation Age of Onset    Hypertension Mother     Diabetes Father     No Known Problems Maternal Grandmother     Alcohol abuse Maternal Grandfather     Cancer Paternal Grandmother         breast    Alcohol abuse Paternal Grandfather     No Known Problems Daughter     No Known Problems Son        Social History     Tobacco Use    Smoking status: Current Every Day Smoker     Packs/day: 0.25     Years: 30.00     Pack years: 7.50     Types: Cigarettes     Start date: 9/27/2018    Smokeless tobacco: Never Used   Substance Use Topics    Alcohol use: Yes     Alcohol/week: 0.0 - 4.2 oz     Comment: patient will drink either wine or liquour within the week        ROS   Review of Systems   Constitutional: Positive for malaise/fatigue. Negative for chills and fever. HENT: Negative for ear pain and sore throat. Eyes: Negative for blurred vision and pain. Respiratory: Negative for cough and shortness of breath. Cardiovascular: Negative for chest pain. Gastrointestinal: Negative for abdominal pain, blood in stool and melena. Genitourinary: Negative for dysuria and hematuria. Musculoskeletal: Negative for joint pain and myalgias. Skin: Negative for rash. Neurological: Negative for tingling, focal weakness and headaches. Endo/Heme/Allergies: Does not bruise/bleed easily. Psychiatric/Behavioral: Negative for substance abuse. Objective     There were no vitals filed for this visit. Physical Exam   Constitutional: She is oriented to person, place, and time and well-developed, well-nourished, and in no distress. HENT:   Head: Normocephalic and atraumatic. Right Ear: External ear normal.   Left Ear: External ear normal.   Nose: Nose normal.   Mouth/Throat: Oropharynx is clear and moist. No oropharyngeal exudate. Eyes: Pupils are equal, round, and reactive to light. Conjunctivae and EOM are normal. Right eye exhibits no discharge. Left eye exhibits no discharge. No scleral icterus. Neck: Neck supple. Cardiovascular: Normal rate, regular rhythm, normal heart sounds and intact distal pulses.  Exam reveals no gallop and no friction rub. No murmur heard. Pulmonary/Chest: Effort normal and breath sounds normal. No respiratory distress. She has no wheezes. She has no rales. Abdominal: Soft. Bowel sounds are normal. She exhibits no distension. There is no tenderness. There is no rebound and no guarding. Musculoskeletal: She exhibits no edema or tenderness (BUE). Lymphadenopathy:     She has no cervical adenopathy. Neurological: She is alert and oriented to person, place, and time. She exhibits normal muscle tone. Gait normal.   Skin: Skin is warm and dry. No erythema. Psychiatric: Affect normal.   Nursing note and vitals reviewed. LABS   Data Review:   Lab Results   Component Value Date/Time    WBC 7.2 05/28/2019 09:41 AM    HGB 12.9 05/28/2019 09:41 AM    HCT 42.1 05/28/2019 09:41 AM    PLATELET 549 (H) 09/59/9638 09:41 AM    MCV 81.4 05/28/2019 09:41 AM       Lab Results   Component Value Date/Time    Sodium 142 05/28/2019 09:41 AM    Potassium 4.1 05/28/2019 09:41 AM    Chloride 105 05/28/2019 09:41 AM    CO2 30 05/28/2019 09:41 AM    Anion gap 7 05/28/2019 09:41 AM    Glucose 78 05/28/2019 09:41 AM    BUN 7 05/28/2019 09:41 AM    Creatinine 0.83 05/28/2019 09:41 AM    BUN/Creatinine ratio 8 (L) 05/28/2019 09:41 AM    GFR est AA >60 05/28/2019 09:41 AM    GFR est non-AA >60 05/28/2019 09:41 AM    Calcium 9.2 05/28/2019 09:41 AM       Lab Results   Component Value Date/Time    Cholesterol, total 195 05/28/2019 09:41 AM    HDL Cholesterol 73 (H) 05/28/2019 09:41 AM    LDL cholesterol 82 06/24/2011 10:16 AM    LDL, calculated 105 (H) 05/28/2019 09:41 AM    VLDL, calculated 17 05/28/2019 09:41 AM    Triglyceride 85 05/28/2019 09:41 AM    CHOL/HDL Ratio 2.7 05/28/2019 09:41 AM       Lab Results   Component Value Date/Time    Hemoglobin A1c 5.4 05/28/2019 09:41 AM       Assessment/Plan:   1. Hypertension: Stable. Continue with Rx as prescribed.  Return to clinic for BP check. 2.  Obesity: Weight is 236 pounds.   I encouraged her to exercise regularly and to limit her caloric intake. I will recheck her weight at her follow-up appointment.  She will let me know if she is interested in bariatric surgery. At this time, we will hold off on this option. 3.  Tobacco use:  I encouraged her to stop smoking altogether. We discussed the importance of substituting smoking with a healthier alternative behavior. 4.  Depression/Fatigue: Her most recent labs are reassuring. Her platelet count was mildly elevated which was discussed with her today. She is concerned about vitamin D deficiency and has a history of vitamin D deficiency, I will check a vitamin D level per her request.  Note that her calcium on her most recent labs was normal.  We will recheck a CBC in a year  Checking a vitamin D per her request.  Okay to continue with over-the-counter vitamin D pending her follow-up level. I encouraged her to continue reducing stressors. She seemed to be doing a good job with this since her last appointment. 5.  Health Maintenance:  A mammogram was ordered to screen for breast cancer.  She declines the pneumonia vaccine today. Health Maintenance Due   Topic Date Due    Pneumococcal 0-64 years (1 of 1 - PPSV23) 01/05/1976    DTaP/Tdap/Td series (1 - Tdap) 01/05/1991     Lab review: labs are reviewed in the EHR in order as mentioned above. I have discussed the diagnosis with the patient and the intended plan as seen in the above orders. The patient has received an after-visit summary and questions were answered concerning future plans. I have discussed medication side effects and warnings with the patient as well. I have reviewed the plan of care with the patient, accepted their input and they are in agreement with the treatment goals. All questions were answered. The patient understands the plan of care. Handouts provided today with above information.  Pt instructed if symptoms worsen to call the office or report to the ED for continued care. Greater than 50% of the visit time was spent in counseling and/or coordination of care. The patient was counseled on the dangers of tobacco use, and was advised to quit. Reviewed strategies to maximize success, including removing cigarettes and smoking materials from environment, stress management and substitution of other forms of reinforcement. Voice recognition was used to generate this report, which may have resulted in some phonetic based errors in grammar and contents. Even though attempts were made to correct all the mistakes, some may have been missed, and remained in the body of the document.           Lidia Perdomo MD

## 2019-06-05 NOTE — PATIENT INSTRUCTIONS
Patient was given a copy of the Advanced Medical Directive Form, and understands to bring it in once completed. Health Maintenance Due   Topic Date Due    Pneumococcal 0-64 years (1 of 1 - PPSV23) 01/05/1976    DTaP/Tdap/Td series (1 - Tdap) 01/05/1991          Body Mass Index: Care Instructions  Your Care Instructions    Body mass index (BMI) can help you see if your weight is raising your risk for health problems. It uses a formula to compare how much you weigh with how tall you are. · A BMI lower than 18.5 is considered underweight. · A BMI between 18.5 and 24.9 is considered healthy. · A BMI between 25 and 29.9 is considered overweight. A BMI of 30 or higher is considered obese. If your BMI is in the normal range, it means that you have a lower risk for weight-related health problems. If your BMI is in the overweight or obese range, you may be at increased risk for weight-related health problems, such as high blood pressure, heart disease, stroke, arthritis or joint pain, and diabetes. If your BMI is in the underweight range, you may be at increased risk for health problems such as fatigue, lower protection (immunity) against illness, muscle loss, bone loss, hair loss, and hormone problems. BMI is just one measure of your risk for weight-related health problems. You may be at higher risk for health problems if you are not active, you eat an unhealthy diet, or you drink too much alcohol or use tobacco products. Follow-up care is a key part of your treatment and safety. Be sure to make and go to all appointments, and call your doctor if you are having problems. It's also a good idea to know your test results and keep a list of the medicines you take. How can you care for yourself at home? · Practice healthy eating habits. This includes eating plenty of fruits, vegetables, whole grains, lean protein, and low-fat dairy. · If your doctor recommends it, get more exercise. Walking is a good choice.  Bit by bit, increase the amount you walk every day. Try for at least 30 minutes on most days of the week. · Do not smoke. Smoking can increase your risk for health problems. If you need help quitting, talk to your doctor about stop-smoking programs and medicines. These can increase your chances of quitting for good. · Limit alcohol to 2 drinks a day for men and 1 drink a day for women. Too much alcohol can cause health problems. If you have a BMI higher than 25  · Your doctor may do other tests to check your risk for weight-related health problems. This may include measuring the distance around your waist. A waist measurement of more than 40 inches in men or 35 inches in women can increase the risk of weight-related health problems. · Talk with your doctor about steps you can take to stay healthy or improve your health. You may need to make lifestyle changes to lose weight and stay healthy, such as changing your diet and getting regular exercise. If you have a BMI lower than 18.5  · Your doctor may do other tests to check your risk for health problems. · Talk with your doctor about steps you can take to stay healthy or improve your health. You may need to make lifestyle changes to gain or maintain weight and stay healthy, such as getting more healthy foods in your diet and doing exercises to build muscle. Where can you learn more? Go to http://kathy-shamar.info/. Enter S176 in the search box to learn more about \"Body Mass Index: Care Instructions. \"  Current as of: June 25, 2018  Content Version: 11.9  © 1247-8922 [employer]. Care instructions adapted under license by [employer] (which disclaims liability or warranty for this information). If you have questions about a medical condition or this instruction, always ask your healthcare professional. [place] [address] any warranty or liability for your use of this information.

## 2019-06-13 ENCOUNTER — TELEPHONE (OUTPATIENT)
Dept: INTERNAL MEDICINE CLINIC | Age: 49
End: 2019-06-13

## 2019-06-13 DIAGNOSIS — E55.9 VITAMIN D DEFICIENCY: Primary | ICD-10-CM

## 2019-06-13 NOTE — PROGRESS NOTES
Please let her know that her vitamin D level is normal at 35.6.     Dr. Shahana Jon  [facility], [facility], [address] [place] [address] Str.  Phone: [phone]  Fax: [phone]

## 2019-06-13 NOTE — TELEPHONE ENCOUNTER
Patient aware of message below  She was a little shocked, she said she doesn't think the vit D liquid is helping her. She is asking for the vitamin D high dose vit D to take for a few weeks to help get it a little higher. She said her results are on the brink of being low (below 30), she said other doctors do not want it to go below 30 and some of her past doctors didn't want her below 48 or 61. If you agree, pls send to [facility] on file.

## 2019-06-13 NOTE — TELEPHONE ENCOUNTER
----- Message from Karmen Lo MD sent at 6/13/2019 10:46 AM EDT -----  Please let her know that her vitamin D level is normal at 35.6.     Dr. Sarah Stevens  [facility], [address] [place] [address], [address]  Phone: [phone]  Fax: [phone]

## 2019-06-17 RX ORDER — ERGOCALCIFEROL 1.25 MG/1
50000 CAPSULE ORAL
Qty: 4 CAP | Refills: 0 | Status: SHIPPED | OUTPATIENT
Start: 2019-06-17 | End: 2020-01-27 | Stop reason: ALTCHOICE

## 2019-06-17 NOTE — TELEPHONE ENCOUNTER
I discussed her last results. She is adamant that she feels fatigue from her vitamin D being low. I will order 1 month of prescription strength vitamin D. She can take OTC rx after that.     Dr. Vernette Severin  [facility], [address] [place] [address], [address] [place] [address] Str.  Phone: [phone]  Fax: [phone]

## 2019-07-08 DIAGNOSIS — I10 ESSENTIAL HYPERTENSION: ICD-10-CM

## 2019-07-08 RX ORDER — LOSARTAN POTASSIUM 25 MG/1
25 TABLET ORAL DAILY
Qty: 90 TAB | Refills: 1 | Status: SHIPPED | OUTPATIENT
Start: 2019-07-08 | End: 2020-01-13

## 2019-07-08 NOTE — TELEPHONE ENCOUNTER
Last Visit: 6/5/19 with MD Miguel Quispe  Next Appointment: 12/11/19 with MD Miguel Quispe  Previous Refill Encounter(s): 7/3/18 #90 with 1 refill    Requested Prescriptions     Pending Prescriptions Disp Refills    losartan (COZAAR) 25 mg tablet 90 Tab 3     Sig: Take 1 Tab by mouth daily.

## 2019-08-01 DIAGNOSIS — I10 ESSENTIAL HYPERTENSION: ICD-10-CM

## 2019-08-01 RX ORDER — AMLODIPINE BESYLATE 5 MG/1
5 TABLET ORAL DAILY
Qty: 90 TAB | Refills: 1 | Status: SHIPPED | OUTPATIENT
Start: 2019-08-01 | End: 2020-01-27

## 2019-08-01 NOTE — TELEPHONE ENCOUNTER
Last Visit: 06/05/2019 with MD Sophia Cool  Next Appointment: 12/11/2019 with MD Sophia Cool  Previous Refill Encounter(s): 12/18/2018 per MD Sophia Cool #90 1R    Requested Prescriptions     Pending Prescriptions Disp Refills    amLODIPine (NORVASC) 5 mg tablet 90 Tab 3     Sig: Take 1 Tab by mouth daily.

## 2019-09-17 ENCOUNTER — HOSPITAL ENCOUNTER (OUTPATIENT)
Dept: MAMMOGRAPHY | Age: 49
Discharge: HOME | End: 2019-09-17
Attending: INTERNAL MEDICINE
Payer: COMMERCIAL

## 2019-09-17 DIAGNOSIS — Z12.31 ENCOUNTER FOR SCREENING MAMMOGRAM FOR BREAST CANCER: ICD-10-CM

## 2019-09-17 PROCEDURE — 77063 BREAST TOMOSYNTHESIS BI: CPT

## 2019-10-28 ENCOUNTER — HOSPITAL ENCOUNTER (OUTPATIENT)
Dept: LAB | Age: 49
Discharge: HOME | End: 2019-10-28

## 2019-10-28 PROCEDURE — 86787 VARICELLA-ZOSTER ANTIBODY: CPT

## 2019-10-28 PROCEDURE — 86765 RUBEOLA ANTIBODY: CPT

## 2019-10-28 PROCEDURE — 86706 HEP B SURFACE ANTIBODY: CPT

## 2019-10-28 PROCEDURE — 86735 MUMPS ANTIBODY: CPT

## 2019-10-28 PROCEDURE — 36415 COLL VENOUS BLD VENIPUNCTURE: CPT

## 2019-10-28 PROCEDURE — 86762 RUBELLA ANTIBODY: CPT

## 2019-10-29 LAB
HBV SURFACE AB SER QL IA: NEGATIVE
HBV SURFACE AB SERPL IA-ACNC: 3.5 MIU/ML
HEP BS AB COMMENT,HBSAC: ABNORMAL
RUBV IGG SER-IMP: NORMAL

## 2019-10-30 LAB
MEV IGG SER IA-ACNC: 137 AU/ML
MUV IGG SER IA-ACNC: <9 AU/ML
VZV IGG SER IA-ACNC: 1076 INDEX

## 2019-12-10 ENCOUNTER — TELEPHONE (OUTPATIENT)
Dept: INTERNAL MEDICINE CLINIC | Age: 49
End: 2019-12-10

## 2019-12-10 NOTE — TELEPHONE ENCOUNTER
Patient would like to know if labs are needed for her upcoming appointment? If not she would like to know why is the appointment needed.  Patient can be reached at [phone]

## 2019-12-10 NOTE — TELEPHONE ENCOUNTER
Chief Complaint   Patient presents with   [facility] Appointment     per Dr Vale Beltrán the patient needs to keep her follow up appointment to follow up on her blood pressure, and see how she is doing     12-10-19 Patient reached and 2 identifiers were used: Full Name, and Date of Birth verified. Patient was informed that Dr Vale Beltrán has not seen her in 6 months, and would like for her to be seen to follow up on her Blood Pressure, labs are not needed at this time. The patient states she made her follow up appointment for 01-27-20 at 10:30 am, and understands she can call back at any time to see if someone has canceled for the day, and to see if she can get put on the schedule sooner. The patient states she will see Dr Vale Beltrán 01-27-20, and understands all.

## 2019-12-10 NOTE — TELEPHONE ENCOUNTER
Labs are not necessary. Her apt is necessary for a BP check. It's been 6 months since her BP was last checked in our office.     Dr. Nely Harris  Internists of [address]  Phone: [phone]  Fax: [phone]

## 2020-01-13 DIAGNOSIS — I10 ESSENTIAL HYPERTENSION: ICD-10-CM

## 2020-01-13 RX ORDER — LOSARTAN POTASSIUM 25 MG/1
TABLET ORAL
Qty: 90 TAB | Refills: 1 | Status: SHIPPED | OUTPATIENT
Start: 2020-01-13 | End: 2020-07-16

## 2020-01-27 ENCOUNTER — OFFICE VISIT (OUTPATIENT)
Dept: INTERNAL MEDICINE CLINIC | Age: 50
End: 2020-01-27

## 2020-01-27 VITALS
SYSTOLIC BLOOD PRESSURE: 121 MMHG | OXYGEN SATURATION: 99 % | WEIGHT: 241.6 LBS | HEIGHT: 66 IN | DIASTOLIC BLOOD PRESSURE: 84 MMHG | BODY MASS INDEX: 38.83 KG/M2 | HEART RATE: 69 BPM | TEMPERATURE: 98.5 F | RESPIRATION RATE: 14 BRPM

## 2020-01-27 DIAGNOSIS — I10 ESSENTIAL HYPERTENSION: ICD-10-CM

## 2020-01-27 DIAGNOSIS — Z12.11 SCREENING FOR COLON CANCER: ICD-10-CM

## 2020-01-27 DIAGNOSIS — M35.00 SJOGREN'S SYNDROME, WITH UNSPECIFIED ORGAN INVOLVEMENT (HCC): ICD-10-CM

## 2020-01-27 DIAGNOSIS — R42 DIZZINESS: Primary | ICD-10-CM

## 2020-01-27 RX ORDER — AMLODIPINE BESYLATE 2.5 MG/1
2.5 TABLET ORAL DAILY
Qty: 90 TAB | Refills: 1 | Status: SHIPPED | OUTPATIENT
Start: 2020-01-27 | End: 2020-07-16

## 2020-01-27 NOTE — PATIENT INSTRUCTIONS
Home Blood Pressure Test: About This Test 
What is it? A home blood pressure test allows you to keep track of your blood pressure at home. Blood pressure is a measure of the force of blood against the walls of your arteries. Blood pressure readings include two numbers, such as 130/80 (say \"130 over 80\"). The first number is the systolic pressure. The second number is the diastolic pressure. Why is this test done? You may do this test at home to: · Find out if you have high blood pressure. · Track your blood pressure if you have high blood pressure. · Track how well medicine is working to reduce high blood pressure. · Check how lifestyle changes, such as weight loss and exercise, are affecting blood pressure. How can you prepare for the test? 
· Do not use caffeine, tobacco, or medicines known to raise blood pressure (such as nasal decongestant sprays) for at least 30 minutes before taking your blood pressure. · Do not exercise for at least 30 minutes before taking your blood pressure. What happens before the test? 
Take your blood pressure while you feel comfortable and relaxed. Sit quietly with both feet on the floor for at least 5 minutes before the test. 
What happens during the test? 
· Sit with your arm slightly bent and resting on a table so that your upper arm is at the same level as your heart. · Roll up your sleeve or take off your shirt to expose your upper arm. · Wrap the blood pressure cuff around your upper arm so that the lower edge of the cuff is about 1 inch above the bend of your elbow. Proceed with the following steps depending on if you are using an automatic or manual pressure monitor. Automatic blood pressure monitors · Press the on/off button on the automatic monitor and wait until the ready-to-measure \"heart\" symbol appears next to zero in the display window. · Press the start button. The cuff will inflate and deflate by itself. · Your blood pressure numbers will appear on the screen. · Write your numbers in your log book, along with the date and time. Manual blood pressure monitors · Place the earpieces of a stethoscope in your ears, and place the bell of the stethoscope over the artery, just below the cuff. · Close the valve on the rubber inflating bulb. · Squeeze the bulb rapidly with your opposite hand to inflate the cuff until the dial or column of mercury reads about 30 mm Hg higher than your usual systolic pressure. If you do not know your usual pressure, inflate the cuff to 210 mm Hg or until the pulse at your wrist disappears. · Open the pressure valve just slightly by twisting or pressing the valve on the bulb. · As you watch the pressure slowly fall, note the level on the dial at which you first start to hear a pulsing or tapping sound through the stethoscope. This is your systolic blood pressure. · Continue letting the air out slowly. The sounds will become muffled and will finally disappear. Note the pressure when the sounds completely disappear. This is your diastolic blood pressure. Let out all the remaining air. · Write your numbers in your log book, along with the date and time. What else should you know about the test? 
It is more accurate to take the average of several readings made throughout the day than to rely on a single reading. It's normal for blood pressure to go up and down throughout the day. Follow-up care is a key part of your treatment and safety. Be sure to make and go to all appointments, and call your doctor if you are having problems. It's also a good idea to keep a list of the medicines you take. Where can you learn more? Go to http://kathy-shamar.info/. Enter C427 in the search box to learn more about \"Home Blood Pressure Test: About This Test.\" Current as of: April 9, 2019 Content Version: 12.2 © 3381-1307 [employer].  Care instructions adapted under license by [address] (which disclaims liability or warranty for this information). If you have questions about a medical condition or this instruction, always ask your healthcare professional. [address] any warranty or liability for your use of this information.

## 2020-01-27 NOTE — PROGRESS NOTES
INTERNISTS OF [facility]:  1/27/2020, MRN: [id number]      Marissa Colby is a 48 y.o. female and presents to clinic for Hypertension (follow up   ROOM 2)    Subjective: The pt is a 52yo female with obesity, anemia, HTN, depression, positive Sjogren's serology, and sarcoidosis (diagnosed at 22yo, followed by Dat Zamudio). 1. Dizziness: She experienced two episodes of \"losing balance\" on Saturday. Sx resolved w/o intervention. Sx occurred with walking and lasted 10 min at a time. Associated sx included lightheadedness. She has had vertigo episodes in the past and sx recently did not feel like previous vertigo episodes. No episodes since then. No paresthesias/weakness. No HAs. No palpitations. 2. HTN: BP is 121/84. Taking losartan and norvasc. Weight is at 241lbs. She is not exercising regularly. Diet: She has cut back on chips/processed foods and eating late at night. She has a BP cuff at home. 3. Sjogren's Disease: She last saw a rheumatologist last year. No dry eyes and dry mouth. She needs to call to reschedule her apt - she was supposed to meet with them in December. Patient Active Problem List    Diagnosis Date Noted    Tobacco use 06/05/2019    Severe obesity (BMI 35.0-39. 9) with comorbidity ([address] 04/12/2018    Anemia 04/11/2018    Essential hypertension 06/07/2016    Sarcoidosis 06/07/2016    Menorrhalgia 04/08/2015       Current Outpatient Medications   Medication Sig Dispense Refill    losartan (COZAAR) 25 mg tablet TAKE 1 TABLET BY MOUTH EVERY DAY 90 Tab 1    amLODIPine (NORVASC) 5 mg tablet Take 1 Tab by mouth daily. 90 Tab 1    ibuprofen (MOTRIN) 800 mg tablet TAKE 1 TABLET BY MOUTH EVERY 8 HOURS FOR 3 DAYS, THEN AS NEEDED FOR PAIN. TAKE WITH FOOD  0    cholecalciferol, vitamin D3, (VITAMIN D3) 2,000 unit tab Take 2,000 Units by mouth daily.  iron-vitamin C 65 mg iron- 125 mg TbEC Take  mg by mouth daily.       biotin 2,500 mcg Tab Take 2,500 mcg by mouth daily.      MULTIVITAMIN WITH MINERALS (ONE DAILY COMPLETE PO) Take  by mouth daily. Allergies   Allergen Reactions    Hydrochlorothiazide Other (comments)     Hair loss    Oxycodone Itching and Other (comments)    Percocet [Oxycodone-Acetaminophen] Itching       Past Medical History:   Diagnosis Date    Anemia     Asthma     childhood asthma    Hypertension     Menorrhalgia 2015    Sarcoidosis     no problems managed by PCP       Past Surgical History:   Procedure Laterality Date    HX ABDOMINOPLASTY      HX  SECTION      x 4    HX HERNIA REPAIR      umbilical    HX LIPOMA RESECTION      lower back    HX TUBAL LIGATION      HX WISDOM TEETH EXTRACTION      x2    MYOMECTOMY 1-4,W/TOT 250GMS/<,ABD APPRCH         Family History   Problem Relation Age of Onset    Hypertension Mother     Diabetes Father     No Known Problems Maternal Grandmother     Alcohol abuse Maternal Grandfather     Cancer Paternal Grandmother         breast    Alcohol abuse Paternal Grandfather     No Known Problems Daughter     No Known Problems Son        Social History     Tobacco Use    Smoking status: Current Some Day Smoker     Packs/day: 0.25     Years: 30.00     Pack years: 7.50     Types: Cigarettes     Start date: 2018    Smokeless tobacco: Never Used   Substance Use Topics    Alcohol use: Yes     Alcohol/week: 0.0 - 7.0 standard drinks     Comment: patient will drink either wine or liquour within the week        ROS   Review of Systems   Constitutional: Negative for chills and fever. HENT: Negative for ear pain and sore throat. Eyes: Negative for blurred vision and pain. Respiratory: Negative for cough and shortness of breath. Cardiovascular: Negative for chest pain. Gastrointestinal: Negative for abdominal pain, blood in stool and melena. Genitourinary: Negative for dysuria and hematuria. Musculoskeletal: Negative for joint pain and myalgias. Skin: Negative for rash. Neurological: Positive for dizziness (see HPI). Negative for tingling, focal weakness and headaches. Endo/Heme/Allergies: Does not bruise/bleed easily. Psychiatric/Behavioral: Negative for substance abuse. Objective     Vitals:    01/27/20 1026   BP: 121/84   Pulse: 69   Resp: 14   Temp: 98.5 °F (36.9 °C)   TempSrc: Oral   SpO2: 99%   Weight: 241 lb 9.6 oz (109.6 kg)   Height: 5' 6\" (1.676 m)   PainSc:   0 - No pain       Physical Exam  Vitals signs and nursing note reviewed. HENT:      Head: Normocephalic and atraumatic. Right Ear: External ear normal.      Left Ear: External ear normal.      Nose: Nose normal.      Mouth/Throat:      Pharynx: No oropharyngeal exudate. Eyes:      General: No scleral icterus. Right eye: No discharge. Left eye: No discharge. Conjunctiva/sclera: Conjunctivae normal.   Neck:      Musculoskeletal: Neck supple. Cardiovascular:      Rate and Rhythm: Normal rate and regular rhythm. Heart sounds: Normal heart sounds. No murmur. No friction rub. No gallop. Pulmonary:      Effort: Pulmonary effort is normal. No respiratory distress. Breath sounds: Normal breath sounds. No wheezing or rales. Abdominal:      General: Bowel sounds are normal. There is no distension. Palpations: Abdomen is soft. There is no mass. Tenderness: There is no abdominal tenderness. There is no guarding or rebound. Musculoskeletal:         General: No swelling (Bue) or tenderness (Bue). Lymphadenopathy:      Cervical: No cervical adenopathy. Skin:     General: Skin is warm and dry. Findings: No erythema. Neurological:      Mental Status: She is alert and oriented to person, place, and time. Motor: No abnormal muscle tone. Gait: Gait is intact.  Gait normal.   Psychiatric:         Mood and Affect: Mood and affect normal.         LABS   Data Review:   Lab Results   Component Value Date/Time    WBC 7.2 05/28/2019 09:41 AM    HGB 12.9 05/28/2019 09:41 AM    HCT 42.1 05/28/2019 09:41 AM    PLATELET 704 (H) 61/09/5418 09:41 AM    MCV 81.4 05/28/2019 09:41 AM       Lab Results   Component Value Date/Time    Sodium 142 05/28/2019 09:41 AM    Potassium 4.1 05/28/2019 09:41 AM    Chloride 105 05/28/2019 09:41 AM    CO2 30 05/28/2019 09:41 AM    Anion gap 7 05/28/2019 09:41 AM    Glucose 78 05/28/2019 09:41 AM    BUN 7 05/28/2019 09:41 AM    Creatinine 0.83 05/28/2019 09:41 AM    BUN/Creatinine ratio 8 (L) 05/28/2019 09:41 AM    GFR est AA >60 05/28/2019 09:41 AM    GFR est non-AA >60 05/28/2019 09:41 AM    Calcium 9.2 05/28/2019 09:41 AM       Lab Results   Component Value Date/Time    Cholesterol, total 195 05/28/2019 09:41 AM    HDL Cholesterol 73 (H) 05/28/2019 09:41 AM    LDL cholesterol 82 06/24/2011 10:16 AM    LDL, calculated 105 (H) 05/28/2019 09:41 AM    VLDL, calculated 17 05/28/2019 09:41 AM    Triglyceride 85 05/28/2019 09:41 AM    CHOL/HDL Ratio 2.7 05/28/2019 09:41 AM       Lab Results   Component Value Date/Time    Hemoglobin A1c 5.4 05/28/2019 09:41 AM       Assessment/Plan:   1. Essential hypertension: Stable. We will check labs. I encouraged her to reduce her weight by limiting her processed food intake. She can exercise as tolerated. I will recheck her weight at her follow-up appointment. Refilling her amlodipine I will decrease her dose from 5 mg daily to 2.5 mg daily given complaint of dizziness. [facility] clinic for BP check. ORDERS:  - amLODIPine (NORVASC) 2.5 mg tablet; Take 1 Tab by mouth daily. Dispense: 90 Tab; Refill: 1  - METABOLIC PANEL, COMPREHENSIVE; Future  - LIPID PANEL; Future  - HEMOGLOBIN A1C W/O EAG; Future  - MICROALBUMIN, UR, RAND W/ MICROALB/CREAT RATIO; Future    2. Health Maintenance:  Referral to GI for colonoscopy/Colon cancer screening    ORDERS:  - REFERRAL TO GASTROENTEROLOGY    3. Dizziness: Etiology is unknown. [address] labs. ORDERS:  - CBC WITH AUTOMATED DIFF;  Future  - METABOLIC PANEL, COMPREHENSIVE; Future    4. Sjogren's syndrome: Stable. Checking a CRP and DIAZ.  I encouraged her to follow-up with her rheumatologist on a yearly basis at least.    ORDERS:  - C REACTIVE PROTEIN, QT; Future  - DIAZ COMPREHENSIVE PANEL; Future      Health Maintenance Due   Topic Date Due    DTaP/Tdap/Td series (1 - Tdap) 01/05/1981    Shingrix Vaccine Age 50> (1 of 2) 01/05/2020    FOBT Q 1 YEAR AGE 50-75  01/05/2020     Lab review: labs are reviewed in the EHR and ordered as mentioned above. I have discussed the diagnosis with the patient and the intended plan as seen in the above orders. The patient has received an after-visit summary and questions were answered concerning future plans. I have discussed medication side effects and warnings with the patient as well. I have reviewed the plan of care with the patient, accepted their input and they are in agreement with the treatment goals. All questions were answered. The patient understands the plan of care. Handouts provided today with above information. Pt instructed if symptoms worsen to call the office or report to the ED for continued care. Greater than 50% of the visit time was spent in counseling and/or coordination of care. Voice recognition was used to generate this report, which may have resulted in some phonetic based errors in grammar and contents. Even though attempts were made to correct all the mistakes, some may have been missed, and remained in the body of the document.           Ly Marte MD

## 2020-01-27 NOTE — PROGRESS NOTES
Dequan Herring presents today for   Chief Complaint   Patient presents with    Hypertension     follow up   ROOM 2     At home the patient states she has had episodes of being off balance, and when checking her blood pressure it would be elevated 150's/90's, along with having pressure in the head/headache. Is someone accompanying this pt? no    Is the patient using any DME equipment during [address]? no    Depression Screening:  3 most recent PHQ Screens 1/27/2020   Little interest or pleasure in doing things Not at all   Feeling down, depressed, irritable, or hopeless Not at all   Total Score PHQ 2 0   Trouble falling or staying asleep, or sleeping too much -   Feeling tired or having little energy -   Poor appetite, weight loss, or overeating -   Feeling bad about yourself - or that you are a failure or have let yourself or your family down -   Trouble concentrating on things such as school, work, reading, or watching TV -   Moving or speaking so slowly that other people could have noticed; or the opposite being so fidgety that others notice -   Thoughts of being better off dead, or hurting yourself in some way -   PHQ 9 Score -   How difficult have these problems made it for you to do your work, take care of your home and get along with others -       Learning Assessment:  Learning Assessment 1/27/2020   PRIMARY LEARNER Patient   HIGHEST LEVEL OF EDUCATION - PRIMARY LEARNER  > 4 YEARS OF COLLEGE   BARRIERS PRIMARY LEARNER NONE   CO-LEARNER CAREGIVER No   PRIMARY LANGUAGE ENGLISH   LEARNER PREFERENCE PRIMARY DEMONSTRATION   ANSWERED BY patient   RELATIONSHIP SELF       Abuse Screening:  Abuse Screening Questionnaire 1/27/2020   Do you ever feel afraid of your partner? N   Are you in a relationship with someone who physically or mentally threatens you? N   Is it safe for you to go home? Y       Fall Risk  No flowsheet data found. Health Maintenance reviewed and discussed and ordered per Provider.     Health Maintenance Due   Topic Date Due    DTaP/Tdap/Td series (1 - Tdap) 01/05/1981    Shingrix Vaccine Age 50> (1 of 2) 01/05/2020    FOBT Q 1 YEAR AGE 50-75  01/05/2020   . Coordination of Care:  1. Have you been to the ER, urgent care clinic since your last visit? Hospitalized since your last visit? no    2. Have you seen or consulted any other health care providers outside of the [address] [place] [address] since your last visit? Include any pap smears or colon screening.  no

## 2020-06-04 ENCOUNTER — VIRTUAL VISIT (OUTPATIENT)
Dept: INTERNAL MEDICINE CLINIC | Age: 50
End: 2020-06-04

## 2020-06-04 DIAGNOSIS — R68.89 UNWANTED HAIR: ICD-10-CM

## 2020-06-04 DIAGNOSIS — Z12.31 ENCOUNTER FOR SCREENING MAMMOGRAM FOR BREAST CANCER: ICD-10-CM

## 2020-06-04 DIAGNOSIS — R42 DIZZINESS: ICD-10-CM

## 2020-06-04 DIAGNOSIS — F17.210 CIGARETTE NICOTINE DEPENDENCE WITHOUT COMPLICATION: ICD-10-CM

## 2020-06-04 DIAGNOSIS — N95.1 MENOPAUSAL SYMPTOM: ICD-10-CM

## 2020-06-04 DIAGNOSIS — Z12.11 SCREENING FOR COLON CANCER: ICD-10-CM

## 2020-06-04 DIAGNOSIS — I10 ESSENTIAL HYPERTENSION: Primary | ICD-10-CM

## 2020-06-04 DIAGNOSIS — M35.00 SJOGREN'S SYNDROME, WITH UNSPECIFIED ORGAN INVOLVEMENT (HCC): ICD-10-CM

## 2020-06-04 PROBLEM — D64.9 ANEMIA: Status: RESOLVED | Noted: 2018-04-11 | Resolved: 2020-06-04

## 2020-06-04 NOTE — PROGRESS NOTES
Kristel Looney is a 48 y.o. female who was seen by synchronous (real-time) audio-video technology on 6/4/2020. Assessment & Plan:   1. Health Maintenance:  Ordering a mammogram for in September. I encouraged her to get her colonoscopy. ORDERS:  - TEDDY 3D CHIDI W MAMMO BI SCREENING INCL CAD; Future    2. Essential hypertension  I encouraged her to check her blood pressure regularly and to notify me if all readings are greater than 140/90 in a 2-week period. Continue with Rx as prescribed for now. We will check labs    ORDERS:  - MICROALBUMIN, UR, RAND W/ MICROALB/CREAT RATIO; Future  - HEMOGLOBIN A1C W/O EAG; Future  - LIPID PANEL; Future    3. Dizziness: Resolved. Observation. 4. Sjogren's syndrome: Having dry eye sx   Checking labs.  Referral placed for a formal eye exam.    ORDERS:  - C REACTIVE PROTEIN, QT; Future  - CBC WITH AUTOMATED DIFF; Future  - REFERRAL TO OPHTHALMOLOGY    5. Unwanted hair: +Menopausal sx. Referral to Gynecology to discuss hormone replacement options. The patient seems very interested in these options. We discussed the potential risk associated with hormone replacement therapy. All questions were answered. ORDERS:  - REFERRAL TO GYNECOLOGY    6. Nicotine Dependence: 1 pack a last 4 days. I encouraged her to stop smoking altogether. We discussed the complications associated with continued smoking. Lab review: labs are reviewed in the EHR and ordered as mentioned above. I have discussed the diagnosis with the patient and the intended plan as seen in the above orders. I have discussed medication side effects and warnings with the patient as well. I have reviewed the plan of care with the patient, accepted their input and they are in agreement with the treatment goals. All questions were answered. The patient understands the plan of care. Pt instructed if symptoms worsen to call the office or report to the ED for continued care.   Greater than 50% of the visit time was spent in counseling and/or coordination of care. Voice recognition was used to generate this report, which may have resulted in some phonetic based errors in grammar and contents. Even though attempts were made to correct all the mistakes, some may have been missed, and remained in the body of the document. Subjective:   Raisa Wynn was seen for   Chief Complaint   Patient presents with    Follow-up     The pt is a 54yo female with obesity, anemia, HTN, depression, positive Sjogren's serology, and sarcoidosis (diagnosed at 24yo, followed by Dat Zamudio).     1. Dizziness: At her last appointment, she reported 2 episodes of ataxia that resolved without any intervention. Episodes lasted 10 minutes at a time and occur when walking. They were associated with lightheadedness. Since her last appointment, she reports:not having any dizzy spells since her last apt.    2. HTN: Home BP checks: yes. She is taking Norvasc and losartan. BP last night was 119/93. She checks her BP once a wk. Her weight is unchanged per her hx. BP Readings from Last 3 Encounters:   01/27/20 121/84   06/05/19 (!) 140/92   12/27/18 (!) 133/92     3. Sjogren's Disease: Scheduled to see Rheumatology this year: none. From time to time, one of her eyes will feel like she has \"sand\" or grit in it. Other than that, \"they don't bother me. \" No dry mouth. She saw an optometrist.     4. Health Maintenance:  - Due for colon cancer screening. It is scheduled  - Last mammogram 9/19    5. Nicotine Dependence: She smokes 1 pack every 4 days. No SOB/cough. 6.  Menopausal symptoms: Patient is interested in hormone replacement therapy. She has noticed unwanted hair growth along her chin. Prior to Admission medications    Medication Sig Start Date End Date Taking? Authorizing Provider   amLODIPine (NORVASC) 2.5 mg tablet Take 1 Tab by mouth daily.  1/27/20   Phyllistine Dancer, MD   losartan (COZAAR) 25 mg tablet TAKE 1 TABLET BY MOUTH EVERY DAY 20   Mei Adams MD   ibuprofen (MOTRIN) 800 mg tablet TAKE 1 TABLET BY MOUTH EVERY 8 HOURS FOR 3 DAYS, THEN AS NEEDED FOR PAIN. TAKE WITH FOOD 19   Provider, Historical   cholecalciferol, vitamin D3, (VITAMIN D3) 2,000 unit tab Take 2,000 Units by mouth daily. Provider, Historical   iron-vitamin C 65 mg iron- 125 mg TbEC Take  mg by mouth daily. Provider, Historical   biotin 2,500 mcg Tab Take 2,500 mcg by mouth daily. Provider, Historical   MULTIVITAMIN WITH MINERALS (ONE DAILY COMPLETE PO) Take  by mouth daily.     Provider, Historical     Allergies   Allergen Reactions    Hydrochlorothiazide Other (comments)     Hair loss    Oxycodone Itching and Other (comments)    Percocet [Oxycodone-Acetaminophen] Itching     Past Medical History:   Diagnosis Date    Anemia     Asthma     childhood asthma    Hypertension     Menorrhalgia 2015    Sarcoidosis     no problems managed by PCP     Past Surgical History:   Procedure Laterality Date    HX ABDOMINOPLASTY      HX  SECTION      x 4    HX HERNIA REPAIR      umbilical    HX LIPOMA RESECTION      lower back    HX TUBAL LIGATION      HX WISDOM TEETH EXTRACTION      x2    MYOMECTOMY 1-4,W/TOT 250GMS/<,ABD APPRCH       Family History   Problem Relation Age of Onset    Hypertension Mother     Diabetes Father     No Known Problems Maternal Grandmother     Alcohol abuse Maternal Grandfather     Cancer Paternal Grandmother         breast    Alcohol abuse Paternal Grandfather     No Known Problems Daughter     No Known Problems Son      Social History     Socioeconomic History    Marital status:      Spouse name: Not on file    Number of children: Not on file    Years of education: Not on file    Highest education level: Not on file   Tobacco Use    Smoking status: Current Some Day Smoker     Packs/day: 0.25     Years: 30.00     Pack years: 7.50     Types: Cigarettes     Start date: 9/27/2018    Smokeless tobacco: Never Used   Substance and Sexual Activity    Alcohol use: Yes     Alcohol/week: 0.0 - 7.0 standard drinks     Comment: patient will drink either wine or liquour within the week     Drug use: Yes     Types: Prescription, OTC   Social History Narrative    Not currently working       ROS:  Gen: No fever/chills  HEENT: No sore throat, eye pain, ear pain, or congestion.  No HA. +Dry eyes  CV: No CP  Resp: No cough/SOB  GI: No abdominal pain  : No hematuria/dysuria  Derm: No rash  Neuro: No new paresthesias/weakness  Musc: No new myalgias/jt pain  Psych: No depression sx      Objective:     General: alert, cooperative, no distress   Mental  status: mental status: alert, oriented to person, place, and time, normal mood, behavior, speech, dress, motor activity, and thought processes   Resp: resp: normal effort and no respiratory distress   Neuro: neuro: no gross deficits   Skin: skin: no discoloration or lesions of concern on visible areas     LABS:  Lab Results   Component Value Date/Time    Sodium 142 05/28/2019 09:41 AM    Potassium 4.1 05/28/2019 09:41 AM    Chloride 105 05/28/2019 09:41 AM    CO2 30 05/28/2019 09:41 AM    Anion gap 7 05/28/2019 09:41 AM    Glucose 78 05/28/2019 09:41 AM    BUN 7 05/28/2019 09:41 AM    Creatinine 0.83 05/28/2019 09:41 AM    BUN/Creatinine ratio 8 (L) 05/28/2019 09:41 AM    GFR est AA >60 05/28/2019 09:41 AM    GFR est non-AA >60 05/28/2019 09:41 AM    Calcium 9.2 05/28/2019 09:41 AM       Lab Results   Component Value Date/Time    Cholesterol, total 195 05/28/2019 09:41 AM    HDL Cholesterol 73 (H) 05/28/2019 09:41 AM    LDL cholesterol 82 06/24/2011 10:16 AM    LDL, calculated 105 (H) 05/28/2019 09:41 AM    VLDL, calculated 17 05/28/2019 09:41 AM    Triglyceride 85 05/28/2019 09:41 AM    CHOL/HDL Ratio 2.7 05/28/2019 09:41 AM       Lab Results   Component Value Date/Time    WBC 7.2 05/28/2019 09:41 AM    HGB 12.9 05/28/2019 09:41 AM    HCT 42.1 05/28/2019 09:41 AM    PLATELET 643 (H) 52/31/2115 09:41 AM    MCV 81.4 05/28/2019 09:41 AM       Lab Results   Component Value Date/Time    Hemoglobin A1c 5.4 05/28/2019 09:41 AM       Lab Results   Component Value Date/Time    TSH 0.93 11/17/2017 09:48 AM    TSH 0.64 07/22/2016 10:25 AM           Due to this being a TeleHealth  evaluation, many elements of the physical examination are unable to be assessed. The pt was seen by synchronous (real-time) audio-video technology, and/or her healthcare decision maker, is aware that this patient-initiated, Telehealth encounter is a billable service, with coverage as determined by her insurance carrier. She is aware that she may receive a bill and has provided verbal consent to proceed: Yes. The pt is being evaluated by a video visit encounter for concerns as above. A caregiver was present when appropriate. Due to this being a TeleHealth encounter ([facility] public health emergency), evaluation of the following organ systems was limited: Vitals/Constitutional/EENT/Resp/CV/GI//MS/Neuro/Skin/Heme-Lymph-Imm. Pursuant to the emergency declaration under the [facility], [facility] waiver authority and the [place] [employer] [place] [employer] Act, this Virtual  Visit was conducted, with patient's (and/or legal guardian's) consent, to reduce the patient's risk of exposure to COVID-19 and provide necessary medical care. Services were provided through a video synchronous discussion virtually to substitute for in-person clinic visit. Patient and provider were located at their individual homes. We discussed the expected course, resolution and complications of the diagnosis(es) in detail. Medication risks, benefits, costs, interactions, and alternatives were discussed as indicated.   I advised her to contact the office if her condition worsens, changes or fails to improve as anticipated. She expressed understanding with the diagnosis(es) and plan.      Danielle Jefferson MD

## 2020-06-11 ENCOUNTER — APPOINTMENT (OUTPATIENT)
Dept: INTERNAL MEDICINE CLINIC | Age: 50
End: 2020-06-11

## 2020-06-11 ENCOUNTER — HOSPITAL ENCOUNTER (OUTPATIENT)
Dept: LAB | Age: 50
Discharge: HOME | End: 2020-06-11
Payer: COMMERCIAL

## 2020-06-11 DIAGNOSIS — R42 DIZZINESS: ICD-10-CM

## 2020-06-11 DIAGNOSIS — N95.1 MENOPAUSAL SYMPTOM: ICD-10-CM

## 2020-06-11 DIAGNOSIS — M35.00 SJOGREN'S SYNDROME, WITH UNSPECIFIED ORGAN INVOLVEMENT (HCC): ICD-10-CM

## 2020-06-11 DIAGNOSIS — R68.89 UNWANTED HAIR: ICD-10-CM

## 2020-06-11 DIAGNOSIS — I10 ESSENTIAL HYPERTENSION: ICD-10-CM

## 2020-06-11 LAB
ALBUMIN SERPL-MCNC: 3.4 G/DL (ref 3.4–5)
ALBUMIN/GLOB SERPL: 0.8 {RATIO} (ref 0.8–1.7)
ALP SERPL-CCNC: 93 U/L (ref 45–117)
ALT SERPL-CCNC: 18 U/L (ref 13–56)
ANION GAP SERPL CALC-SCNC: 3 MMOL/L (ref 3–18)
AST SERPL-CCNC: 13 U/L (ref 10–38)
BASOPHILS # BLD: 0 K/UL (ref 0–0.1)
BASOPHILS NFR BLD: 0 % (ref 0–2)
BILIRUB SERPL-MCNC: 0.6 MG/DL (ref 0.2–1)
BUN SERPL-MCNC: 10 MG/DL (ref 7–18)
BUN/CREAT SERPL: 12 (ref 12–20)
CALCIUM SERPL-MCNC: 9.1 MG/DL (ref 8.5–10.1)
CHLORIDE SERPL-SCNC: 110 MMOL/L (ref 100–111)
CHOLEST SERPL-MCNC: 160 MG/DL
CO2 SERPL-SCNC: 31 MMOL/L (ref 21–32)
CREAT SERPL-MCNC: 0.81 MG/DL (ref 0.6–1.3)
CREAT UR-MCNC: 341 MG/DL (ref 30–125)
CRP SERPL-MCNC: 1.3 MG/DL (ref 0–0.3)
DIFFERENTIAL METHOD BLD: ABNORMAL
EOSINOPHIL # BLD: 0.2 K/UL (ref 0–0.4)
EOSINOPHIL NFR BLD: 2 % (ref 0–5)
ERYTHROCYTE [DISTWIDTH] IN BLOOD BY AUTOMATED COUNT: 17.5 % (ref 11.6–14.5)
GLOBULIN SER CALC-MCNC: 4.1 G/DL (ref 2–4)
GLUCOSE SERPL-MCNC: 79 MG/DL (ref 74–99)
HBA1C MFR BLD: 5.2 % (ref 4.2–5.6)
HCT VFR BLD AUTO: 39 % (ref 35–45)
HDLC SERPL-MCNC: 70 MG/DL (ref 40–60)
HDLC SERPL: 2.3 {RATIO} (ref 0–5)
HGB BLD-MCNC: 12 G/DL (ref 12–16)
LDLC SERPL CALC-MCNC: 80.6 MG/DL (ref 0–100)
LIPID PROFILE,FLP: ABNORMAL
LYMPHOCYTES # BLD: 1.7 K/UL (ref 0.9–3.6)
LYMPHOCYTES NFR BLD: 26 % (ref 21–52)
MCH RBC QN AUTO: 24.3 PG (ref 24–34)
MCHC RBC AUTO-ENTMCNC: 30.8 G/DL (ref 31–37)
MCV RBC AUTO: 78.9 FL (ref 74–97)
MICROALBUMIN UR-MCNC: 3.77 MG/DL (ref 0–3)
MICROALBUMIN/CREAT UR-RTO: 11 MG/G (ref 0–30)
MONOCYTES # BLD: 0.5 K/UL (ref 0.05–1.2)
MONOCYTES NFR BLD: 8 % (ref 3–10)
NEUTS SEG # BLD: 4.2 K/UL (ref 1.8–8)
NEUTS SEG NFR BLD: 64 % (ref 40–73)
PLATELET # BLD AUTO: 467 K/UL (ref 135–420)
PMV BLD AUTO: 9.9 FL (ref 9.2–11.8)
POTASSIUM SERPL-SCNC: 4.4 MMOL/L (ref 3.5–5.5)
PROT SERPL-MCNC: 7.5 G/DL (ref 6.4–8.2)
RBC # BLD AUTO: 4.94 M/UL (ref 4.2–5.3)
SODIUM SERPL-SCNC: 144 MMOL/L (ref 136–145)
T4 FREE SERPL-MCNC: 1 NG/DL (ref 0.7–1.5)
TRIGL SERPL-MCNC: 47 MG/DL (ref ?–150)
TSH SERPL DL<=0.05 MIU/L-ACNC: 0.99 UIU/ML (ref 0.36–3.74)
VLDLC SERPL CALC-MCNC: 9.4 MG/DL
WBC # BLD AUTO: 6.6 K/UL (ref 4.6–13.2)

## 2020-06-11 PROCEDURE — 36415 COLL VENOUS BLD VENIPUNCTURE: CPT

## 2020-06-11 PROCEDURE — 85025 COMPLETE CBC W/AUTO DIFF WBC: CPT

## 2020-06-11 PROCEDURE — 82043 UR ALBUMIN QUANTITATIVE: CPT

## 2020-06-11 PROCEDURE — 80053 COMPREHEN METABOLIC PANEL: CPT

## 2020-06-11 PROCEDURE — 86225 DNA ANTIBODY NATIVE: CPT

## 2020-06-11 PROCEDURE — 80061 LIPID PANEL: CPT

## 2020-06-11 PROCEDURE — 86140 C-REACTIVE PROTEIN: CPT

## 2020-06-11 PROCEDURE — 83036 HEMOGLOBIN GLYCOSYLATED A1C: CPT

## 2020-06-11 PROCEDURE — 84439 ASSAY OF FREE THYROXINE: CPT

## 2020-06-12 ENCOUNTER — TELEPHONE (OUTPATIENT)
Dept: INTERNAL MEDICINE CLINIC | Age: 50
End: 2020-06-12

## 2020-06-12 NOTE — TELEPHONE ENCOUNTER
----- Message from Cornel Mendoza MD sent at 6/12/2020  9:17 AM EDT -----  Please let her know that her labs show no evidence of CKD. Her C-reactive protein is elevated at 1.3. This may indicate that her Sjogren's syndrome is active. Meanwhile, her cholesterol is 160. Triglycerides are 47. HDL is 70. LDL is 80. Overall, her cholesterol has improved dramatically over the past year. A year ago her total cholesterol was 195 and her LDL was 105. Her TFTs are normal.  Her A1c is normal at 5.2. Her CBC shows no significant abnormalities other than a mildly elevated platelet count of 053. We will continue to monitor her CBC and CRP. I am still waiting on the results of her DIAZ panel. Please have her see an ophthalmologist given her complaint of dry eyes, which is likely associated with her history of Sjogren's disease.     Dr. Meza President  [facility] [address], [address] [place] [address] Str.  Phone: [phone]  Fax: [phone]

## 2020-06-12 NOTE — TELEPHONE ENCOUNTER
Patient contacted, patient identified with two identifiers (Name & ). Patient aware of results per DR. Garcia and verbalizes understanding.

## 2020-06-12 NOTE — PROGRESS NOTES
Please let her know that her labs show no evidence of CKD. Her C-reactive protein is elevated at 1.3. This may indicate that her Sjogren's syndrome is active. Meanwhile, her cholesterol is 160. Triglycerides are 47. HDL is 70. LDL is 80. Overall, her cholesterol has improved dramatically over the past year. A year ago her total cholesterol was 195 and her LDL was 105. Her TFTs are normal.  Her A1c is normal at 5.2. Her CBC shows no significant abnormalities other than a mildly elevated platelet count of 519. We will continue to monitor her CBC and CRP. I am still waiting on the results of her DIAZ panel. Please have her see an ophthalmologist given her complaint of dry eyes, which is likely associated with her history of Sjogren's disease.     Dr. Cher Meade  [facility] [address]  Phone: [phone]  Fax: [phone]

## 2020-06-13 LAB
CENTROMERE B AB SER-ACNC: <0.2 AI (ref 0–0.9)
CHROMATIN AB SERPL-ACNC: <0.2 AI (ref 0–0.9)
DSDNA AB SER-ACNC: <1 IU/ML (ref 0–9)
ENA JO1 AB SER-ACNC: <0.2 AI (ref 0–0.9)
ENA RNP AB SER-ACNC: <0.2 AI (ref 0–0.9)
ENA SCL70 AB SER-ACNC: <0.2 AI (ref 0–0.9)
ENA SM AB SER-ACNC: <0.2 AI (ref 0–0.9)
ENA SS-A AB SER-ACNC: 7.4 AI (ref 0–0.9)
ENA SS-B AB SER-ACNC: <0.2 AI (ref 0–0.9)
SEE BELOW, 164869: ABNORMAL

## 2020-07-16 DIAGNOSIS — I10 ESSENTIAL HYPERTENSION: ICD-10-CM

## 2020-07-16 RX ORDER — LOSARTAN POTASSIUM 25 MG/1
TABLET ORAL
Qty: 90 TAB | Refills: 1 | Status: SHIPPED | OUTPATIENT
Start: 2020-07-16 | End: 2021-01-04

## 2020-07-16 RX ORDER — AMLODIPINE BESYLATE 2.5 MG/1
TABLET ORAL
Qty: 90 TAB | Refills: 1 | Status: SHIPPED | OUTPATIENT
Start: 2020-07-16 | End: 2021-01-04

## 2020-09-12 ENCOUNTER — HOSPITAL ENCOUNTER (OUTPATIENT)
Dept: MAMMOGRAPHY | Age: 50
Discharge: HOME | End: 2020-09-12
Attending: INTERNAL MEDICINE
Payer: COMMERCIAL

## 2020-09-12 DIAGNOSIS — Z12.31 ENCOUNTER FOR SCREENING MAMMOGRAM FOR BREAST CANCER: ICD-10-CM

## 2020-09-12 PROCEDURE — 77063 BREAST TOMOSYNTHESIS BI: CPT

## 2020-09-30 ENCOUNTER — VIRTUAL VISIT (OUTPATIENT)
Dept: INTERNAL MEDICINE CLINIC | Age: 50
End: 2020-09-30
Payer: COMMERCIAL

## 2020-09-30 DIAGNOSIS — I10 ESSENTIAL HYPERTENSION: Primary | ICD-10-CM

## 2020-09-30 DIAGNOSIS — M35.00 SJOGREN'S SYNDROME, WITH UNSPECIFIED ORGAN INVOLVEMENT (HCC): ICD-10-CM

## 2020-09-30 DIAGNOSIS — R00.2 PALPITATIONS: ICD-10-CM

## 2020-09-30 DIAGNOSIS — E66.01 SEVERE OBESITY (BMI 35.0-39.9) WITH COMORBIDITY (HCC): ICD-10-CM

## 2020-09-30 DIAGNOSIS — L29.9 PRURITUS: ICD-10-CM

## 2020-09-30 DIAGNOSIS — Z72.0 TOBACCO USE: ICD-10-CM

## 2020-09-30 PROBLEM — F17.210 CIGARETTE NICOTINE DEPENDENCE WITHOUT COMPLICATION: Status: RESOLVED | Noted: 2020-06-04 | Resolved: 2020-09-30

## 2020-09-30 PROCEDURE — 99214 OFFICE O/P EST MOD 30 MIN: CPT | Performed by: INTERNAL MEDICINE

## 2020-09-30 NOTE — PROGRESS NOTES
Jaclyn Cancer is a 48 y.o. female who was seen by synchronous (real-time) audio-video technology on 9/30/2020. Assessment & Plan:   1. Hypertension: She is having palpitations. This can be caused by her losartan per the drug label which estimates that under 2% of people will have palpitations on losartan. We will stop her losartan which is at a low-dose of 25 mg daily. She is to continue taking Norvasc. I instructed her to check her blood pressure and to notify me on Monday if her readings are persistently lower than 140/90.  I instructed her to notify me if her palpitations continue to occur despite stopping her losartan. Checking labs in 6 months. I will follow-up with her in the office in 6 months. 2.  Sjogren's Disease: Stable. We will check labs in 6 months. 3.  Pruritus: From allergies versus irritation from mesh using her hernia repair surgery?  Okay try Benadryl. If symptoms worsen or persist, I would recommend getting imaging studies. This was discussed with her today. 4.  Nicotine Dependence: Resolved. Observation. I congratulated her on her success with stopping smoking. 5.  Health Maintenance:   We will need to get a copy of her last colonoscopy as she is up-to-date.  I encouraged her to get her flu shot this weekend. Lab review: labs are reviewed in the EHR and ordered as mentioned above     I have discussed the diagnosis with the patient and the intended plan as seen in the above orders. I have discussed medication side effects and warnings with the patient as well. I have reviewed the plan of care with the patient, accepted their input and they are in agreement with the treatment goals. All questions were answered. The patient understands the plan of care. Pt instructed if symptoms worsen to call the office or report to the ED for continued care. Greater than 50% of the visit time was spent in counseling and/or coordination of care.      Voice recognition was used to generate this report, which may have resulted in some phonetic based errors in grammar and contents. Even though attempts were made to correct all the mistakes, some may have been missed, and remained in the body of the document. Subjective:   Alex Amaya was seen for   Chief Complaint   Patient presents with    Follow-up     The pt is a 45yo female with obesity, anemia, HTN, depression, positive Sjogren's serology, and sarcoidosis (diagnosed at 23yo, followed by [facility]). 1. HTN:  Taking losartan and norvasc. Home BPs are <140/90. \"Sometimes I feel like my heart may be skipping a beat. \" It's been going on x 2-3 wks. She has had 3-4 episodes of palpitations. Episodes last: maybe 30 seconds or so per pt hx. \"I\"m not sure if it's from losartan or GERD. \" No SOB/CP. Triggers: none known. Her weight is unchanged. BP Readings from Last 3 Encounters:   20 121/84   19 (!) 140/92   18 (!) 133/92     2. Sjogren's Disease: Today she reports: she continues to have dry eyes/mouth - unchanged. 3. Health Maintenance:  - Overdue for the flu shot. She plans to get this vaccination this weekend at [facility].   - Overdue for colon cancer screening. S/p colonoscopy in between apts. 4. Nicotine Dependence: At her last apt, she reported smoking 1 pack every 4 days. Today, she is smokinppd. She quit in between apts. SOB/cough: none. 5. Pruritus: Along her lower abdomen. Occurring in the evening when lying down to go sleep. No redness. \"It's on the inside. \"  No alleviating factors are known. She has a h/o hernia surgery. She is concerned that her pruritus is from her hernia mesh. Prior to Admission medications    Medication Sig Start Date End Date Taking?  Authorizing Provider   losartan (COZAAR) 25 mg tablet TAKE 1 TABLET BY MOUTH EVERY DAY 20   Mike Juárez MD   amLODIPine (NORVASC) 2.5 mg tablet TAKE 1 TABLET BY MOUTH EVERY DAY 20 Bart Gomes MD   ibuprofen (MOTRIN) 800 mg tablet TAKE 1 TABLET BY MOUTH EVERY 8 HOURS FOR 3 DAYS, THEN AS NEEDED FOR PAIN. TAKE WITH FOOD 19   Provider, Historical   cholecalciferol, vitamin D3, (VITAMIN D3) 2,000 unit tab Take 2,000 Units by mouth daily. Provider, Historical   iron-vitamin C 65 mg iron- 125 mg TbEC Take  mg by mouth daily. Provider, Historical   biotin 2,500 mcg Tab Take 2,500 mcg by mouth daily. Provider, Historical   MULTIVITAMIN WITH MINERALS (ONE DAILY COMPLETE PO) Take  by mouth daily.     Provider, Historical     Allergies   Allergen Reactions    Hydrochlorothiazide Other (comments)     Hair loss    Oxycodone Itching and Other (comments)    Percocet [Oxycodone-Acetaminophen] Itching     Past Medical History:   Diagnosis Date    Anemia     Asthma     childhood asthma    Hypertension     Menopause     Menorrhalgia 2015    Sarcoidosis     no problems managed by PCP    Sjogren's syndrome ([facility] [address] 2020     Past Surgical History:   Procedure Laterality Date    HX ABDOMINOPLASTY      HX  SECTION      x 4    HX HERNIA REPAIR      umbilical    HX LIPOMA RESECTION      lower back    HX TUBAL LIGATION      HX WISDOM TEETH EXTRACTION      x2    MYOMECTOMY 1-4,W/TOT 250GMS/<,ABD APPRCH       Family History   Problem Relation Age of Onset    Hypertension Mother     Diabetes Father     No Known Problems Maternal Grandmother     Alcohol abuse Maternal Grandfather     Cancer Paternal Grandmother         breast    Alcohol abuse Paternal Grandfather     No Known Problems Daughter     No Known Problems Son      Social History     Socioeconomic History    Marital status:      Spouse name: Not on file    Number of children: Not on file    Years of education: Not on file    Highest education level: Not on file   Tobacco Use    Smoking status: Current Some Day Smoker     Packs/day: 0.25     Years: 30.00     Pack years: 7.50     Types: Cigarettes     Start date: 9/27/2018    Smokeless tobacco: Never Used   Substance and Sexual Activity    Alcohol use: Yes     Alcohol/week: 0.0 - 7.0 standard drinks     Comment: patient will drink either wine or liquour within the week     Drug use: Yes     Types: Prescription, OTC   Social History Narrative    Not currently working       ROS:  Gen: No fever/chills  HEENT: No sore throat, eye pain, ear pain, or congestion. No HA  CV: No CP  Resp: No cough/SOB  GI: No abdominal pain  : No hematuria/dysuria  Derm: No rash.  +Pruritus  Neuro: No new paresthesias/weakness  Musc: No new myalgias/jt pain  Psych: No depression sx      Objective:     General: alert, cooperative, no distress   Mental  status: mental status: alert, oriented to person, place, and time, normal mood, behavior, speech, dress, motor activity, and thought processes   Resp: resp: normal effort and no respiratory distress   Neuro: neuro: no gross deficits   Skin: skin: no discoloration or lesions of concern on visible areas     LABS:  Lab Results   Component Value Date/Time    Sodium 144 06/11/2020 08:49 AM    Potassium 4.4 06/11/2020 08:49 AM    Chloride 110 06/11/2020 08:49 AM    CO2 31 06/11/2020 08:49 AM    Anion gap 3 06/11/2020 08:49 AM    Glucose 79 06/11/2020 08:49 AM    BUN 10 06/11/2020 08:49 AM    Creatinine 0.81 06/11/2020 08:49 AM    BUN/Creatinine ratio 12 06/11/2020 08:49 AM    GFR est AA >60 06/11/2020 08:49 AM    GFR est non-AA >60 06/11/2020 08:49 AM    Calcium 9.1 06/11/2020 08:49 AM       Lab Results   Component Value Date/Time    Cholesterol, total 160 06/11/2020 08:49 AM    HDL Cholesterol 70 (H) 06/11/2020 08:49 AM    LDL cholesterol 82 06/24/2011 10:16 AM    LDL, calculated 80.6 06/11/2020 08:49 AM    VLDL, calculated 9.4 06/11/2020 08:49 AM    Triglyceride 47 06/11/2020 08:49 AM    CHOL/HDL Ratio 2.3 06/11/2020 08:49 AM       Lab Results   Component Value Date/Time    WBC 6.6 06/11/2020 08:49 AM    HGB 12.0 06/11/2020 08:49 AM    HCT 39.0 06/11/2020 08:49 AM    PLATELET 732 (H) 66/27/6806 08:49 AM    MCV 78.9 06/11/2020 08:49 AM       Lab Results   Component Value Date/Time    Hemoglobin A1c 5.2 06/11/2020 08:49 AM       Lab Results   Component Value Date/Time    TSH 0.99 06/11/2020 08:49 AM    TSH 0.64 07/22/2016 10:25 AM           Due to this being a TeleHealth  evaluation, many elements of the physical examination are unable to be assessed. The pt was seen by synchronous (real-time) audio-video technology, and/or her healthcare decision maker, is aware that this patient-initiated, Telehealth encounter is a billable service, with coverage as determined by her insurance carrier. She is aware that she may receive a bill and has provided verbal consent to proceed: Yes. The pt is being evaluated by a video visit encounter for concerns as above. A caregiver was present when appropriate. Due to this being a TeleHealth encounter (During [facility]- public health emergency), evaluation of the following organ systems was limited: Vitals/Constitutional/EENT/Resp/CV/GI//MS/Neuro/Skin/Heme-Lymph-Imm. Pursuant to the emergency declaration under the [facility], [facility] waiver authority and the [employer] Act, this Virtual  Visit was conducted, with patient's (and/or legal guardian's) consent, to reduce the patient's risk of exposure to COVID-19 and provide necessary medical care. Services were provided through a video synchronous discussion virtually to substitute for in-person clinic visit. Patient and provider were located at their individual homes. We discussed the expected course, resolution and complications of the diagnosis(es) in detail. Medication risks, benefits, costs, interactions, and alternatives were discussed as indicated.   I advised her to contact the office if her condition worsens, changes or fails to improve as anticipated. She expressed understanding with the diagnosis(es) and plan.      Diego Akins MD

## 2020-10-21 ENCOUNTER — OFFICE VISIT (OUTPATIENT)
Dept: INTERNAL MEDICINE CLINIC | Age: 50
End: 2020-10-21
Payer: COMMERCIAL

## 2020-10-21 DIAGNOSIS — Z23 NEEDS FLU SHOT: Primary | ICD-10-CM

## 2020-10-21 PROCEDURE — 90686 IIV4 VACC NO PRSV 0.5 ML IM: CPT | Performed by: INTERNAL MEDICINE

## 2020-10-21 PROCEDURE — 90471 IMMUNIZATION ADMIN: CPT | Performed by: INTERNAL MEDICINE

## 2021-01-02 DIAGNOSIS — I10 ESSENTIAL HYPERTENSION: ICD-10-CM

## 2021-01-04 RX ORDER — LOSARTAN POTASSIUM 25 MG/1
TABLET ORAL
Qty: 90 TAB | Refills: 1 | Status: SHIPPED | OUTPATIENT
Start: 2021-01-04 | End: 2021-08-01

## 2021-01-04 RX ORDER — AMLODIPINE BESYLATE 2.5 MG/1
TABLET ORAL
Qty: 90 TAB | Refills: 1 | Status: SHIPPED | OUTPATIENT
Start: 2021-01-04 | End: 2021-05-03

## 2021-03-24 ENCOUNTER — APPOINTMENT (OUTPATIENT)
Dept: INTERNAL MEDICINE CLINIC | Age: 51
End: 2021-03-24

## 2021-03-24 ENCOUNTER — HOSPITAL ENCOUNTER (OUTPATIENT)
Dept: LAB | Age: 51
Discharge: HOME | End: 2021-03-24
Payer: COMMERCIAL

## 2021-03-24 DIAGNOSIS — R00.2 PALPITATIONS: ICD-10-CM

## 2021-03-24 DIAGNOSIS — M35.00 SJOGREN'S SYNDROME, WITH UNSPECIFIED ORGAN INVOLVEMENT (HCC): ICD-10-CM

## 2021-03-24 DIAGNOSIS — I10 ESSENTIAL HYPERTENSION: ICD-10-CM

## 2021-03-24 DIAGNOSIS — L29.9 PRURITUS: ICD-10-CM

## 2021-03-24 LAB
ALBUMIN SERPL-MCNC: 3.2 G/DL (ref 3.4–5)
ALBUMIN/GLOB SERPL: 0.7 {RATIO} (ref 0.8–1.7)
ALP SERPL-CCNC: 88 U/L (ref 45–117)
ALT SERPL-CCNC: 21 U/L (ref 13–56)
ANION GAP SERPL CALC-SCNC: 6 MMOL/L (ref 3–18)
AST SERPL-CCNC: 12 U/L (ref 10–38)
BASOPHILS # BLD: 0 K/UL (ref 0–0.1)
BASOPHILS NFR BLD: 0 % (ref 0–2)
BILIRUB SERPL-MCNC: 0.3 MG/DL (ref 0.2–1)
BUN SERPL-MCNC: 8 MG/DL (ref 7–18)
BUN/CREAT SERPL: 11 (ref 12–20)
CALCIUM SERPL-MCNC: 8.8 MG/DL (ref 8.5–10.1)
CHLORIDE SERPL-SCNC: 105 MMOL/L (ref 100–111)
CHOLEST SERPL-MCNC: 179 MG/DL
CO2 SERPL-SCNC: 30 MMOL/L (ref 21–32)
CREAT SERPL-MCNC: 0.73 MG/DL (ref 0.6–1.3)
CREAT UR-MCNC: 306 MG/DL (ref 30–125)
CRP SERPL-MCNC: 3.4 MG/DL (ref 0–0.3)
DIFFERENTIAL METHOD BLD: ABNORMAL
EOSINOPHIL # BLD: 0.2 K/UL (ref 0–0.4)
EOSINOPHIL NFR BLD: 2 % (ref 0–5)
ERYTHROCYTE [DISTWIDTH] IN BLOOD BY AUTOMATED COUNT: 17.4 % (ref 11.6–14.5)
GLOBULIN SER CALC-MCNC: 4.3 G/DL (ref 2–4)
GLUCOSE SERPL-MCNC: 84 MG/DL (ref 74–99)
HBA1C MFR BLD: 5.4 % (ref 4.2–5.6)
HCT VFR BLD AUTO: 36.5 % (ref 35–45)
HDLC SERPL-MCNC: 75 MG/DL (ref 40–60)
HDLC SERPL: 2.4 {RATIO} (ref 0–5)
HGB BLD-MCNC: 11.8 G/DL (ref 12–16)
LDLC SERPL CALC-MCNC: 95.6 MG/DL (ref 0–100)
LIPID PROFILE,FLP: ABNORMAL
LYMPHOCYTES # BLD: 1.7 K/UL (ref 0.9–3.6)
LYMPHOCYTES NFR BLD: 20 % (ref 21–52)
MCH RBC QN AUTO: 25.2 PG (ref 24–34)
MCHC RBC AUTO-ENTMCNC: 32.3 G/DL (ref 31–37)
MCV RBC AUTO: 77.8 FL (ref 74–97)
MICROALBUMIN UR-MCNC: 1.64 MG/DL (ref 0–3)
MICROALBUMIN/CREAT UR-RTO: 5 MG/G (ref 0–30)
MONOCYTES # BLD: 0.6 K/UL (ref 0.05–1.2)
MONOCYTES NFR BLD: 7 % (ref 3–10)
NEUTS SEG # BLD: 5.8 K/UL (ref 1.8–8)
NEUTS SEG NFR BLD: 71 % (ref 40–73)
PLATELET # BLD AUTO: 494 K/UL (ref 135–420)
PMV BLD AUTO: 9.4 FL (ref 9.2–11.8)
POTASSIUM SERPL-SCNC: 4.1 MMOL/L (ref 3.5–5.5)
PROT SERPL-MCNC: 7.5 G/DL (ref 6.4–8.2)
RBC # BLD AUTO: 4.69 M/UL (ref 4.2–5.3)
SODIUM SERPL-SCNC: 141 MMOL/L (ref 136–145)
T4 FREE SERPL-MCNC: 0.9 NG/DL (ref 0.7–1.5)
TRIGL SERPL-MCNC: 42 MG/DL (ref ?–150)
TSH SERPL DL<=0.05 MIU/L-ACNC: 0.79 UIU/ML (ref 0.36–3.74)
VLDLC SERPL CALC-MCNC: 8.4 MG/DL
WBC # BLD AUTO: 8.2 K/UL (ref 4.6–13.2)

## 2021-03-24 PROCEDURE — 86140 C-REACTIVE PROTEIN: CPT

## 2021-03-24 PROCEDURE — 83036 HEMOGLOBIN GLYCOSYLATED A1C: CPT

## 2021-03-24 PROCEDURE — 36415 COLL VENOUS BLD VENIPUNCTURE: CPT

## 2021-03-24 PROCEDURE — 80061 LIPID PANEL: CPT

## 2021-03-24 PROCEDURE — 82043 UR ALBUMIN QUANTITATIVE: CPT

## 2021-03-24 PROCEDURE — 80053 COMPREHEN METABOLIC PANEL: CPT

## 2021-03-24 PROCEDURE — 84439 ASSAY OF FREE THYROXINE: CPT

## 2021-03-24 PROCEDURE — 85025 COMPLETE CBC W/AUTO DIFF WBC: CPT

## 2021-03-26 NOTE — PROGRESS NOTES
Amiclare Conwaykimberly presents today for   Chief Complaint   Patient presents with    Follow-up    Hypertension    Labs     3-24-21    Back Pain     lower back pain x 1 month ago.  Side Pain     left side pain intermittent x 2 weeks.  Aphagia     with acid reflux, which causes abdominal pain after she eats.  Hirsutism     Patient reports increased facial hair. Coordination of Care:  1. Have you been to the ER, urgent care clinic since your last visit? Hospitalized since your last visit? no    2. Have you seen or consulted any other health care providers outside of the [address] [place] [address] since your last visit? Include any pap smears or colon screening.  no

## 2021-03-26 NOTE — PROGRESS NOTES
I will let her know her upcoming appointment that there is no microalbuminuria. Her CRP is up to 3.4 from 1.3 last year. Her CMP shows a mildly low albumin of 3.2 and elevated globulin level of 4.3. Her total cholesterol is 179. Triglycerides are 42. Her HDL is 75. Her LDL is 95. TFTs are normal.  Her A1c is normal.  Her CBC shows a mildly low hemoglobin of 11.8. Normal is 12-16. Her RDW is elevated at 17.4. Her MCV is normal at 77. Her platelet count is 809 and elevated. It was 467 when checked last year.     Dr. Patrice Mehta  [facility], [address] [place] [address], [facility]  Phone: [phone]  Fax: [phone]

## 2021-03-31 ENCOUNTER — OFFICE VISIT (OUTPATIENT)
Dept: INTERNAL MEDICINE CLINIC | Age: 51
End: 2021-03-31
Payer: COMMERCIAL

## 2021-03-31 ENCOUNTER — HOSPITAL ENCOUNTER (OUTPATIENT)
Dept: LAB | Age: 51
Discharge: HOME | End: 2021-03-31
Payer: COMMERCIAL

## 2021-03-31 VITALS
TEMPERATURE: 97.6 F | RESPIRATION RATE: 18 BRPM | DIASTOLIC BLOOD PRESSURE: 69 MMHG | WEIGHT: 260 LBS | HEIGHT: 66 IN | OXYGEN SATURATION: 96 % | BODY MASS INDEX: 41.78 KG/M2 | HEART RATE: 64 BPM | SYSTOLIC BLOOD PRESSURE: 126 MMHG

## 2021-03-31 DIAGNOSIS — R10.9 LEFT LATERAL ABDOMINAL PAIN: ICD-10-CM

## 2021-03-31 DIAGNOSIS — R10.9 LEFT LATERAL ABDOMINAL PAIN: Primary | ICD-10-CM

## 2021-03-31 DIAGNOSIS — D64.9 ANEMIA, UNSPECIFIED TYPE: ICD-10-CM

## 2021-03-31 DIAGNOSIS — D75.839 THROMBOCYTOSIS: ICD-10-CM

## 2021-03-31 DIAGNOSIS — M35.00 SJOGREN'S SYNDROME, WITH UNSPECIFIED ORGAN INVOLVEMENT (HCC): ICD-10-CM

## 2021-03-31 DIAGNOSIS — R77.1 ELEVATED SERUM GLOBULIN LEVEL: ICD-10-CM

## 2021-03-31 DIAGNOSIS — R79.82 ELEVATED C-REACTIVE PROTEIN (CRP): ICD-10-CM

## 2021-03-31 DIAGNOSIS — R13.10 DYSPHAGIA, UNSPECIFIED TYPE: ICD-10-CM

## 2021-03-31 DIAGNOSIS — M54.50 ACUTE LOW BACK PAIN, UNSPECIFIED BACK PAIN LATERALITY, UNSPECIFIED WHETHER SCIATICA PRESENT: ICD-10-CM

## 2021-03-31 LAB
BASOPHILS # BLD: 0 K/UL (ref 0–0.1)
BASOPHILS NFR BLD: 0 % (ref 0–2)
CRP SERPL-MCNC: 1.8 MG/DL (ref 0–0.3)
DIFFERENTIAL METHOD BLD: ABNORMAL
EOSINOPHIL # BLD: 0.2 K/UL (ref 0–0.4)
EOSINOPHIL NFR BLD: 3 % (ref 0–5)
ERYTHROCYTE [DISTWIDTH] IN BLOOD BY AUTOMATED COUNT: 16.7 % (ref 11.6–14.5)
FERRITIN SERPL-MCNC: 96 NG/ML (ref 8–388)
HCT VFR BLD AUTO: 37.9 % (ref 35–45)
HGB BLD-MCNC: 12 G/DL (ref 12–16)
IRON SATN MFR SERPL: 22 % (ref 20–50)
IRON SERPL-MCNC: 67 UG/DL (ref 50–175)
LYMPHOCYTES # BLD: 2 K/UL (ref 0.9–3.6)
LYMPHOCYTES NFR BLD: 27 % (ref 21–52)
MCH RBC QN AUTO: 24.5 PG (ref 24–34)
MCHC RBC AUTO-ENTMCNC: 31.7 G/DL (ref 31–37)
MCV RBC AUTO: 77.3 FL (ref 74–97)
MONOCYTES # BLD: 0.5 K/UL (ref 0.05–1.2)
MONOCYTES NFR BLD: 7 % (ref 3–10)
NEUTS SEG # BLD: 4.6 K/UL (ref 1.8–8)
NEUTS SEG NFR BLD: 63 % (ref 40–73)
PLATELET # BLD AUTO: 512 K/UL (ref 135–420)
PMV BLD AUTO: 9.3 FL (ref 9.2–11.8)
RBC # BLD AUTO: 4.9 M/UL (ref 4.2–5.3)
TIBC SERPL-MCNC: 307 UG/DL (ref 250–450)
WBC # BLD AUTO: 7.3 K/UL (ref 4.6–13.2)

## 2021-03-31 PROCEDURE — 86235 NUCLEAR ANTIGEN ANTIBODY: CPT

## 2021-03-31 PROCEDURE — 36415 COLL VENOUS BLD VENIPUNCTURE: CPT

## 2021-03-31 PROCEDURE — 84155 ASSAY OF PROTEIN SERUM: CPT

## 2021-03-31 PROCEDURE — 82728 ASSAY OF FERRITIN: CPT

## 2021-03-31 PROCEDURE — 83540 ASSAY OF IRON: CPT

## 2021-03-31 PROCEDURE — 86140 C-REACTIVE PROTEIN: CPT

## 2021-03-31 PROCEDURE — 86200 CCP ANTIBODY: CPT

## 2021-03-31 PROCEDURE — 81270 JAK2 GENE: CPT

## 2021-03-31 PROCEDURE — 99214 OFFICE O/P EST MOD 30 MIN: CPT | Performed by: INTERNAL MEDICINE

## 2021-03-31 PROCEDURE — 85025 COMPLETE CBC W/AUTO DIFF WBC: CPT

## 2021-03-31 RX ORDER — OMEPRAZOLE 20 MG/1
20 CAPSULE, DELAYED RELEASE ORAL DAILY
Qty: 30 CAP | Refills: 1 | Status: SHIPPED | OUTPATIENT
Start: 2021-03-31 | End: 2021-05-03

## 2021-03-31 NOTE — PROGRESS NOTES
[facility]:  3/31/2021, MRN: [id number]      Thony Sorenson is a 46 y.o. female and presents to clinic for Follow-up, Hypertension, Labs (3-24-21), Back Pain (lower back pain x 1 month ago. ), Side Pain (left side pain intermittent x 2 weeks. ), Aphagia (with acid reflux, which causes abdominal pain after she eats. ), and Hirsutism (Patient reports increased facial hair. )    Subjective: The pt is a 47yo female with obesity, anemia, HTN, depression, positive Sjogren's serology, and sarcoidosis (diagnosed at 24yo, followed by [facility]). 1. Back Pain: Along her lower back x 1 month. Worse with standing up. +Off/on. No alleviating factors are known. Lying down on her side and flexing her knees help to relieve her sx. No weakness/paresthesias. 2. LLQ Abdominal Pain: Off/on. Pain is \"internal\" x 2 wks. +Dysphagia x 1 month. Sx are off/on and with solids/liquids. \"Sometimes I regurgitate a sour liquid. \"  Pain is mostly along her left lower quadrant. 3. Sjogren's Disease: Last eye exam: >1 yr. +Dry eyes/mouth. Her most recent labs show that her CRP is elevated. Results for Heydi Guallpa (MRN [id number]) as of 3/31/2021 09:48   Ref. Range 6/11/2020 08:49 9/12/2020 08:03 3/24/2021 09:22   C-Reactive protein Latest Ref Range: 0 - 0.3 mg/dL 1.3 (H)  3.4 (H)     4.  Elevated Globulin and Abnormal CBC: Her most recent labs show that her globulin level is elevated. Meanwhile, her CBC shows a mildly low hemoglobin of 11.8. Her RDW is elevated at 17.4 and her MCV is normal at 77. Her platelet count is 103 and elevated. It was 467 when checked last year. Patient Active Problem List    Diagnosis Date Noted    [facility] ([facility] 06/04/2020    Severe obesity (BMI 35.0-39. 9) with comorbidity ([facility] [address] 04/12/2018    Essential hypertension 06/07/2016    Sarcoidosis 06/07/2016       Current Outpatient Medications   Medication Sig Dispense Refill    amLODIPine (NORVASC) 2.5 mg tablet TAKE 1 TABLET BY MOUTH EVERY DAY (Patient taking differently: Take 2.5 mg by mouth two (2) times a day.) 90 Tab 1    losartan (COZAAR) 25 mg tablet TAKE 1 TABLET BY MOUTH EVERY DAY 90 Tab 1    cholecalciferol, vitamin D3, (VITAMIN D3) 2,000 unit tab Take 2,000 Units by mouth daily.  iron-vitamin C 65 mg iron- 125 mg TbEC Take  mg by mouth daily.  biotin 2,500 mcg Tab Take 2,500 mcg by mouth daily.  MULTIVITAMIN WITH MINERALS (ONE DAILY COMPLETE PO) Take  by mouth daily.  ibuprofen (MOTRIN) 800 mg tablet TAKE 1 TABLET BY MOUTH EVERY 8 HOURS FOR 3 DAYS, THEN AS NEEDED FOR PAIN. TAKE WITH FOOD  0       Allergies   Allergen Reactions    Hydrochlorothiazide Other (comments)     Hair loss    Oxycodone Itching and Other (comments)    Percocet [Oxycodone-Acetaminophen] Itching       Past Medical History:   Diagnosis Date    Anemia     Asthma     childhood asthma    Hypertension     Menopause     Menorrhalgia 2015    Sarcoidosis     no problems managed by PCP    Sjogren's syndrome ([facility] [address] 2020       Past Surgical History:   Procedure Laterality Date    HX ABDOMINOPLASTY      HX  SECTION      x 4    HX HERNIA REPAIR      umbilical    HX LIPOMA RESECTION      lower back    HX TUBAL LIGATION      HX WISDOM TEETH EXTRACTION      x2    MT MYOMECTOMY 1-4,W/TOT 250GMS/<,ABD APPRCH         Family History   Problem Relation Age of Onset    Hypertension Mother     Diabetes Father     No Known Problems Maternal Grandmother     Alcohol abuse Maternal Grandfather     Cancer Paternal Grandmother         breast    Alcohol abuse Paternal Grandfather     No Known Problems Daughter     No Known Problems Son        Social History     Tobacco Use    Smoking status: Current Some Day Smoker     Packs/day: 0.25     Years: 30.00     Pack years: 7.50     Types: Cigarettes     Start date: 2018    Smokeless tobacco: Never Used   Substance Use Topics    Alcohol use:  Yes Alcohol/week: 0.0 - 7.0 standard drinks     Comment: patient will drink either wine or liquour within the week        ROS   Review of Systems   Constitutional: Negative for chills and fever. HENT: Negative for ear pain and sore throat. Eyes: Negative for blurred vision and pain. Respiratory: Negative for cough and shortness of breath. Cardiovascular: Negative for chest pain. Gastrointestinal: Positive for abdominal pain. Negative for blood in stool and melena. Genitourinary: Negative for dysuria and hematuria. Musculoskeletal: Positive for back pain. Negative for joint pain and myalgias. Skin: Negative for rash. Neurological: Negative for tingling, focal weakness and headaches. Endo/Heme/Allergies: Does not bruise/bleed easily. Psychiatric/Behavioral: Negative for substance abuse. Objective     Vitals:    03/31/21 0915   BP: 126/69   Pulse: 64   Resp: 18   Temp: 97.6 °F (36.4 °C)   TempSrc: Temporal   SpO2: 96%   Weight: 260 lb (117.9 kg)   Height: 5' 6\" (1.676 m)   PainSc:   3   PainLoc: Back       Physical Exam  Vitals signs and nursing note reviewed. HENT:      Head: Normocephalic and atraumatic. Right Ear: External ear normal.      Left Ear: External ear normal.      Nose: Nose normal.      Mouth/Throat:      Pharynx: No oropharyngeal exudate. Eyes:      General: No scleral icterus. Right eye: No discharge. Left eye: No discharge. Conjunctiva/sclera: Conjunctivae normal.   Neck:      Musculoskeletal: Neck supple. Cardiovascular:      Rate and Rhythm: Normal rate and regular rhythm. Heart sounds: Normal heart sounds. No murmur. No friction rub. No gallop. Pulmonary:      Effort: Pulmonary effort is normal. No respiratory distress. Breath sounds: Normal breath sounds. No wheezing or rales. Abdominal:      General: Bowel sounds are normal. There is no distension. Palpations: Abdomen is soft. There is no mass. Tenderness:  There is no abdominal tenderness. There is no guarding or rebound. Musculoskeletal:         General: No swelling (BUE) or tenderness (BUE). Comments: She has TTP along her lumbar spinous processes; paraspinal muscles are NTTP   Lymphadenopathy:      Cervical: No cervical adenopathy. Skin:     General: Skin is warm and dry. Findings: No erythema. Neurological:      Mental Status: She is alert and oriented to person, place, and time. Motor: No abnormal muscle tone. Gait: Gait is intact. Gait normal.   Psychiatric:         Mood and Affect: Mood and affect normal.         LABS   Data Review:   Lab Results   Component Value Date/Time    WBC 8.2 03/24/2021 09:22 AM    HGB 11.8 (L) 03/24/2021 09:22 AM    HCT 36.5 03/24/2021 09:22 AM    PLATELET 951 (H) 29/81/2400 09:22 AM    MCV 77.8 03/24/2021 09:22 AM       Lab Results   Component Value Date/Time    Sodium 141 03/24/2021 09:22 AM    Potassium 4.1 03/24/2021 09:22 AM    Chloride 105 03/24/2021 09:22 AM    CO2 30 03/24/2021 09:22 AM    Anion gap 6 03/24/2021 09:22 AM    Glucose 84 03/24/2021 09:22 AM    BUN 8 03/24/2021 09:22 AM    Creatinine 0.73 03/24/2021 09:22 AM    BUN/Creatinine ratio 11 (L) 03/24/2021 09:22 AM    GFR est AA >60 03/24/2021 09:22 AM    GFR est non-AA >60 03/24/2021 09:22 AM    Calcium 8.8 03/24/2021 09:22 AM       Lab Results   Component Value Date/Time    Cholesterol, total 179 03/24/2021 09:22 AM    HDL Cholesterol 75 (H) 03/24/2021 09:22 AM    LDL cholesterol 82 06/24/2011 10:16 AM    LDL, calculated 95.6 03/24/2021 09:22 AM    VLDL, calculated 8.4 03/24/2021 09:22 AM    Triglyceride 42 03/24/2021 09:22 AM    CHOL/HDL Ratio 2.4 03/24/2021 09:22 AM       Lab Results   Component Value Date/Time    Hemoglobin A1c 5.4 03/24/2021 09:22 AM       Assessment/Plan:   1. Dysphagia:   PPI recommended. Ordering abdominal ultrasound. Referral to GI. Ordering a barium swallow study.     ORDERS:  - XR BA SWALLOW ESOPHOGRAM; Future  - omeprazole (PRILOSEC) 20 mg capsule; Take 1 Cap by mouth daily. Dispense: 30 Cap; Refill: 1  - REFERRAL TO GASTROENTEROLOGY  - US ABD COMP; Future    2. Left lateral abdominal pain: Etiology unknown  Ordering abdominal ultrasound, labs, and occult blood stool test.  Referral placed to GI. ORDERS:  - omeprazole (PRILOSEC) 20 mg capsule; Take 1 Cap by mouth daily. Dispense: 30 Cap; Refill: 1  - REFERRAL TO GASTROENTEROLOGY  - US ABD COMP; Future  - C REACTIVE PROTEIN, QT; Future  - CBC WITH AUTOMATED DIFF; Future  - OCCULT BLOOD IMMUNOASSAY,DIAGNOSTIC; Future    3. Sjogren's syndrome: Her most recent CRP is mildly elevated. Referral placed for formal eye exam.  [address] labs. ORDERS:  - REFERRAL TO OPHTHALMOLOGY  - C REACTIVE PROTEIN, QT; Future  - CBC WITH AUTOMATED DIFF; Future  - DIAZ COMPREHENSIVE PANEL; Future  - RHEUMASSURE; Future    4. Acute low back pain: Likely from sciatica per hx. Ordering a lumbar spine x-ray series. Activity as tolerated. ORDERS:  - XR SPINE LUMB COMP W BEND; Future    5. Elevated serum globulin level:  +Anemia. +Thrombocytosis. [address] labs. ORDERS:  - PROTEIN ELECTROPHORESIS; Future  - C REACTIVE PROTEIN, QT; Future  - JAK2 MUTATION ANALYSIS; Future  - CBC WITH AUTOMATED DIFF; Future  - FERRITIN; Future  - OCCULT BLOOD IMMUNOASSAY,DIAGNOSTIC; Future        Health Maintenance Due   Topic Date Due    Shingrix Vaccine Age 49> (1 of 2) Never done         Lab review: labs are reviewed in the EHR and ordered as mentioned above    I have discussed the diagnosis with the patient and the intended plan as seen in the above orders. The patient has received an after-visit summary and questions were answered concerning future plans. I have discussed medication side effects and warnings with the patient as well. I have reviewed the plan of care with the patient, accepted their input and they are in agreement with the treatment goals. All questions were answered.  The patient understands the plan of care. Handouts provided today with above information. Pt instructed if symptoms worsen to call the office or report to the ED for continued care. Greater than 50% of the visit time was spent in counseling and/or coordination of care. Voice recognition was used to generate this report, which may have resulted in some phonetic based errors in grammar and contents. Even though attempts were made to correct all the mistakes, some may have been missed, and remained in the body of the document.           Cintia Lewis MD

## 2021-03-31 NOTE — PATIENT INSTRUCTIONS
Abdominal Pain: Care Instructions Your Care Instructions Abdominal pain has many possible causes. Some aren't serious and get better on their own in a few days. Others need more testing and treatment. If your pain continues or gets worse, you need to be rechecked and may need more tests to find out what is wrong. You may need surgery to correct the problem. Don't ignore new symptoms, such as fever, nausea and vomiting, urination problems, pain that gets worse, and dizziness. These may be signs of a more serious problem. Your doctor may have recommended a follow-up visit in the next 8 to 12 hours. If you are not getting better, you may need more tests or treatment. The doctor has checked you carefully, but problems can develop later. If you notice any problems or new symptoms, get medical treatment right away. Follow-up care is a key part of your treatment and safety. Be sure to make and go to all appointments, and call your doctor if you are having problems. It's also a good idea to know your test results and keep a list of the medicines you take. How can you care for yourself at home? · Rest until you feel better. · To prevent dehydration, drink plenty of fluids, enough so that your urine is light yellow or clear like water. Choose water and other caffeine-free clear liquids until you feel better. If you have kidney, heart, or liver disease and have to limit fluids, talk with your doctor before you increase the amount of fluids you drink. · If your stomach is upset, eat mild foods, such as rice, dry toast or crackers, bananas, and applesauce. Try eating several small meals instead of two or three large ones. · Wait until 48 hours after all symptoms have gone away before you have spicy foods, alcohol, and drinks that contain caffeine. · Do not eat foods that are high in fat. · Avoid anti-inflammatory medicines such as aspirin, ibuprofen (Advil, Motrin), and naproxen (Aleve).  These can cause stomach upset. Talk to your doctor if you take daily aspirin for another health problem. When should you call for help? Call 911 anytime you think you may need emergency care. For example, call if: 
  · You passed out (lost consciousness).  
  · You pass maroon or very bloody stools.  
  · You vomit blood or what looks like coffee grounds.  
  · You have new, severe belly pain. Call your doctor now or seek immediate medical care if: 
  · Your pain gets worse, especially if it becomes focused in one area of your belly.  
  · You have a new or higher fever.  
  · Your stools are black and look like tar, or they have streaks of blood.  
  · You have unexpected vaginal bleeding.  
  · You have symptoms of a urinary tract infection. These may include: 
? Pain when you urinate. ? Urinating more often than usual. 
? Blood in your urine.  
  · You are dizzy or lightheaded, or you feel like you may faint. Watch closely for changes in your health, and be sure to contact your doctor if: 
  · You are not getting better after 1 day (24 hours). Where can you learn more? Go to http://www.gray.com/ Enter D082 in the search box to learn more about \"Abdominal Pain: Care Instructions. \" Current as of: June 26, 2019               Content Version: 12.6 © 1047-5899 [employer]. Care instructions adapted under license by [employer] (which disclaims liability or warranty for this information). If you have questions about a medical condition or this instruction, always ask your healthcare professional. [place] [address] any warranty or liability for your use of this information.

## 2021-04-01 LAB
ALBUMIN SERPL ELPH-MCNC: 3.6 G/DL (ref 2.9–4.4)
ALBUMIN/GLOB SERPL: 0.9 {RATIO} (ref 0.7–1.7)
ALPHA1 GLOB SERPL ELPH-MCNC: 0.2 G/DL (ref 0–0.4)
ALPHA2 GLOB SERPL ELPH-MCNC: 1 G/DL (ref 0.4–1)
B-GLOBULIN SERPL ELPH-MCNC: 1.2 G/DL (ref 0.7–1.3)
CENTROMERE B AB SER-ACNC: <0.2 AI (ref 0–0.9)
CHROMATIN AB SERPL-ACNC: <0.2 AI (ref 0–0.9)
DSDNA AB SER-ACNC: <1 IU/ML (ref 0–9)
ENA JO1 AB SER-ACNC: <0.2 AI (ref 0–0.9)
ENA RNP AB SER-ACNC: <0.2 AI (ref 0–0.9)
ENA SCL70 AB SER-ACNC: <0.2 AI (ref 0–0.9)
ENA SM AB SER-ACNC: <0.2 AI (ref 0–0.9)
ENA SS-A AB SER-ACNC: 7.5 AI (ref 0–0.9)
ENA SS-B AB SER-ACNC: <0.2 AI (ref 0–0.9)
GAMMA GLOB SERPL ELPH-MCNC: 1.7 G/DL (ref 0.4–1.8)
GLOBULIN SER CALC-MCNC: 4.1 G/DL (ref 2.2–3.9)
M PROTEIN SERPL ELPH-MCNC: ABNORMAL G/DL
PROT SERPL-MCNC: 7.7 G/DL (ref 6–8.5)
SEE BELOW, 164869: ABNORMAL

## 2021-04-05 LAB
BACKGROUND: 489207: NORMAL
DIRECTOR REVIEW: 489204: NORMAL
JAK2 P.V617F BLD/T QL: NORMAL

## 2021-04-12 LAB
14.3.3 ETA, RHEUM. ARTHRITIS: <0.2 NG/ML
CCP IGA+IGG SERPL IA-ACNC: <20 UNITS
RHEUMATOID FACT SERPL-ACNC: <14 UNITS/ML

## 2021-04-12 NOTE — PROGRESS NOTES
Please let her know that her rheumatoid factor lab is normal/negative.     Dr. Greer Cyr  [facility] of [facility], [address] [facility], [address] [place] [address] Str.  Phone: [phone]  Fax: [phone]

## 2021-04-13 ENCOUNTER — TELEPHONE (OUTPATIENT)
Dept: INTERNAL MEDICINE CLINIC | Age: 51
End: 2021-04-13

## 2021-04-13 NOTE — TELEPHONE ENCOUNTER
----- Message from Madonna Jones MD sent at 4/12/2021  3:21 PM EDT -----  Please let her know that her rheumatoid factor lab is normal/negative.     Dr. Joselin Andrade  Internists of [address], [address] [place], [place] [address] [address]  Phone: [phone]  Fax: [phone]

## 2021-04-13 NOTE — TELEPHONE ENCOUNTER
Please let her know that her platelet count has been elevated prior to being vaccinated. Although there has been some concern with platelet counts dropping after a patient gets a Covid vaccine, so far, the CDC and FDA do not believe that the Covid vaccines cause of low platelets. As a result, I do not have any concern that her platelet count will drop because of the vaccine. Given her history of an elevated platelet count, she should continue to have her platelet counts monitored, as discussed at her previous appointment.     Dr. Stephanie Boyle  Internists of [address]  Phone: [phone]  Fax: [phone]

## 2021-04-13 NOTE — TELEPHONE ENCOUNTER
Pt calling, says she is wondering if she needs to be concerned about her platelet  count being related to her covid shot? Says she has heard on the news the two can be related. She says she is scheduled for additional testing on Thursday.

## 2021-04-15 ENCOUNTER — HOSPITAL ENCOUNTER (OUTPATIENT)
Dept: ULTRASOUND IMAGING | Age: 51
Discharge: HOME | End: 2021-04-15
Attending: INTERNAL MEDICINE
Payer: COMMERCIAL

## 2021-04-15 ENCOUNTER — HOSPITAL ENCOUNTER (OUTPATIENT)
Dept: GENERAL RADIOLOGY | Age: 51
Discharge: HOME | End: 2021-04-15
Attending: INTERNAL MEDICINE
Payer: COMMERCIAL

## 2021-04-15 DIAGNOSIS — M54.50 ACUTE LOW BACK PAIN, UNSPECIFIED BACK PAIN LATERALITY, UNSPECIFIED WHETHER SCIATICA PRESENT: ICD-10-CM

## 2021-04-15 PROCEDURE — 76700 US EXAM ABDOM COMPLETE: CPT

## 2021-04-15 PROCEDURE — 74220 X-RAY XM ESOPHAGUS 1CNTRST: CPT

## 2021-04-15 PROCEDURE — 72114 X-RAY EXAM L-S SPINE BENDING: CPT

## 2021-04-15 PROCEDURE — 74011000250 HC RX REV CODE- 250: Performed by: INTERNAL MEDICINE

## 2021-04-15 RX ADMIN — ANTACID/ANTIFLATULENT 4 G: 380; 550; 10; 10 GRANULE, EFFERVESCENT ORAL at 10:00

## 2021-04-15 RX ADMIN — BARIUM SULFATE 176 G: 960 POWDER, FOR SUSPENSION ORAL at 10:00

## 2021-04-15 RX ADMIN — BARIUM SULFATE 700 MG: 700 TABLET ORAL at 10:00

## 2021-04-15 RX ADMIN — BARIUM SULFATE 135 ML: 980 POWDER, FOR SUSPENSION ORAL at 10:00

## 2021-04-21 NOTE — PROGRESS NOTES
Please let her know that her barium swallow study shows mild dysmotility. Meanwhile her abdominal ultrasound shows a mildly enlarged liver.

## 2021-04-22 ENCOUNTER — TELEPHONE (OUTPATIENT)
Dept: INTERNAL MEDICINE CLINIC | Age: 51
End: 2021-04-22

## 2021-04-22 DIAGNOSIS — M47.819 FACET ARTHROPATHY: Primary | ICD-10-CM

## 2021-04-22 NOTE — TELEPHONE ENCOUNTER
----- Message from Delta Anton MD sent at 4/22/2021  3:17 PM EDT -----  Please let her know that her lumbar xray series shows evidence consistent with pinched nerves - facet arthropathy (left greater than right). If her back pain is persistent, please have her get in to see Orthopedics.      Dr. Ismael Mayer  [facility] of [facility], [address] [place] [address], [address] [place], [place] [address] [address]  Phone: [phone]  Fax: [phone]

## 2021-04-22 NOTE — PROGRESS NOTES
Please let her know that her lumbar xray series shows evidence consistent with pinched nerves - facet arthropathy (left greater than right). If her back pain is persistent, please have her get in to see Orthopedics.      Dr. Laddie Nyhan  [facility] of [facility], [address] [place] [address], [address] [place] [address] Str.  Phone: [phone]  Fax: [phone]

## 2021-04-27 ENCOUNTER — HOSPITAL ENCOUNTER (OUTPATIENT)
Dept: LAB | Age: 51
Discharge: HOME | End: 2021-04-27
Payer: COMMERCIAL

## 2021-04-27 DIAGNOSIS — D64.9 ANEMIA, UNSPECIFIED TYPE: ICD-10-CM

## 2021-04-27 DIAGNOSIS — R10.9 LEFT LATERAL ABDOMINAL PAIN: ICD-10-CM

## 2021-04-27 PROCEDURE — 82274 ASSAY TEST FOR BLOOD FECAL: CPT

## 2021-05-01 DIAGNOSIS — I10 ESSENTIAL HYPERTENSION: ICD-10-CM

## 2021-05-01 LAB — HEMOCCULT STL QL IA: NEGATIVE

## 2021-05-03 ENCOUNTER — TELEPHONE (OUTPATIENT)
Dept: INTERNAL MEDICINE CLINIC | Age: 51
End: 2021-05-03

## 2021-05-03 DIAGNOSIS — I10 ESSENTIAL HYPERTENSION: ICD-10-CM

## 2021-05-03 RX ORDER — AMLODIPINE BESYLATE 2.5 MG/1
TABLET ORAL
Qty: 90 TAB | Refills: 1 | Status: SHIPPED | OUTPATIENT
Start: 2021-05-03 | End: 2021-05-07

## 2021-05-03 RX ORDER — AMLODIPINE BESYLATE 2.5 MG/1
TABLET ORAL
Qty: 90 TAB | Refills: 1 | Status: SHIPPED | OUTPATIENT
Start: 2021-05-03 | End: 2021-05-03 | Stop reason: SDUPTHER

## 2021-05-03 RX ORDER — AMLODIPINE BESYLATE 5 MG/1
TABLET ORAL
Qty: 90 TAB | Refills: 1 | OUTPATIENT
Start: 2021-05-03

## 2021-05-03 NOTE — TELEPHONE ENCOUNTER
Gonzalez Gooden; Self, Clara 8 minutes ago (2:34 PM)     Pt said DR Mili An was going to increase her Amlodipine to 5 mg tablets

## 2021-05-07 RX ORDER — AMLODIPINE BESYLATE 5 MG/1
5 TABLET ORAL DAILY
Qty: 90 TAB | Refills: 2 | Status: SHIPPED | OUTPATIENT
Start: 2021-05-07 | End: 2022-10-17 | Stop reason: CLARIF

## 2021-05-07 NOTE — PROGRESS NOTES
Please let her know that her occult blood stool test is negative.     Dr. Tierney Wagenr  [facility] of [facility], [address] [facility], [address] [place] [address] Str.  Phone: [phone]  Fax: [phone]

## 2021-05-07 NOTE — TELEPHONE ENCOUNTER
Patient states she needs Amlodipine 5 mg called into the pharmacy. Patient states she gets headaches when she is on 2.5 mg and would like medication changed back. Patient also states Dr. Sylvia Mead told her to let her know if she needed Amlodipine 5 mg resumed.

## 2021-05-07 NOTE — TELEPHONE ENCOUNTER
Pt calling again checing on her Rx for Amlodipine , she said Dr Alan Ronquillo was going to increase her dogage to 5 mg tablets .  The pharmacy has it at 2.5 mg tablets could you please resend new dosage to the pharmacy

## 2021-08-01 DIAGNOSIS — I10 ESSENTIAL HYPERTENSION: ICD-10-CM

## 2021-08-01 RX ORDER — LOSARTAN POTASSIUM 25 MG/1
TABLET ORAL
Qty: 90 TABLET | Refills: 1 | Status: SHIPPED | OUTPATIENT
Start: 2021-08-01

## 2021-08-01 NOTE — TELEPHONE ENCOUNTER
Please schedule her for a 30 min in office apt with me in September.     Dr. Stacy Boards  [facility] of [facility], [address] [facility], [address] [facility] [address]  Phone: [phone]  Fax: [phone]

## 2021-09-10 NOTE — PROGRESS NOTES
Taylor Dee presents today for   Chief Complaint   Patient presents with    Follow-up    Fatigue     Patient reports feeling tired for a few months. Coordination of Care:  1. Have you been to the ER, urgent care clinic since your last visit? Hospitalized since your last visit? no    2. Have you seen or consulted any other health care providers outside of the [address] [place] [address] since your last visit? Include any pap smears or colon screening.  yes

## 2021-09-13 ENCOUNTER — OFFICE VISIT (OUTPATIENT)
Dept: INTERNAL MEDICINE CLINIC | Age: 51
End: 2021-09-13
Payer: COMMERCIAL

## 2021-09-13 VITALS
BODY MASS INDEX: 41.78 KG/M2 | TEMPERATURE: 97.3 F | SYSTOLIC BLOOD PRESSURE: 125 MMHG | RESPIRATION RATE: 18 BRPM | DIASTOLIC BLOOD PRESSURE: 86 MMHG | OXYGEN SATURATION: 96 % | HEART RATE: 70 BPM | HEIGHT: 66 IN | WEIGHT: 260 LBS

## 2021-09-13 DIAGNOSIS — D86.9 SARCOIDOSIS: ICD-10-CM

## 2021-09-13 DIAGNOSIS — R73.9 HYPERGLYCEMIA: ICD-10-CM

## 2021-09-13 DIAGNOSIS — R53.83 FATIGUE, UNSPECIFIED TYPE: Primary | ICD-10-CM

## 2021-09-13 DIAGNOSIS — R79.89 ABNORMAL CBC: ICD-10-CM

## 2021-09-13 DIAGNOSIS — R53.83 FATIGUE, UNSPECIFIED TYPE: ICD-10-CM

## 2021-09-13 DIAGNOSIS — R77.1 ELEVATED SERUM GLOBULIN LEVEL: ICD-10-CM

## 2021-09-13 DIAGNOSIS — M35.00 SJOGREN'S SYNDROME, WITH UNSPECIFIED ORGAN INVOLVEMENT (HCC): ICD-10-CM

## 2021-09-13 DIAGNOSIS — I10 ESSENTIAL HYPERTENSION: ICD-10-CM

## 2021-09-13 PROCEDURE — 99214 OFFICE O/P EST MOD 30 MIN: CPT | Performed by: INTERNAL MEDICINE

## 2021-09-13 NOTE — PATIENT INSTRUCTIONS
Fatigue: Care Instructions  Your Care Instructions     Fatigue is a feeling of tiredness, exhaustion, or lack of energy. You may feel fatigue because of too much or not enough activity. It can also come from stress, lack of sleep, boredom, and poor diet. Many medical problems, such as viral infections, can cause fatigue. Emotional problems, especially depression, are often the cause of fatigue. Fatigue is most often a symptom of another problem. Treatment for fatigue depends on the cause. For example, if you have fatigue because you have a certain health problem, treating this problem also treats your fatigue. If depression or anxiety is the cause, treatment may help. Follow-up care is a key part of your treatment and safety. Be sure to make and go to all appointments, and call your doctor if you are having problems. It's also a good idea to know your test results and keep a list of the medicines you take. How can you care for yourself at home? · Get regular exercise. But don't overdo it. Go back and forth between rest and exercise. · Get plenty of rest.  · Eat a healthy diet. Do not skip meals, especially breakfast.  · Reduce your use of caffeine, tobacco, and alcohol. Caffeine is most often found in coffee, tea, cola drinks, and chocolate. · Limit medicines that can cause fatigue. This includes tranquilizers and cold and allergy medicines. When should you call for help? Watch closely for changes in your health, and be sure to contact your doctor if:    · You have new symptoms such as fever or a rash.     · Your fatigue gets worse.     · You have been feeling down, depressed, or hopeless. Or you may have lost interest in things that you usually enjoy.     · You are not getting better as expected. Where can you learn more? Go to http://www.gray.com/  Enter [id number] in the search box to learn more about \"Fatigue: Care Instructions. \"  Current as of: February 26, 2020               Content Version: 12.8  © 9392-0935 [employer]. Care instructions adapted under license by [employer] (which disclaims liability or warranty for this information). If you have questions about a medical condition or this instruction, always ask your healthcare professional. [address] any warranty or liability for your use of this information.

## 2021-09-13 NOTE — PROGRESS NOTES
[facility] OF [facility]:  9/13/2021, MRN: [id number]      Raghav Sparks is a 46 y.o. female and presents to clinic for Follow-up and Fatigue (Patient reports feeling tired for a few months. )      Subjective: The pt is a 47yo female with obesity, anemia, HTN, depression, positive Sjogren's serology, and sarcoidosis (diagnosed at 24yo, followed by [facility]). 1. Fatigue: Present for several months. The last time she felt like this, she was told her vitamin D level was low. She takes 2000 IUs of vitamin D OTC daily. No bleeding. No SOB. 2. Dysphagia: Her sx resolved. 4/15/21 Barium Swallow: Mild esophageal dysmotility. 3. Lower Back Pain: Triggers: Prolonged standing. Pain is along her lower back. No paresthesias/weaknesses. 4. Positive Sjogren's Lab and H/o Sarcoidosis: No jt pain. +Fatigue as mentioned in #1. No SOB. +Dry mouth. She was referred to a rheumatologist in the past.  No medication was warranted at that time. 5. HTN: Her BP is stable today. Taking norvasc and losartan. She has a history of an elevated blood glucose noted on a previous BMP. Her weight today is 260 pounds. She is trying to lose weight. Her BMI is 41. Patient Active Problem List    Diagnosis Date Noted    Sjogren's syndrome ([facility] [address] 06/04/2020    Severe obesity (BMI 35.0-39. 9) with comorbidity ([facility] 04/12/2018    Essential hypertension 06/07/2016    Sarcoidosis 06/07/2016       Current Outpatient Medications   Medication Sig Dispense Refill    losartan (COZAAR) 25 mg tablet TAKE 1 TABLET BY MOUTH EVERY DAY 90 Tablet 1    amLODIPine (NORVASC) 5 mg tablet Take 1 Tab by mouth daily. 90 Tab 2    cholecalciferol, vitamin D3, (VITAMIN D3) 2,000 unit tab Take 2,000 Units by mouth daily.  iron-vitamin C 65 mg iron- 125 mg TbEC Take  mg by mouth daily.  MULTIVITAMIN WITH MINERALS (ONE DAILY COMPLETE PO) Take  by mouth daily.       omeprazole (PRILOSEC) 20 mg capsule TAKE 1 CAPSULE BY MOUTH EVERY DAY (Patient not taking: Reported on 2021) 30 Cap 5    biotin 2,500 mcg Tab Take 2,500 mcg by mouth daily. (Patient not taking: Reported on 2021)         Allergies   Allergen Reactions    Hydrochlorothiazide Other (comments)     Hair loss    Oxycodone Itching and Other (comments)    Percocet [Oxycodone-Acetaminophen] Itching       Past Medical History:   Diagnosis Date    Anemia     Asthma     childhood asthma    Hypertension     Menopause     Menorrhalgia 2015    Sarcoidosis     no problems managed by PCP    Sjogren's syndrome ([facility] [address] 2020       Past Surgical History:   Procedure Laterality Date    HX ABDOMINOPLASTY      HX  SECTION      x 4    HX HERNIA REPAIR      umbilical    HX LIPOMA RESECTION      lower back    HX TUBAL LIGATION      HX WISDOM TEETH EXTRACTION      x2    ID MYOMECTOMY 1-4,W/TOT 250GMS/<,ABD APPRCH         Family History   Problem Relation Age of Onset    Hypertension Mother     Diabetes Father     No Known Problems Maternal Grandmother     Alcohol abuse Maternal Grandfather     Cancer Paternal Grandmother         breast    Alcohol abuse Paternal Grandfather     No Known Problems Daughter     No Known Problems Son        Social History     Tobacco Use    Smoking status: Current Some Day Smoker     Packs/day: 0.25     Years: 30.00     Pack years: 7.50     Types: Cigarettes     Start date: 2018    Smokeless tobacco: Never Used   Substance Use Topics    Alcohol use: Yes     Alcohol/week: 0.0 - 7.0 standard drinks     Comment: patient will drink either wine or liquour within the week        ROS   Review of Systems   Constitutional: Positive for malaise/fatigue. Negative for chills and fever. HENT: Negative for ear pain and sore throat. Eyes: Negative for blurred vision and pain. Respiratory: Negative for cough and shortness of breath. Cardiovascular: Negative for chest pain.    Gastrointestinal: Negative for abdominal pain, blood in stool and melena. Genitourinary: Negative for dysuria and hematuria. Musculoskeletal: Negative for joint pain and myalgias. Skin: Negative for rash. Neurological: Negative for tingling, focal weakness and headaches. Endo/Heme/Allergies: Does not bruise/bleed easily. Psychiatric/Behavioral: Negative for substance abuse. Objective     Vitals:    09/13/21 0857   BP: 125/86   Pulse: 70   Resp: 18   Temp: 97.3 °F (36.3 °C)   TempSrc: Temporal   SpO2: 96%   Weight: 260 lb (117.9 kg)   Height: 5' 6\" (1.676 m)   PainSc:   0 - No pain       Physical Exam  Vitals and nursing note reviewed. HENT:      Head: Normocephalic and atraumatic. Right Ear: External ear normal.      Left Ear: External ear normal.   Eyes:      General: No scleral icterus. Right eye: No discharge. Left eye: No discharge. Conjunctiva/sclera: Conjunctivae normal.   Cardiovascular:      Rate and Rhythm: Normal rate and regular rhythm. Heart sounds: Normal heart sounds. No murmur heard. No friction rub. No gallop. Pulmonary:      Effort: Pulmonary effort is normal. No respiratory distress. Breath sounds: Normal breath sounds. No wheezing or rales. Abdominal:      General: Bowel sounds are normal. There is no distension. Palpations: Abdomen is soft. There is no mass. Tenderness: There is no abdominal tenderness. There is no guarding or rebound. Musculoskeletal:         General: No swelling (BUE/BLE) or tenderness (BUE). Cervical back: Neck supple. Lymphadenopathy:      Cervical: No cervical adenopathy. Skin:     General: Skin is warm and dry. Findings: No erythema. Neurological:      Mental Status: She is alert and oriented to person, place, and time. Motor: No abnormal muscle tone. Gait: Gait is intact.  Gait normal.   Psychiatric:         Mood and Affect: Mood and affect normal.         LABS   Data Review:   Lab Results   Component Value Date/Time    WBC 7.3 03/31/2021 10:24 AM    HGB 12.0 03/31/2021 10:24 AM    HCT 37.9 03/31/2021 10:24 AM    PLATELET 699 (H) 54/16/6948 10:24 AM    MCV 77.3 03/31/2021 10:24 AM       Lab Results   Component Value Date/Time    Sodium 141 03/24/2021 09:22 AM    Potassium 4.1 03/24/2021 09:22 AM    Chloride 105 03/24/2021 09:22 AM    CO2 30 03/24/2021 09:22 AM    Anion gap 6 03/24/2021 09:22 AM    Glucose 84 03/24/2021 09:22 AM    BUN 8 03/24/2021 09:22 AM    Creatinine 0.73 03/24/2021 09:22 AM    BUN/Creatinine ratio 11 (L) 03/24/2021 09:22 AM    GFR est AA >60 03/24/2021 09:22 AM    GFR est non-AA >60 03/24/2021 09:22 AM    Calcium 8.8 03/24/2021 09:22 AM       Lab Results   Component Value Date/Time    Cholesterol, total 179 03/24/2021 09:22 AM    HDL Cholesterol 75 (H) 03/24/2021 09:22 AM    LDL cholesterol 82 06/24/2011 10:16 AM    LDL, calculated 95.6 03/24/2021 09:22 AM    VLDL, calculated 8.4 03/24/2021 09:22 AM    Triglyceride 42 03/24/2021 09:22 AM    CHOL/HDL Ratio 2.4 03/24/2021 09:22 AM       Lab Results   Component Value Date/Time    Hemoglobin A1c 5.4 03/24/2021 09:22 AM       Assessment/Plan:   1. Fatigue: +H/o Sarcoidosis, elevated globulin, elevated CRP, and positive Sjogren's lab (now having dry mouth sx). +H/o elevated platelets seen on serial CBCs. Checking a chest x-ray. We will check labs as well today  I will follow with her in 2 weeks to discuss her results. I will also check labs in 6 months. ORDERS:  - XR CHEST PA LAT; Future  - CBC WITH AUTOMATED DIFF; Future  - METABOLIC PANEL, COMPREHENSIVE; Future  - IMMUNOELECTROPHORESIS [facility].); Future  - PROTEIN ELECTROPHORESIS; Future  - ANGIOTENSIN CONVERTING ENZYME; Future  - DIAZ COMPREHENSIVE PANEL; Future  - C REACTIVE PROTEIN, QT; Future  - RHEUMASSURE; Future    2. Essential hypertension: Stable. Checking labs in 6 months. Continue with medication as prescribed. 3. Dysphagia: Resolved. Observation.     4.  Lower Back Pain: Likely from strain. Observation. Health Maintenance Due   Topic Date Due    Shingrix Vaccine Age 49> (1 of 2) Never done    Cervical cancer screen  10/30/2020    Flu Vaccine (1) 09/01/2021     Lab review: labs are reviewed in the EHR and ordered as mentioned above. I have discussed the diagnosis with the patient and the intended plan as seen in the above orders. The patient has received an after-visit summary and questions were answered concerning future plans. I have discussed medication side effects and warnings with the patient as well. I have reviewed the plan of care with the patient, accepted their input and they are in agreement with the treatment goals. All questions were answered. The patient understands the plan of care. Handouts provided today with above information. Pt instructed if symptoms worsen to call the office or report to the ED for continued care. Greater than 50% of the visit time was spent in counseling and/or coordination of care. Voice recognition was used to generate this report, which may have resulted in some phonetic based errors in grammar and contents. Even though attempts were made to correct all the mistakes, some may have been missed, and remained in the body of the document.           Mabel Burgos MD

## 2021-09-17 ENCOUNTER — HOSPITAL ENCOUNTER (OUTPATIENT)
Dept: LAB | Age: 51
Discharge: HOME | End: 2021-09-17
Payer: COMMERCIAL

## 2021-09-17 ENCOUNTER — HOSPITAL ENCOUNTER (OUTPATIENT)
Dept: GENERAL RADIOLOGY | Age: 51
Discharge: HOME | End: 2021-09-17
Payer: COMMERCIAL

## 2021-09-17 DIAGNOSIS — D86.9 SARCOIDOSIS: ICD-10-CM

## 2021-09-17 DIAGNOSIS — R79.89 ABNORMAL CBC: ICD-10-CM

## 2021-09-17 DIAGNOSIS — I10 ESSENTIAL HYPERTENSION: ICD-10-CM

## 2021-09-17 DIAGNOSIS — R77.1 ELEVATED SERUM GLOBULIN LEVEL: ICD-10-CM

## 2021-09-17 DIAGNOSIS — M35.00 SJOGREN'S SYNDROME, WITH UNSPECIFIED ORGAN INVOLVEMENT (HCC): ICD-10-CM

## 2021-09-17 LAB
ALBUMIN SERPL-MCNC: 3.2 G/DL (ref 3.4–5)
ALBUMIN/GLOB SERPL: 0.8 {RATIO} (ref 0.8–1.7)
ALP SERPL-CCNC: 86 U/L (ref 45–117)
ALT SERPL-CCNC: 17 U/L (ref 13–56)
ANION GAP SERPL CALC-SCNC: 1 MMOL/L (ref 3–18)
AST SERPL-CCNC: 12 U/L (ref 10–38)
BASOPHILS # BLD: 0 K/UL (ref 0–0.1)
BASOPHILS NFR BLD: 0 % (ref 0–2)
BILIRUB SERPL-MCNC: 0.4 MG/DL (ref 0.2–1)
BUN SERPL-MCNC: 6 MG/DL (ref 7–18)
BUN/CREAT SERPL: 8 (ref 12–20)
CALCIUM SERPL-MCNC: 9 MG/DL (ref 8.5–10.1)
CHLORIDE SERPL-SCNC: 108 MMOL/L (ref 100–111)
CO2 SERPL-SCNC: 31 MMOL/L (ref 21–32)
CREAT SERPL-MCNC: 0.75 MG/DL (ref 0.6–1.3)
CRP SERPL-MCNC: 2.1 MG/DL (ref 0–0.3)
DIFFERENTIAL METHOD BLD: ABNORMAL
EOSINOPHIL # BLD: 0.1 K/UL (ref 0–0.4)
EOSINOPHIL NFR BLD: 2 % (ref 0–5)
ERYTHROCYTE [DISTWIDTH] IN BLOOD BY AUTOMATED COUNT: 16.2 % (ref 11.6–14.5)
GLOBULIN SER CALC-MCNC: 4.2 G/DL (ref 2–4)
GLUCOSE SERPL-MCNC: 71 MG/DL (ref 74–99)
HCT VFR BLD AUTO: 38.4 % (ref 35–45)
HGB BLD-MCNC: 11.8 G/DL (ref 12–16)
LYMPHOCYTES # BLD: 1.8 K/UL (ref 0.9–3.6)
LYMPHOCYTES NFR BLD: 25 % (ref 21–52)
MCH RBC QN AUTO: 23.8 PG (ref 24–34)
MCHC RBC AUTO-ENTMCNC: 30.7 G/DL (ref 31–37)
MCV RBC AUTO: 77.6 FL (ref 78–100)
MONOCYTES # BLD: 0.6 K/UL (ref 0.05–1.2)
MONOCYTES NFR BLD: 8 % (ref 3–10)
NEUTS SEG # BLD: 4.7 K/UL (ref 1.8–8)
NEUTS SEG NFR BLD: 65 % (ref 40–73)
PLATELET # BLD AUTO: 440 K/UL (ref 135–420)
PMV BLD AUTO: 9.5 FL (ref 9.2–11.8)
POTASSIUM SERPL-SCNC: 4 MMOL/L (ref 3.5–5.5)
PROT SERPL-MCNC: 7.4 G/DL (ref 6.4–8.2)
RBC # BLD AUTO: 4.95 M/UL (ref 4.2–5.3)
SODIUM SERPL-SCNC: 140 MMOL/L (ref 136–145)
WBC # BLD AUTO: 7.3 K/UL (ref 4.6–13.2)

## 2021-09-17 PROCEDURE — 86200 CCP ANTIBODY: CPT

## 2021-09-17 PROCEDURE — 84165 PROTEIN E-PHORESIS SERUM: CPT

## 2021-09-17 PROCEDURE — 80053 COMPREHEN METABOLIC PANEL: CPT

## 2021-09-17 PROCEDURE — 86235 NUCLEAR ANTIGEN ANTIBODY: CPT

## 2021-09-17 PROCEDURE — 86140 C-REACTIVE PROTEIN: CPT

## 2021-09-17 PROCEDURE — 82784 ASSAY IGA/IGD/IGG/IGM EACH: CPT

## 2021-09-17 PROCEDURE — 71046 X-RAY EXAM CHEST 2 VIEWS: CPT

## 2021-09-17 PROCEDURE — 82164 ANGIOTENSIN I ENZYME TEST: CPT

## 2021-09-17 PROCEDURE — 85025 COMPLETE CBC W/AUTO DIFF WBC: CPT

## 2021-09-17 PROCEDURE — 36415 COLL VENOUS BLD VENIPUNCTURE: CPT

## 2021-09-19 LAB — ACE SERPL-CCNC: 45 U/L (ref 14–82)

## 2021-09-20 LAB
ALBUMIN SERPL ELPH-MCNC: 3.3 G/DL (ref 2.9–4.4)
ALBUMIN/GLOB SERPL: 0.8 {RATIO} (ref 0.7–1.7)
ALPHA1 GLOB SERPL ELPH-MCNC: 0.3 G/DL (ref 0–0.4)
ALPHA2 GLOB SERPL ELPH-MCNC: 1 G/DL (ref 0.4–1)
B-GLOBULIN SERPL ELPH-MCNC: 1.1 G/DL (ref 0.7–1.3)
GAMMA GLOB SERPL ELPH-MCNC: 1.7 G/DL (ref 0.4–1.8)
GLOBULIN SER CALC-MCNC: 4.1 G/DL (ref 2.2–3.9)
M PROTEIN SERPL ELPH-MCNC: ABNORMAL G/DL
PROT SERPL-MCNC: 7.4 G/DL (ref 6–8.5)

## 2021-09-21 LAB
IGA SERPL-MCNC: 225 MG/DL (ref 87–352)
IGG SERPL-MCNC: 1371 MG/DL (ref 586–1602)
IGM SERPL-MCNC: 483 MG/DL (ref 26–217)
PROT PATTERN SERPL IFE-IMP: ABNORMAL

## 2021-09-26 NOTE — PROGRESS NOTES
I will let her know at her upcoming apt that her globulin level is high. There is no M spike per her labs. Her immunofixation labs show \"polyclonal increase in one or more immunoglobulins. \"  Her ACE level is WNL. Her DIAZ results are unchanged with her SS-A lab being at her baseline elevated result (7.4). Kidney/liver function labs are WNL. Her CRP is elevated at 2.1, from 1.8 five months ago. Please fax all of these results to her rheumatologist. Her CBC shows a hemoglobin of 11.8 (mildly low). Her platelet count is still mildly elevated at 440 (down from 512).      Dr. Janice Torres  Internists of [facility], [address] [place] [address], [address] [place] [address] Str.  Phone: [phone]  Fax: [phone]

## 2021-09-26 NOTE — PROGRESS NOTES
I will discuss her CXR results with her at her upcoming apt. \"Volume loss with parenchymal scarring is seen involving the right upper lobe. There is a focus of linear atelectasis or scarring in the left lung base. I  suspect that these findings are likely chronic. Comparison with prior chest  radiographs would be extremely useful to confirm stability. CT could also be  Considered. \"    Dr. Clifton Members  Internists of [address] [place] [address], [facility] [address]  Phone: [phone]  Fax: [phone]

## 2021-09-28 ENCOUNTER — VIRTUAL VISIT (OUTPATIENT)
Dept: INTERNAL MEDICINE CLINIC | Age: 51
End: 2021-09-28

## 2021-09-28 DIAGNOSIS — R53.83 FATIGUE, UNSPECIFIED TYPE: Primary | ICD-10-CM

## 2021-09-28 NOTE — PROGRESS NOTES
Osmel Ballard is a 46 y.o. female who was seen by synchronous (real-time) audio-video technology on 9/28/2021. Assessment & Plan:   Fatigue: I do not have a diagnosis that would explain all of her symptoms. I discussed this today. She has an active diagnosis of Sjogren's disease. Her CRP is in the 2 range. Her globulin level is elevated. These are nonspecific findings that can be seen in autoimmune diseases. Her ACE level is normal which would not suggest that her sarcoidosis is active. There is no evidence as well of any type of underlying lung cancer per my evaluation. There is no evidence of thyroid disease, liver disease, kidney disease, or infection. I recommended that she get a sleep study with a Sleep Medicine doctor, see a hematologist to further evaluate her persistent slightly elevated platelet count, and that she also schedule a checkup with her rheumatologist.  She seemed very frustrated that I could not give her any diagnosis that would explain her symptoms. She stated that I ordered test after test and that no progress was being made. I've only seen her once this yr and at that apt, she reported sx of fatigue. Prior to that apt, her last visit with me was last summer. Today, she was very frustrated and disatisfied with the care that I have provided thus far. I offered to try a trial of vitamin D but stated that in my medical opinion, this was not the reason she was fatigued and that her level was likely normal given her normal calcium/creatinine. Additionally, we never addressed her sx of depression today, she mentioned at the beginning of the apt - due to her being dissatisfied with the care that she has received from me - and spending time addressing this concern. She thanked me for being candid when discussing everything we have ruled out. Per our discussion, it was mutually agreed upon that she transition to a different doctor for primary care needs.   My hope is that she will benefit from having another physician look/review all of her results and that this may lead to a diagnosis/treatment that will ultimately lead to alleviating her fatigue. I expressed my regret in not being able to provide the answer to what is causing her fatigue. It is not knowing that prevents me from adequately treating her fatigue. Meanwhile, I will forward my office note today and her recent results to her pulmonologist.  I asked her if she had someone in mind she may want to transition to as her new PCP - so that I can forward today's note to this provider along with her recent labs/studies. I expressed how I didn't want her new provider to have to repeat some of the same testing due to not having all of her records. She stated that she did not have anyone in mind as she just decided to seek care with another provider during our visit. She stated that when she establishes care, she will have her new PCP request records formally. She then hung up before I could say anything else. I spent about 30 minutes with the patient for this encounter. I will no charge this visit due to her dissatisfaction with ultimately the care that has been provided to her. I wish her well and will forward all of her records to her new PCP once she chooses one. No additional medical care will be provided after today per our discussion. Lab review: labs are reviewed in the EHR     I have discussed the diagnosis with the patient and the intended plan as seen in the above orders. I have discussed medication side effects and warnings with the patient as well. I have reviewed the plan of care with the patient, accepted their input and they are in agreement with the treatment goals. All questions were answered. The patient understands the plan of care. Pt instructed if symptoms worsen to call the office or report to the ED for continued care.   Greater than 50% of the visit time was spent in counseling and/or coordination of care.     Voice recognition was used to generate this report, which may have resulted in some phonetic based errors in grammar and contents. Even though attempts were made to correct all the mistakes, some may have been missed, and remained in the body of the document. Subjective:   Taylor Dee was seen for   Chief Complaint   Patient presents with    Follow-up     The pt is a 49yo female with obesity, anemia, HTN, depression, positive Sjogren's serology, and sarcoidosis (diagnosed at 22yo, followed by [facility]mon[facility]).     Fatigue: At her last apt, she reported worsening fatigue. She had labs since then that showed: her globulin level is high. There is no M spike per her labs. Her immunofixation labs show \"polyclonal increase in one or more immunoglobulins. \"  Her ACE level is WNL. Her DIAZ results are unchanged with her SS-A lab being at her baseline elevated result (7.4). Kidney/liver function labs are WNL. Her CRP is elevated at 2.1, from 1.8 five months ago. The patient is very frustrated today. She states that she has had fatigue for several months and she has no reason for the fatigue. There are some concern that she has in regards to her vitamin D level. Her most recent labs show that her creatinine and calcium are normal.  She states that symptoms are similar to when she had vitamin D deficiency in the past.  She denies shortness of breath and fever. In the past, I referred her to a rheumatologist for evaluation. She has not seen them recently. She has a history of dry mouth and eyes. Incidentally, she sees a pulmonologist given her history of sarcoidosis. With that in working up her fatigue, I ordered a chest x-ray. Findings are listed below. She states that she had a chest x-ray earlier this year with her pulmonologist.  I do not have records. She also reports some depression symptoms today. Also note that her TFTs were checked earlier this year and within normal limits.     CXR 21: Volume loss with parenchymal scarring is seen involving the right upper lobe. There is a focus of linear atelectasis or scarring in the left lung base. I suspect that these findings are likely chronic. Comparison with prior chest radiographs would be extremely useful to confirm stability. CT could also be Considered. Prior to Admission medications    Medication Sig Start Date End Date Taking? Authorizing Provider   losartan (COZAAR) 25 mg tablet TAKE 1 TABLET BY MOUTH EVERY DAY 21   Siobhan Klein MD   amLODIPine (NORVASC) 5 mg tablet Take 1 Tab by mouth daily. 21   Siobhan Klein MD   omeprazole (PRILOSEC) 20 mg capsule TAKE 1 CAPSULE BY MOUTH EVERY DAY  Patient not taking: Reported on 2021 5/3/21   Siobhan Klein MD   cholecalciferol, vitamin D3, (VITAMIN D3) 2,000 unit tab Take 2,000 Units by mouth daily. Provider, Historical   iron-vitamin C 65 mg iron- 125 mg TbEC Take  mg by mouth daily. Provider, Historical   biotin 2,500 mcg Tab Take 2,500 mcg by mouth daily. Patient not taking: Reported on 2021    Provider, Historical   MULTIVITAMIN WITH MINERALS (ONE DAILY COMPLETE PO) Take  by mouth daily.     Provider, Historical     Allergies   Allergen Reactions    Hydrochlorothiazide Other (comments)     Hair loss    Oxycodone Itching and Other (comments)    Percocet [Oxycodone-Acetaminophen] Itching     Past Medical History:   Diagnosis Date    Anemia     Asthma     childhood asthma    Hypertension     Menopause     Menorrhalgia 2015    Sarcoidosis     no problems managed by PCP    Sjogren's syndrome ([facility] [address] 2020     Past Surgical History:   Procedure Laterality Date    HX ABDOMINOPLASTY      HX  SECTION      x 4    HX HERNIA REPAIR      umbilical    HX LIPOMA RESECTION      lower back    HX TUBAL LIGATION      HX WISDOM TEETH EXTRACTION      x2    NH MYOMECTOMY 1-4,W/TOT 250GMS/<,ABD APPRCH       Family History   Problem Relation Age of Onset    Hypertension Mother     Diabetes Father     No Known Problems Maternal Grandmother     Alcohol abuse Maternal Grandfather     Cancer Paternal Grandmother         breast    Alcohol abuse Paternal Grandfather     No Known Problems Daughter     No Known Problems Son      Social History     Socioeconomic History    Marital status:      Spouse name: Not on file    Number of children: Not on file    Years of education: Not on file    Highest education level: Not on file   Tobacco Use    Smoking status: Current Some Day Smoker     Packs/day: 0.25     Years: 30.00     Pack years: 7.50     Types: Cigarettes     Start date: 9/27/2018    Smokeless tobacco: Never Used   Substance and Sexual Activity    Alcohol use: Yes     Alcohol/week: 0.0 - 7.0 standard drinks     Comment: patient will drink either wine or liquour within the week     Drug use: Yes     Types: Prescription, OTC   Social History Narrative    Not currently working     Social Determinants of Health     Financial Resource Strain:     Difficulty of Paying Living Expenses:    Food Insecurity:     Worried About Running Out of Food in the Last Year:     [address] in the Last Year:    Transportation Needs:     Lack of Transportation (Medical):  Lack of Transportation (Non-Medical):    Physical Activity:     Days of Exercise per Week:     Minutes of Exercise per Session:    Stress:     Feeling of Stress :    Social Connections:     Frequency of Communication with Friends and Family:     Frequency of Social Gatherings with Friends and Family:     Attends Sabianism Services:     Active Member of Clubs or Organizations:     Attends Club or Organization Meetings:     Marital Status:        ROS:  Gen: No fever/chills  HEENT: No sore throat, eye pain, ear pain, or congestion.  No HA  CV: No CP  Resp: No cough/SOB  GI: No abdominal pain  : No hematuria/dysuria  Derm: No rash  Neuro: No new paresthesias/weakness  Musc: No new myalgias/jt pain  Psych: No depression sx      Objective:     General: alert, cooperative, no distress   Mental  status: mental status: alert, normal behavior, speech, dress, motor activity, and thought processes   Resp: resp: normal effort and no respiratory distress   Neuro: neuro: no gross deficits   Skin: skin: no discoloration or lesions of concern on visible areas     LABS:  Lab Results   Component Value Date/Time    Sodium 140 09/17/2021 01:03 PM    Potassium 4.0 09/17/2021 01:03 PM    Chloride 108 09/17/2021 01:03 PM    CO2 31 09/17/2021 01:03 PM    Anion gap 1 (L) 09/17/2021 01:03 PM    Glucose 71 (L) 09/17/2021 01:03 PM    BUN 6 (L) 09/17/2021 01:03 PM    Creatinine 0.75 09/17/2021 01:03 PM    BUN/Creatinine ratio 8 (L) 09/17/2021 01:03 PM    GFR est AA >60 09/17/2021 01:03 PM    GFR est non-AA >60 09/17/2021 01:03 PM    Calcium 9.0 09/17/2021 01:03 PM       Lab Results   Component Value Date/Time    Cholesterol, total 179 03/24/2021 09:22 AM    HDL Cholesterol 75 (H) 03/24/2021 09:22 AM    LDL cholesterol 82 06/24/2011 10:16 AM    LDL, calculated 95.6 03/24/2021 09:22 AM    VLDL, calculated 8.4 03/24/2021 09:22 AM    Triglyceride 42 03/24/2021 09:22 AM    CHOL/HDL Ratio 2.4 03/24/2021 09:22 AM       Lab Results   Component Value Date/Time    WBC 7.3 09/17/2021 01:03 PM    HGB 11.8 (L) 09/17/2021 01:03 PM    HCT 38.4 09/17/2021 01:03 PM    PLATELET 898 (H) 98/84/1234 01:03 PM    MCV 77.6 (L) 09/17/2021 01:03 PM       Lab Results   Component Value Date/Time    Hemoglobin A1c 5.4 03/24/2021 09:22 AM       Lab Results   Component Value Date/Time    TSH 0.79 03/24/2021 09:22 AM    TSH 0.64 07/22/2016 10:25 AM           Due to this being a TeleHealth  evaluation, many elements of the physical examination are unable to be assessed.      The pt was seen by synchronous (real-time) audio-video technology, and/or her healthcare decision maker, is aware that this patient-initiated, Telehealth encounter is a billable service, with coverage as determined by her insurance carrier. She is aware that she may receive a bill and has provided verbal consent to proceed: Yes. The pt is being evaluated by a video visit encounter for concerns as above. A caregiver was present when appropriate. Due to this being a TeleHealth encounter (During PTSMT-74 public health emergency), evaluation of the following organ systems was limited: Vitals/Constitutional/EENT/Resp/CV/GI//MS/Neuro/Skin/Heme-Lymph-Imm. Pursuant to the emergency declaration under the [address] [place] [address], [facility] waiver authority and the [employer] and [employer] Act, this Virtual  Visit was conducted, with patient's (and/or legal guardian's) consent, to reduce the patient's risk of exposure to COVID-19 and provide necessary medical care. Services were provided through a video synchronous discussion virtually to substitute for in-person clinic visit. Patient and provider were located at their individual homes. We discussed the expected course, resolution and complications of the diagnosis(es) in detail. Medication risks, benefits, costs, interactions, and alternatives were discussed as indicated. I advised her to contact the office if her condition worsens, changes or fails to improve as anticipated. She expressed understanding with the diagnosis(es) and plan.      Mabel Burgos MD

## 2022-03-19 PROBLEM — E66.01 SEVERE OBESITY (BMI 35.0-39.9) WITH COMORBIDITY (HCC): Status: ACTIVE | Noted: 2018-04-12

## 2022-03-20 PROBLEM — M35.00 SJOGREN'S SYNDROME (HCC): Status: ACTIVE | Noted: 2020-06-04

## 2022-10-24 PROBLEM — I48.91 NEW ONSET ATRIAL FIBRILLATION (HCC): Status: ACTIVE | Noted: 2022-10-24

## 2022-10-24 PROBLEM — K21.9 GASTROESOPHAGEAL REFLUX DISEASE WITHOUT ESOPHAGITIS: Status: ACTIVE | Noted: 2022-03-11

## 2022-10-24 PROBLEM — I48.20 CHRONIC ATRIAL FIBRILLATION (HCC): Status: ACTIVE | Noted: 2022-10-24

## 2022-10-24 PROBLEM — N95.0 POSTMENOPAUSAL BLEEDING: Status: ACTIVE | Noted: 2022-10-24

## 2022-10-24 PROBLEM — R79.89 ABNORMAL LIVER FUNCTION TESTS: Status: ACTIVE | Noted: 2022-09-29

## 2022-10-24 PROBLEM — E66.01 SEVERE OBESITY (HCC): Status: ACTIVE | Noted: 2022-10-24

## 2024-05-20 NOTE — TELEPHONE ENCOUNTER
Last Visit: 05/10/2018 with MD Amanda Contreras    Next Appointment: 11/12/2018 with MD Amanda Contreras   Previous Refill Encounters: 04/12/2018 per MD Amanda Contreras #[id number] with 1 refill    Requested Prescriptions     Pending Prescriptions Disp Refills    amLODIPine (NORVASC) 5 mg tablet 30 Tab 1     Sig: Take 1 Tab by mouth daily.
show

## 2024-06-05 ENCOUNTER — HOSPITAL ENCOUNTER (OUTPATIENT)
Facility: HOSPITAL | Age: 54
Setting detail: SPECIMEN
Discharge: HOME | End: 2024-06-08
Payer: COMMERCIAL

## 2024-06-05 PROCEDURE — 88175 CYTOPATH C/V AUTO FLUID REDO: CPT

## 2024-06-05 PROCEDURE — 87624 HPV HI-RISK TYP POOLED RSLT: CPT
